# Patient Record
Sex: FEMALE | Race: WHITE | NOT HISPANIC OR LATINO | Employment: FULL TIME | URBAN - METROPOLITAN AREA
[De-identification: names, ages, dates, MRNs, and addresses within clinical notes are randomized per-mention and may not be internally consistent; named-entity substitution may affect disease eponyms.]

---

## 2017-01-03 ENCOUNTER — TELEPHONE (OUTPATIENT)
Dept: CARDIOLOGY | Facility: CLINIC | Age: 52
End: 2017-01-03

## 2017-01-03 NOTE — TELEPHONE ENCOUNTER
Pt called she needs clearance for an upcoming Total Knee Arthroscopy.   Dr Dexter Araya at [facility] # [phone].   Please advise   Pt's call back # [phone]   Thanks Elmo GILL

## 2017-01-03 NOTE — TELEPHONE ENCOUNTER
I spoke with pt and let her know she is cleared for her surgery.   I will fax a letter to Dr Araya's office. I will send copy of clearance letter and fax confirmation to be scanned into pt's chart.  Elmo GILL

## 2017-01-03 NOTE — TELEPHONE ENCOUNTER
Elmo,     Can you please send a clearance letter?  She is clear at low-to-moderate risk, and I would proceed.  Thanks.    GM

## 2017-01-19 RX ORDER — AMOXICILLIN 500 MG/1
2000 CAPSULE ORAL SEE ADMIN INSTRUCTIONS
Qty: 4 CAPSULE | Refills: 6 | Status: SHIPPED | OUTPATIENT
Start: 2017-01-19 | End: 2017-05-05

## 2017-04-14 ENCOUNTER — LAB (OUTPATIENT)
Dept: LAB | Facility: HOSPITAL | Age: 52
End: 2017-04-14

## 2017-04-14 ENCOUNTER — OFFICE VISIT (OUTPATIENT)
Dept: CARDIOLOGY | Facility: CLINIC | Age: 52
End: 2017-04-14

## 2017-04-14 ENCOUNTER — CLINICAL SUPPORT NO REQUIREMENTS (OUTPATIENT)
Dept: CARDIOLOGY | Facility: CLINIC | Age: 52
End: 2017-04-14

## 2017-04-14 VITALS
WEIGHT: 198 LBS | HEART RATE: 86 BPM | SYSTOLIC BLOOD PRESSURE: 110 MMHG | HEIGHT: 66 IN | DIASTOLIC BLOOD PRESSURE: 78 MMHG | OXYGEN SATURATION: 97 % | BODY MASS INDEX: 31.82 KG/M2

## 2017-04-14 DIAGNOSIS — Z95.0 PACEMAKER: ICD-10-CM

## 2017-04-14 DIAGNOSIS — I50.9 CONGESTIVE HEART FAILURE, UNSPECIFIED CONGESTIVE HEART FAILURE CHRONICITY, UNSPECIFIED CONGESTIVE HEART FAILURE TYPE: Primary | ICD-10-CM

## 2017-04-14 DIAGNOSIS — R30.0 DYSURIA: ICD-10-CM

## 2017-04-14 DIAGNOSIS — I42.8 NONISCHEMIC CARDIOMYOPATHY (HCC): Primary | ICD-10-CM

## 2017-04-14 DIAGNOSIS — Z87.74 STATUS POST PATCH CLOSURE OF ASD: ICD-10-CM

## 2017-04-14 LAB
BACTERIA UR QL AUTO: ABNORMAL /HPF
BILIRUB UR QL STRIP: NEGATIVE
CLARITY UR: ABNORMAL
COLOR UR: YELLOW
GLUCOSE UR STRIP-MCNC: NEGATIVE MG/DL
HGB UR QL STRIP.AUTO: NEGATIVE
HYALINE CASTS UR QL AUTO: ABNORMAL /LPF
KETONES UR QL STRIP: NEGATIVE
LEUKOCYTE ESTERASE UR QL STRIP.AUTO: ABNORMAL
NITRITE UR QL STRIP: NEGATIVE
PH UR STRIP.AUTO: 7 [PH] (ref 5–8)
PROT UR QL STRIP: NEGATIVE
RBC # UR: ABNORMAL /HPF
REF LAB TEST METHOD: ABNORMAL
SP GR UR STRIP: 1.02 (ref 1–1.03)
SQUAMOUS #/AREA URNS HPF: ABNORMAL /HPF
UROBILINOGEN UR QL STRIP: ABNORMAL
WBC UR QL AUTO: ABNORMAL /HPF

## 2017-04-14 PROCEDURE — 93281 PM DEVICE PROGR EVAL MULTI: CPT | Performed by: INTERNAL MEDICINE

## 2017-04-14 PROCEDURE — 93290 INTERROG DEV EVAL ICPMS IP: CPT | Performed by: INTERNAL MEDICINE

## 2017-04-14 PROCEDURE — 87186 SC STD MICRODIL/AGAR DIL: CPT

## 2017-04-14 PROCEDURE — 87181 SC STD AGAR DILUTION PER AGT: CPT

## 2017-04-14 PROCEDURE — 87086 URINE CULTURE/COLONY COUNT: CPT

## 2017-04-14 PROCEDURE — 99213 OFFICE O/P EST LOW 20 MIN: CPT | Performed by: INTERNAL MEDICINE

## 2017-04-14 PROCEDURE — 81001 URINALYSIS AUTO W/SCOPE: CPT

## 2017-04-14 NOTE — PROGRESS NOTES
Date of Office Visit: 2017  Encounter Provider: Lion Marques MD  Place of Service: [facility] CARDIOLOGY  Patient Name: Whitney Schofield  :1965    Chief complaint: Follow-up for open ASD closure, pacemaker, and non-ischemic   cardiomyopathy.    History of Present Illness:    Dear Dr. Meier:       I again had the pleasure of seeing your patient in cardiology office on 2017. As  you well know, she is a very pleasant 51 year old white female with a past medical history  significant for multiple cardiovascular issues who presents for follow-up. The patient had  originally presented to [facility] with symptoms of palpitations and  shortness of breath in 2009. She subsequently was diagnosed with Hashimoto  thyroiditis, and underwent a partial thyroidectomy in 2010. As part of a routine  follow-up, she had a two-dimensional echocardiogram performed on 2010 which   revealed a severely dilated right ventricle and significant pulmonary hypertension. A CT   scan of her chest did not show any evidence for pulmonary embolus or other acute lung   pathology.  She underwent a transesophageal echocardiogram on 2010 which   revealed a large secundum ASD high in the atrial septum. 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 She was referred to   Dr. Harper for evaluation to surgically repair the ASD, and underwent a preoperative   cardiac catheterization on 2010. This revealed angiographically normal coronary   arteries, with a Qp:Qs of 2.3, consistent with a large shunt. The patient underwent   closure of the ASD, as well as tricuspid valve repair with an annuloplasty ring by Dr. Harper at [facility] on 2010. Her postoperative period was complicated by   complete AV block and accelerated junctional escape rhythm. She underwent   placement of a permanent pacemaker on  "05/11/2010.      The patient continued to have significant shortness of breath and a 24-hour Holter   monitor and two-dimensional echocardiogram were obtained on 05/12/2011. Her   Holter monitor did show several brief episodes of tachycardia. The echocardiogram   did not show any significant findings other than a \"shuttering motion\" of the septum.   She underwent a right and left heart catheterization on 07/20/2011 to assess for any   evidence of constriction. There was no evidence of restriction or constriction on the   heart catheterization, although she did have pulmonary hypertension with a mean   pulmonary artery pressure of 30 mmHg. A transesophageal echocardiogram was also   performed on 06/20/2011 which revealed an ejection fraction of 55-60%, and an intact   ASD repair. An electrophysiology study was performed by Dr. Foster on 08/01/2011.   This was performed through her pacemaker in conjunction with an isoproterenol   infusion. This showed no inducible tachycardia or ventricular tachycardia.       The patient eventually wore a 21-day event recorder and was noted to go into   wide-complex tachycardia on 09/30/2012. She had multiple other episodes as well.   Dr. Aguilar from Electrophysiology saw the patient and felt this represented ventricular   tachycardia. She did ultimately undergo a repeat electrophysiology study on   11/20/2012 by Dr. Aguilar which confirmed complete infra-hisian block with no   conduction retrograde or antegrade. No ventricular tachycardia was induced. She   was started on beta blockers at that time.       At the patient's visit in 03/2013 she complained of severe volume retention and edema.    She was also more short of breath and had gained a significant amount of weight.   She was admitted to the hospital on 03/25/2013 and underwent a transesophageal   echocardiogram.  Her ejection fraction had decreased significantly to 35%, and there   was an extensive amount of thrombus formation on " her pacemaker wires. Her ASD   repair appeared to be in good condition. She did undergo a CT scan of the chest   which showed no evidence for pulmonary embolism, as well as a CT scan of the   abdomen which showed no evidence for inferior vena cava obstruction or clotting.   She was diuresed extensively and placed on Coumadin at that time. 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 She ultimately underwent left   ventricular lead placement and pacemaker replacement on 08/19/2013 by Dr. Aguilar.   She then had right atrial lead dislodgement and diaphragmatic stimulation. A right   atrial lead revision was performed on 02/19/2014; however, the patient developed a   significant left subclavian vein thrombosis post procedurally. She again was placed   on Coumadin at that time, which was then switched to Xarelto. She ultimately   underwent laser lead extraction of the abandoned right atrial lead on 04/17/2014. She   did undergo a right heart cath on 04/13/2015 which showed only mild pulmonary   hypertension (mean PA pressure 29 mm Hg).      The patient presents today for follow-up.  She did have her left knee arthroplasty on   1/23/2017.  She has largely recovered from this, and actually has some better   ambulation now.  Her edema has been intermittent, and this is normal for her.  She   has not had any increasing edema.  She was diagnosed with celiac disease recently,   unfortunately.  Her shortness of breath is at baseline.  She did have her pacemaker   interrogated, and this is functioning normally.    Past Medical History:   Diagnosis Date   • Asthma    • Asystole     Post ASD closure asystole and high-grade AV block.  s/p pacemaker 5/11/10.   • Celiac disease    • DVT (deep venous thrombosis)     Left subclavian DVT following right atrial pacemaker lead revision in 2/14   • Edema    • Hashimoto's thyroiditis     s/p partial thyroidectomy 1/13/10   • Malignant neoplasm of thyroid gland      Papillary carcinoma of the thyroid - s/p residual thyroidectomy in 2/11.   • Migraine    • Nonischemic cardiomyopathy     EF as low as 35% 3/13.  EF 6/21/13 by CHANDANA was 50-55%, and 53% by echo on 6/17/14.  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  EF 50% by CHANDANA on 4/18/16.   • NSVT (nonsustained ventricular tachycardia)     Noted by event recorder 9/12.  EP study by Dr. Aguilar on 11/20/12 showed no inducible VT.   • LUKAS on CPAP    • Rheumatoid arthritis    • Secundum ASD     s/p open closure with a PeriPatch xenograft by Dr. Harper on 5/6/10 by Dr. Harper at [facility].   • SOB (shortness of breath)     Multifactorial    • Thrombus due to any device, implant or graft     Extensive thrombus formation on the pacemaker leads by CHANDANA 3/25/13.  Initiated on Coumadin transiently at that time - improved significantly by CHANDANA 6/21/13.       Past Surgical History:   Procedure Laterality Date   • CHOLECYSTECTOMY  2007   • PACEMAKER IMPLANTATION      Inital dual-chamber pacemaker placed 5/11/10 after ASD closure surgery.  Had LV lead placement and generator change on 8/19/13 by Dr. Aguilar (Medtronic #[id number]).  Then had right atrial lead revision on 2/19/14 (for right atrial lead dislodgement and diaphragmatic stimulation) - developed left subclavian DVT afterwards.  Then had laser lead extraction of the abandoned right atrial lead on 4/17/14.   • REPLACEMENT TOTAL KNEE BILATERAL     • TRICUSPID VALVE SURGERY      #32 MC3 annuloplasty ring by Dr. Harper on 5/6/10 (at time of ASD closure)       Current Outpatient Prescriptions on File Prior to Visit   Medication Sig Dispense Refill   • amoxicillin (AMOXIL) 500 MG capsule Take 4 capsules by mouth See Admin Instructions. 4 capsules 1 hour prior to procedure 4 capsule 6   • bumetanide (BUMEX) 2 MG tablet Take 1 tablet by mouth daily. 90 tablet 3   • carvedilol (COREG) 3.125 MG tablet Take 1 tablet by mouth 2 (two) times a day. 180 tablet 3   • Ergocalciferol (VITAMIN D2)  2000 UNITS tablet Take  by mouth.     • esomeprazole (NexIUM) 40 MG capsule Take 40 mg by mouth. Prn       • Ferrous Sulfate Dried 200 (65 FE) MG tablet Take 200 mg by mouth.     • folic acid (FOLVITE) 1 MG tablet Take 1 mg by mouth daily.     • HYDROcodone-acetaminophen (NORCO)  MG per tablet Take  tablets by mouth.     • leflunomide (ARAVA) 20 MG tablet Take 20 mg by mouth daily.     • levothyroxine (SYNTHROID, LEVOTHROID) 300 MCG tablet Take 1 tablet by mouth daily.  0   • losartan (COZAAR) 25 MG tablet Take 1 tablet by mouth daily. 90 tablet 3   • methotrexate 2.5 MG tablet Take 20 mg by mouth 1 (one) time per week.     • potassium chloride (MICRO-K) 10 MEQ CR capsule Take 10 mEq by mouth 2 (two) times a day.       No current facility-administered medications on file prior to visit.      Allergies as of 04/14/2017 - Zaki as Reviewed 04/14/2017   Allergen Reaction Noted   • Azithromycin  02/25/2016     Social History     Social History   • Marital status:      Spouse name: N/A   • Number of children: N/A   • Years of education: N/A     Occupational History   • Not on file.     Social History Main Topics   • Smoking status: Never Smoker   • Smokeless tobacco: Never Used   • Alcohol use No   • Drug use: No   • Sexual activity: Not on file     Other Topics Concern   • Not on file     Social History Narrative     Family History   Problem Relation Age of Onset   • COPD Mother    • Heart failure Mother      CHF       Review of Systems   Constitution: Positive for malaise/fatigue.   Cardiovascular: Positive for dyspnea on exertion.   Respiratory: Positive for shortness of breath.    Skin: Positive for itching and rash.   Musculoskeletal: Positive for joint pain and joint swelling.   Genitourinary: Positive for dysuria and frequency.   Neurological: Positive for paresthesias.   All other systems reviewed and are negative.    Objective:     Vitals:    04/14/17 1125   BP: 110/78   Pulse: 86   SpO2: 97%  "  Weight: 198 lb (89.8 kg)   Height: 66\" (167.6 cm)     Body mass index is 31.96 kg/(m^2).    Physical Exam   Constitutional: She is oriented to person, place, and time. She appears well-developed and well-nourished.   HENT:   Head: Normocephalic and atraumatic.   Eyes: Conjunctivae are normal.   Neck: Neck supple.   Cardiovascular: Normal rate and regular rhythm.  Exam reveals no friction rub.    Murmur heard.   Systolic murmur is present with a grade of 2/6  at the upper left sternal border, lower left sternal border  Pulmonary/Chest: Effort normal. She has no rales.   Abdominal: Soft. There is no tenderness.   Musculoskeletal: She exhibits edema.   Neurological: She is alert and oriented to person, place, and time.   Skin: Skin is warm.   Psychiatric: She has a normal mood and affect. Her behavior is normal.     Lab Review:   Procedures    Cardiac Procedures:  1. Status post closure of a large secundum ASD with a PeriPatch xenograft with concomitant  tricuspid valve repair with a #32 MC3 annuloplasty ring by Dr. Jaime Harper on  05/06/2010 at [facility].    2. Left heart catheterization on 03/26/2010 showed angiographically normal coronary  arteries.    3. Transesophageal echocardiogram on 06/21/2013 revealed an ejection fraction of 50-55%.    4. Status post placement of a dual chamber permanent pacemaker by Dr. Bush on 05/11/2010  following postoperative asystole and complete AV block after her ASD surgery.    5. Status post left ventricular lead placement and pacemaker replacement on 08/19/2013 by  Dr. Aguilar. At that time she had a left ventricular lead added, to make this a  biventricular pacemaker. The generator was also changed and is a Medtronic #[id number].    6. Status post right atrial lead revision on 02/19/2014. This was secondary to right  atrial lead dislodgement and diaphragmatic stimulation. She did develop a left subclavian  vein thrombosis following the procedure.    7. Status post laser lead " extraction of the abandoned right atrial lead on 04/17/2014 by  Dr. Christiano Aguilar at [facility].    8. CHANDANA on 04/18/2016: Ejection fraction was 50%, the left ventricle was mildly dilated,   there was grade 1 diastolic dysfunction, the ASD closure site was intact with no evidence   for shunting, there was no evidence of vegetation or thrombus formation on the pacemaker   leads, there was mild mitral regurgitation, and there was trace to mild tricuspid regurgitation   through the annuloplasty ring.        Assessment:       Diagnosis Plan   1. Nonischemic cardiomyopathy     2. Dysuria  Urinalysis With / Culture If Indicated   3. Status post patch closure of ASD     4. Pacemaker       Plan:       Overall, she seems to be stable from a cardiac standpoint.  Her pacemaker was checked,   and is functioning normally.  Her last echocardiogram was a CHANDANA on 4/18/2016.  At that   point, her ejection fraction actually was 50%, which was better than it had been in the past.    I do not feel that she needs another echo at this point.  She still does have occasional lower   extremity edema, and takes Bumex 2 mg on an as-needed basis.  She is taking the Coreg   and losartan without any difficulty.  She still has 2 years in 6 months on her pacemaker   battery at this point.  For now, I will plan on seeing her back in the office within the next 4   months unless other issues arise.    Heart Failure  Assessment  • NYHA class II - There is slight limitation of physical activity. The patient is comfortable at rest, but physical activity results in fatigue, palpitations or shortness of breath.  • Beta blocker prescribed  • Diuretics prescribed  • Angiotensin receptor blocker (ARB) prescribed  • The most recent ejection fraction is 50%  • Left ventricular function is normal by qualitative assessment  • The left ventricle was last assessed on 4/18/2016    Plan  • The patient has received heart failure education on the following topics:  dietary sodium restriction, medication instructions, minimizing or avoiding NSAID use, physical activity and minimizing alcohol intake  • The heart failure care plan was discussed with the patient today including: continuing the current program  •  The patient was not counseled about ICD or CRT-D implantation due to medical reasons    Subjective/Objective  • The patient reports dyspnea  • Physical exam findings positive for peripheral edema.   • Physical exam findings negative for rales.

## 2017-04-17 DIAGNOSIS — N39.0 URINARY TRACT INFECTION WITHOUT HEMATURIA, SITE UNSPECIFIED: Primary | ICD-10-CM

## 2017-04-19 LAB — BACTERIA SPEC AEROBE CULT: ABNORMAL

## 2017-05-05 ENCOUNTER — OFFICE VISIT (OUTPATIENT)
Dept: INFECTIOUS DISEASES | Facility: CLINIC | Age: 52
End: 2017-05-05

## 2017-05-05 VITALS
BODY MASS INDEX: 33.05 KG/M2 | HEART RATE: 83 BPM | WEIGHT: 198.4 LBS | SYSTOLIC BLOOD PRESSURE: 110 MMHG | DIASTOLIC BLOOD PRESSURE: 77 MMHG | TEMPERATURE: 98.2 F | HEIGHT: 65 IN

## 2017-05-05 DIAGNOSIS — N30.90 CYSTITIS: ICD-10-CM

## 2017-05-05 DIAGNOSIS — A49.8 INFECTION DUE TO ESBL-PRODUCING ESCHERICHIA COLI: Primary | ICD-10-CM

## 2017-05-05 DIAGNOSIS — Z16.12 INFECTION DUE TO ESBL-PRODUCING ESCHERICHIA COLI: Primary | ICD-10-CM

## 2017-05-05 PROCEDURE — 99243 OFF/OP CNSLTJ NEW/EST LOW 30: CPT | Performed by: INTERNAL MEDICINE

## 2017-05-05 RX ORDER — SENNA AND DOCUSATE SODIUM 50; 8.6 MG/1; MG/1
1 TABLET, FILM COATED ORAL DAILY
COMMUNITY
End: 2017-09-06

## 2017-05-05 RX ORDER — IBUPROFEN 400 MG/1
400 TABLET ORAL EVERY 4 HOURS
COMMUNITY
End: 2018-07-18

## 2017-05-05 RX ORDER — GRANULES FOR ORAL 3 G/1
3 POWDER ORAL
Qty: 9 G | Refills: 0 | Status: SHIPPED | OUTPATIENT
Start: 2017-05-05 | End: 2017-05-12

## 2017-07-13 RX ORDER — CARVEDILOL 3.12 MG/1
TABLET ORAL
Qty: 180 TABLET | Refills: 2 | Status: SHIPPED | OUTPATIENT
Start: 2017-07-13 | End: 2018-07-18

## 2017-07-13 RX ORDER — LOSARTAN POTASSIUM 25 MG/1
TABLET ORAL
Qty: 90 TABLET | Refills: 2 | Status: SHIPPED | OUTPATIENT
Start: 2017-07-13 | End: 2018-07-18

## 2017-09-06 ENCOUNTER — OFFICE VISIT (OUTPATIENT)
Dept: CARDIOLOGY | Facility: CLINIC | Age: 52
End: 2017-09-06

## 2017-09-06 ENCOUNTER — CLINICAL SUPPORT NO REQUIREMENTS (OUTPATIENT)
Dept: CARDIOLOGY | Facility: CLINIC | Age: 52
End: 2017-09-06

## 2017-09-06 VITALS
WEIGHT: 203 LBS | BODY MASS INDEX: 33.82 KG/M2 | HEART RATE: 78 BPM | DIASTOLIC BLOOD PRESSURE: 86 MMHG | HEIGHT: 65 IN | SYSTOLIC BLOOD PRESSURE: 130 MMHG

## 2017-09-06 DIAGNOSIS — I50.9 CONGESTIVE HEART FAILURE, UNSPECIFIED: Primary | ICD-10-CM

## 2017-09-06 DIAGNOSIS — Z87.74 STATUS POST PATCH CLOSURE OF ASD: ICD-10-CM

## 2017-09-06 DIAGNOSIS — R06.02 SHORTNESS OF BREATH: Primary | ICD-10-CM

## 2017-09-06 DIAGNOSIS — I50.42 CHRONIC COMBINED SYSTOLIC AND DIASTOLIC CONGESTIVE HEART FAILURE (HCC): ICD-10-CM

## 2017-09-06 DIAGNOSIS — Z95.0 PACEMAKER: ICD-10-CM

## 2017-09-06 DIAGNOSIS — I42.8 NON-ISCHEMIC CARDIOMYOPATHY (HCC): ICD-10-CM

## 2017-09-06 PROCEDURE — 93281 PM DEVICE PROGR EVAL MULTI: CPT | Performed by: INTERNAL MEDICINE

## 2017-09-06 PROCEDURE — 99214 OFFICE O/P EST MOD 30 MIN: CPT | Performed by: INTERNAL MEDICINE

## 2017-09-12 NOTE — PROGRESS NOTES
Date of Office Visit: 2017  Encounter Provider: Lion Marques MD  Place of Service: [facility]ARD[facility]  Patient Name: Whitney Schofield  :1965    Chief complaint: Follow-up for open ASD closure, pacemaker, and non-ischemic   cardiomyopathy.    History of Present Illness:    Dear Dr. Meier:       I again had the pleasure of seeing your patient in cardiology office on 2017. As  you well know, she is a very pleasant 51 year old white female with a past history  significant for multiple cardiovascular issues who presents for follow-up. The patient   originally presented to  with symptoms of palpitations and  shortness of breath in 2009. She subsequently was diagnosed with Hashimoto  thyroiditis, and underwent a partial thyroidectomy in 2010. As part of a routine  follow-up, she had a two-dimensional echocardiogram performed on 2010 which   revealed a severely dilated right ventricle and significant pulmonary hypertension. A CT   scan of her chest did not show any evidence for pulmonary embolus or other acute lung   pathology.  She underwent a transesophageal echocardiogram on 2010 which   revealed a large secundum ASD high in the atrial septum. 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 She was referred to   Dr. Harper for evaluation to surgically repair the ASD, and underwent a preoperative   cardiac catheterization on 2010. This revealed angiographically normal coronary   arteries, with a Qp:Qs of 2.3, consistent with a large shunt. The patient underwent   closure of the ASD, as well as tricuspid valve repair with an annuloplasty ring by Dr. Harper at [facility] on 2010. Her postoperative period was complicated by   complete AV block and accelerated junctional escape rhythm. She underwent   placement of a permanent pacemaker on 2010.      The  "patient continued to have significant shortness of breath and a 24-hour Holter   monitor and two-dimensional echocardiogram were obtained on 05/12/2011. Her   Holter monitor did show several brief episodes of tachycardia. The echocardiogram   did not show any significant findings other than a \"shuttering motion\" of the septum.   She underwent a right and left heart catheterization on 07/20/2011 to assess for any   evidence of constriction. There was no evidence of restriction or constriction on the   heart catheterization, although she did have pulmonary hypertension with a mean   pulmonary artery pressure of 30 mmHg. A transesophageal echocardiogram was also   performed on 06/20/2011 which revealed an ejection fraction of 55-60%, and an intact   ASD repair. An electrophysiology study was performed by Dr. Foster on 08/01/2011.   This was performed through her pacemaker in conjunction with an isoproterenol   infusion. This showed no inducible tachycardia or ventricular tachycardia.       The patient eventually wore a 21-day event recorder and was noted to go into   wide-complex tachycardia on 09/30/2012. She had multiple other episodes as well.   Dr. Aguilar from Electrophysiology saw the patient and felt this represented ventricular   tachycardia. She did ultimately undergo a repeat electrophysiology study on   11/20/2012 by Dr. Aguilar which confirmed complete infra-hisian block with no   conduction retrograde or antegrade. No ventricular tachycardia was induced. She   was started on beta blockers at that time.       At the patient's visit in 03/2013 she complained of severe volume retention and edema.    She was also more short of breath and had gained a significant amount of weight.   She was admitted to the hospital on 03/25/2013 and underwent a transesophageal   echocardiogram.  Her ejection fraction had decreased significantly to 35%, and there   was an extensive amount of thrombus formation on her pacemaker wires. " Her ASD   repair appeared to be in good condition. She did undergo a CT scan of the chest   which showed no evidence for pulmonary embolism, as well as a CT scan of the   abdomen which showed no evidence for inferior vena cava obstruction or clotting.   She was diuresed extensively and placed on Coumadin at that time. 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 She ultimately underwent left   ventricular lead placement and pacemaker replacement on 08/19/2013 by Dr. Aguilar.   She then had right atrial lead dislodgement and diaphragmatic stimulation. A right   atrial lead revision was performed on 02/19/2014; however, the patient developed a   significant left subclavian vein thrombosis post procedurally. She again was placed   on Coumadin at that time, which was then switched to Xarelto. She ultimately   underwent laser lead extraction of the abandoned right atrial lead on 04/17/2014. She   did undergo a right heart cath on 04/13/2015 which showed only mild pulmonary   hypertension (mean PA pressure 29 mm Hg).      The patient presents today for follow-up.  Unfortunately, she has been having multiple   issues with rheumatoid arthritis again.  Overall, she just does not feel well and is   significantly fatigued.  Her shortness of breath has been worse on exertion recently,   although she has not needed to take the Bumex recently.  She does have pulmonary   function tests ordered by her rheumatologist.  She has not had any significant chest  pain.    Past Medical History:   Diagnosis Date   • Anemia    • Asthma    • Asystole     Post ASD closure asystole and high-grade AV block.  s/p pacemaker 5/11/10.   • Celiac disease    • DVT (deep venous thrombosis)     Left subclavian DVT following right atrial pacemaker lead revision in 2/14   • Edema    • Hashimoto's thyroiditis     s/p partial thyroidectomy 1/13/10   • Heart palpitations    • Hx of thyroid cancer    • Malignant neoplasm of thyroid gland      Papillary carcinoma of the thyroid - s/p residual thyroidectomy in 2/11.   • Migraine    • Nonischemic cardiomyopathy     EF as low as 35% 3/13.  EF 6/21/13 by CHANDANA was 50-55%, and 53% by echo on 6/17/14.  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  EF 50% by CHANDANA on 4/18/16.   • NSVT (nonsustained ventricular tachycardia)     Noted by event recorder 9/12.  EP study by Dr. Aguilar on 11/20/12 showed no inducible VT.   • LUKAS on CPAP    • Papillary thyroid carcinoma 08/27/2013   • Rheumatoid arthritis    • Secundum ASD     s/p open closure with a PeriPatch xenograft by Dr. Haprer on 5/6/10 by Dr. Harper at [facility].   • SOB (shortness of breath)     Multifactorial    • Thrombus due to any device, implant or graft     Extensive thrombus formation on the pacemaker leads by CHANDANA 3/25/13.  Initiated on Coumadin transiently at that time - improved significantly by CHANDANA 6/21/13.       Past Surgical History:   Procedure Laterality Date   • CHOLECYSTECTOMY  2007   • ENDOSCOPY AND COLONOSCOPY  12/30/2015    Tom Orta MD   • PACEMAKER IMPLANTATION  05/2010    Inital dual-chamber pacemaker placed 5/11/10 after ASD closure surgery.  Had LV lead placement and generator change on 8/19/13 by Dr. Aguilar (Medtronic #[id number]).  Then had right atrial lead revision on 2/19/14 (for right atrial lead dislodgement and diaphragmatic stimulation) - developed left subclavian DVT afterwards.  Then had laser lead extraction of the abandoned right atrial lead on 4/17/14.   • REPLACEMENT TOTAL KNEE BILATERAL     • THYROID LOBECTOMY Right 2012   • THYROIDECTOMY Left 2011    Dr. Coronado, radioactive iodine X3   • TONSILLECTOMY     • TRICUSPID VALVE SURGERY      #32 MC3 annuloplasty ring by Dr. Harper on 5/6/10 (at time of ASD closure)       Current Outpatient Prescriptions on File Prior to Visit   Medication Sig Dispense Refill   • bumetanide (BUMEX) 2 MG tablet Take 1 tablet by mouth daily. (Patient taking differently: Take 2 mg  by mouth As Needed.) 90 tablet 3   • carvedilol (COREG) 3.125 MG tablet TAKE 1 TABLET TWICE A  tablet 2   • Ergocalciferol (VITAMIN D2) 2000 UNITS tablet Take  by mouth. Pt take 5000 units     • esomeprazole (NexIUM) 40 MG capsule Take 40 mg by mouth. Prn       • Ferrous Sulfate Dried 200 (65 FE) MG tablet Take 200 mg by mouth.     • folic acid (FOLVITE) 1 MG tablet Take 1 mg by mouth daily.     • HYDROcodone-acetaminophen (NORCO)  MG per tablet Take  tablets by mouth.     • ibuprofen (ADVIL,MOTRIN) 400 MG tablet Take 400 mg by mouth Every 4 (Four) Hours.     • leflunomide (ARAVA) 20 MG tablet Take 20 mg by mouth daily.     • levothyroxine (SYNTHROID, LEVOTHROID) 300 MCG tablet Take 1 tablet by mouth daily.  0   • losartan (COZAAR) 25 MG tablet TAKE 1 TABLET DAILY 90 tablet 2   • methotrexate 2.5 MG tablet Take 20 mg by mouth 1 (one) time per week.     • potassium chloride (MICRO-K) 10 MEQ CR capsule Take 10 mEq by mouth As Needed.       No current facility-administered medications on file prior to visit.      Allergies as of 09/06/2017 - Zaki as Reviewed 09/06/2017   Allergen Reaction Noted   • Ferumoxytol  02/10/2017   • Azithromycin Rash 06/02/2012     Social History     Social History   • Marital status:      Spouse name: N/A   • Number of children: 2   • Years of education: N/A     Occupational History   •       Social History Main Topics   • Smoking status: Never Smoker   • Smokeless tobacco: Never Used   • Alcohol use No   • Drug use: No   • Sexual activity: Yes     Partners: Male     Other Topics Concern   • Not on file     Social History Narrative     Family History   Problem Relation Age of Onset   • COPD Mother    • Heart failure Mother      CHF   • Lung disease Mother    • Hypertension Mother    • Diabetes Mother    • Coronary artery disease Mother    • Diabetes Sister    • Breast cancer Maternal Aunt        Review of Systems   Constitution: Positive for  "malaise/fatigue.   HENT: Positive for headaches.    Cardiovascular: Positive for leg swelling.   Respiratory: Positive for cough and shortness of breath.    Musculoskeletal: Positive for joint pain and joint swelling.   Genitourinary: Positive for frequency.   All other systems reviewed and are negative.    Objective:     Vitals:    09/06/17 1259   BP: 130/86   BP Location: Left arm   Patient Position: Sitting   Pulse: 78   Weight: 203 lb (92.1 kg)   Height: 65\" (165.1 cm)     Body mass index is 33.78 kg/(m^2).    Physical Exam   Constitutional: She is oriented to person, place, and time. She appears well-developed and well-nourished.   HENT:   Head: Normocephalic and atraumatic.   Eyes: Conjunctivae are normal.   Neck: Neck supple.   Cardiovascular: Normal rate and regular rhythm.  Exam reveals no gallop and no friction rub.    Murmur heard.   Systolic murmur is present with a grade of 2/6  at the upper left sternal border, lower left sternal border  Pulmonary/Chest: Effort normal and breath sounds normal.   Abdominal: Soft. There is no tenderness.   Musculoskeletal: She exhibits no edema.   Neurological: She is alert and oriented to person, place, and time.   Skin: Skin is warm.   Psychiatric: She has a normal mood and affect. Her behavior is normal.     Lab Review:   Procedures    Cardiac Procedures:  1. Status post closure of a large secundum ASD with a PeriPatch xenograft with concomitant  tricuspid valve repair with a #32 MC3 annuloplasty ring by Dr. Jaime Harper on 05/06/2010   at [facility].    2. Left heart catheterization on 03/26/2010 showed angiographically normal coronary  arteries.    3. Transesophageal echocardiogram on 06/21/2013 revealed an ejection fraction of 50-55%.    4. Status post placement of a dual chamber permanent pacemaker by Dr. Bush on   05/11/2010 following postoperative asystole and complete AV block after her ASD surgery.    5. Status post left ventricular lead placement and " pacemaker replacement on 08/19/2013 by  Dr. Aguilar. At that time she had a left ventricular lead added, to make this a  biventricular pacemaker. The generator was also changed and is a Medtronic #[id number].    6. Status post right atrial lead revision on 02/19/2014. This was secondary to right atrial   lead dislodgement and diaphragmatic stimulation. She did develop a left subclavian vein   thrombosis following the procedure.    7. Status post laser lead extraction of the abandoned right atrial lead on 04/17/2014 by Dr. Christiano Aguilar at [facility].    8. CHANDANA on 04/18/2016: Ejection fraction was 50%, the left ventricle was mildly dilated, there   was grade 1 diastolic dysfunction, the ASD closure site was intact with no evidence for   shunting, there was no evidence of vegetation or thrombus formation on the pacemaker   leads, there was mild mitral regurgitation, and there was trace to mild tricuspid regurgitation   through the annuloplasty ring.    Assessment:       Diagnosis Plan   1. Shortness of breath  Adult Transthoracic Echo Complete   2. Status post patch closure of ASD  Adult Transthoracic Echo Complete   3. Pacemaker     4. Non-ischemic cardiomyopathy  Adult Transthoracic Echo Complete   5. Chronic combined systolic and diastolic congestive heart failure  Adult Transthoracic Echo Complete     Plan:       Again, the patient has had issues with her rheumatoid arthritis, and generally feeling poorly.    She has had significant joint pain.  She also has been more fatigued, and has felt more   short of breath with exertion.  However, she does not have any significant edema on exam   today, and she has not needed to take the Bumex recently.  Her blood pressure is   controlled on the losartan and Coreg.  She does have pulmonary function test pending  from her rheumatologist.  I am going to check an echocardiogram at this time.  She has   not had an echo since a CHANDANA was performed on 4/18/2016.  At that time, her  ejection   fraction was 50%.  Her pacemaker was interrogated today, and is functioning normally.    She has 2 years and 8 months left on her battery life.  No changes were made.  I will plan   on seeing her back in the office within the next 4 months unless other issues arise.    Heart Failure  Assessment  • NYHA class II - There is slight limitation of physical activity. The patient is comfortable at rest, but physical activity results in fatigue, palpitations or shortness of breath.  • Beta blocker prescribed  • Diuretics prescribed  • Angiotensin receptor blocker (ARB) prescribed  • The most recent ejection fraction is 50%  • Left ventricular function is normal by qualitative assessment  • The left ventricle was last assessed on 4/18/2016   Plan  • The patient has received heart failure education on the following topics: dietary sodium restriction, medication instructions, minimizing or avoiding NSAID use, physical activity and minimizing alcohol intake  • The heart failure care plan was discussed with the patient today including: continuing the current program  •  The patient was not counseled about ICD or CRT-D implantation due to medical reasons     Subjective/Objective  • The patient reports dyspnea  • Physical exam findings positive for peripheral edema.   • Physical exam findings negative for rales.

## 2017-09-21 ENCOUNTER — HOSPITAL ENCOUNTER (OUTPATIENT)
Dept: CARDIOLOGY | Facility: HOSPITAL | Age: 52
Discharge: HOME | End: 2017-09-21
Attending: INTERNAL MEDICINE | Admitting: INTERNAL MEDICINE

## 2017-09-21 VITALS
SYSTOLIC BLOOD PRESSURE: 126 MMHG | HEART RATE: 78 BPM | BODY MASS INDEX: 33.82 KG/M2 | WEIGHT: 203 LBS | DIASTOLIC BLOOD PRESSURE: 74 MMHG | HEIGHT: 65 IN

## 2017-09-21 DIAGNOSIS — I50.42 CHRONIC COMBINED SYSTOLIC AND DIASTOLIC CONGESTIVE HEART FAILURE (HCC): ICD-10-CM

## 2017-09-21 DIAGNOSIS — Z87.74 STATUS POST PATCH CLOSURE OF ASD: ICD-10-CM

## 2017-09-21 DIAGNOSIS — R06.02 SHORTNESS OF BREATH: ICD-10-CM

## 2017-09-21 DIAGNOSIS — I42.8 NON-ISCHEMIC CARDIOMYOPATHY (HCC): ICD-10-CM

## 2017-09-21 LAB
BH CV ECHO MEAS - ACS: 2.4 CM
BH CV ECHO MEAS - AO MAX PG (FULL): 4.5 MMHG
BH CV ECHO MEAS - AO MAX PG: 6.7 MMHG
BH CV ECHO MEAS - AO MEAN PG (FULL): 2.6 MMHG
BH CV ECHO MEAS - AO MEAN PG: 3.7 MMHG
BH CV ECHO MEAS - AO ROOT AREA (BSA CORRECTED): 1.7
BH CV ECHO MEAS - AO ROOT AREA: 9 CM^2
BH CV ECHO MEAS - AO ROOT DIAM: 3.4 CM
BH CV ECHO MEAS - AO V2 MAX: 129.4 CM/SEC
BH CV ECHO MEAS - AO V2 MEAN: 86.5 CM/SEC
BH CV ECHO MEAS - AO V2 VTI: 26.5 CM
BH CV ECHO MEAS - AVA(I,A): 1.9 CM^2
BH CV ECHO MEAS - AVA(I,D): 1.9 CM^2
BH CV ECHO MEAS - AVA(V,A): 2 CM^2
BH CV ECHO MEAS - AVA(V,D): 2 CM^2
BH CV ECHO MEAS - BSA(HAYCOCK): 2.1 M^2
BH CV ECHO MEAS - BSA: 2 M^2
BH CV ECHO MEAS - BZI_BMI: 33.8 KILOGRAMS/M^2
BH CV ECHO MEAS - BZI_METRIC_HEIGHT: 165.1 CM
BH CV ECHO MEAS - BZI_METRIC_WEIGHT: 92.1 KG
BH CV ECHO MEAS - CONTRAST EF (2CH): 51.3 ML/M^2
BH CV ECHO MEAS - CONTRAST EF 4CH: 52 ML/M^2
BH CV ECHO MEAS - EDV(MOD-SP2): 76 ML
BH CV ECHO MEAS - EDV(MOD-SP4): 75 ML
BH CV ECHO MEAS - EDV(TEICH): 130.9 ML
BH CV ECHO MEAS - EF(CUBED): 63.7 %
BH CV ECHO MEAS - EF(MOD-SP2): 51.3 %
BH CV ECHO MEAS - EF(MOD-SP4): 52 %
BH CV ECHO MEAS - EF(TEICH): 54.8 %
BH CV ECHO MEAS - ESV(MOD-SP2): 37 ML
BH CV ECHO MEAS - ESV(MOD-SP4): 36 ML
BH CV ECHO MEAS - ESV(TEICH): 59.1 ML
BH CV ECHO MEAS - FS: 28.7 %
BH CV ECHO MEAS - IVS/LVPW: 1
BH CV ECHO MEAS - IVSD: 0.92 CM
BH CV ECHO MEAS - LAT PEAK E' VEL: 6 CM/SEC
BH CV ECHO MEAS - LV DIASTOLIC VOL/BSA (35-75): 37.7 ML/M^2
BH CV ECHO MEAS - LV MASS(C)D: 171 GRAMS
BH CV ECHO MEAS - LV MASS(C)DI: 85.9 GRAMS/M^2
BH CV ECHO MEAS - LV MAX PG: 2.2 MMHG
BH CV ECHO MEAS - LV MEAN PG: 1.1 MMHG
BH CV ECHO MEAS - LV SYSTOLIC VOL/BSA (12-30): 18.1 ML/M^2
BH CV ECHO MEAS - LV V1 MAX: 73.7 CM/SEC
BH CV ECHO MEAS - LV V1 MEAN: 47.4 CM/SEC
BH CV ECHO MEAS - LV V1 VTI: 14.2 CM
BH CV ECHO MEAS - LVIDD: 5.2 CM
BH CV ECHO MEAS - LVIDS: 3.7 CM
BH CV ECHO MEAS - LVLD AP2: 7.4 CM
BH CV ECHO MEAS - LVLD AP4: 7.2 CM
BH CV ECHO MEAS - LVLS AP2: 5.8 CM
BH CV ECHO MEAS - LVLS AP4: 6.1 CM
BH CV ECHO MEAS - LVOT AREA (M): 3.5 CM^2
BH CV ECHO MEAS - LVOT AREA: 3.5 CM^2
BH CV ECHO MEAS - LVOT DIAM: 2.1 CM
BH CV ECHO MEAS - LVPWD: 0.88 CM
BH CV ECHO MEAS - MED PEAK E' VEL: 7 CM/SEC
BH CV ECHO MEAS - MV A DUR: 0.11 SEC
BH CV ECHO MEAS - MV A MAX VEL: 93.8 CM/SEC
BH CV ECHO MEAS - MV DEC SLOPE: 256.1 CM/SEC^2
BH CV ECHO MEAS - MV DEC TIME: 0.27 SEC
BH CV ECHO MEAS - MV E MAX VEL: 57.8 CM/SEC
BH CV ECHO MEAS - MV E/A: 0.62
BH CV ECHO MEAS - MV MAX PG: 4.3 MMHG
BH CV ECHO MEAS - MV MEAN PG: 1.8 MMHG
BH CV ECHO MEAS - MV P1/2T MAX VEL: 61.3 CM/SEC
BH CV ECHO MEAS - MV P1/2T: 70.1 MSEC
BH CV ECHO MEAS - MV V2 MAX: 104.2 CM/SEC
BH CV ECHO MEAS - MV V2 MEAN: 62.4 CM/SEC
BH CV ECHO MEAS - MV V2 VTI: 18.7 CM
BH CV ECHO MEAS - MVA P1/2T LCG: 3.6 CM^2
BH CV ECHO MEAS - MVA(P1/2T): 3.1 CM^2
BH CV ECHO MEAS - MVA(VTI): 2.6 CM^2
BH CV ECHO MEAS - PA MAX PG (FULL): 2.5 MMHG
BH CV ECHO MEAS - PA MAX PG: 4.4 MMHG
BH CV ECHO MEAS - PA V2 MAX: 104.5 CM/SEC
BH CV ECHO MEAS - PULM A REVS DUR: 0.09 SEC
BH CV ECHO MEAS - PULM A REVS VEL: 28.6 CM/SEC
BH CV ECHO MEAS - PULM DIAS VEL: 34 CM/SEC
BH CV ECHO MEAS - PULM S/D: 1.4
BH CV ECHO MEAS - PULM SYS VEL: 48.5 CM/SEC
BH CV ECHO MEAS - PVA(V,A): 2.4 CM^2
BH CV ECHO MEAS - PVA(V,D): 2.4 CM^2
BH CV ECHO MEAS - QP/QS: 1.1
BH CV ECHO MEAS - RAP SYSTOLE: 3 MMHG
BH CV ECHO MEAS - RV MAX PG: 1.9 MMHG
BH CV ECHO MEAS - RV MEAN PG: 0.74 MMHG
BH CV ECHO MEAS - RV V1 MAX: 68.9 CM/SEC
BH CV ECHO MEAS - RV V1 MEAN: 35.6 CM/SEC
BH CV ECHO MEAS - RV V1 VTI: 14.7 CM
BH CV ECHO MEAS - RVOT AREA: 3.7 CM^2
BH CV ECHO MEAS - RVOT DIAM: 2.2 CM
BH CV ECHO MEAS - SI(AO): 119.2 ML/M^2
BH CV ECHO MEAS - SI(CUBED): 45.6 ML/M^2
BH CV ECHO MEAS - SI(LVOT): 24.7 ML/M^2
BH CV ECHO MEAS - SI(MOD-SP2): 19.6 ML/M^2
BH CV ECHO MEAS - SI(MOD-SP4): 19.6 ML/M^2
BH CV ECHO MEAS - SI(TEICH): 36.1 ML/M^2
BH CV ECHO MEAS - SUP REN AO DIAM: 1.5 CM
BH CV ECHO MEAS - SV(AO): 237.2 ML
BH CV ECHO MEAS - SV(CUBED): 90.8 ML
BH CV ECHO MEAS - SV(LVOT): 49.2 ML
BH CV ECHO MEAS - SV(MOD-SP2): 39 ML
BH CV ECHO MEAS - SV(MOD-SP4): 39 ML
BH CV ECHO MEAS - SV(RVOT): 54.4 ML
BH CV ECHO MEAS - SV(TEICH): 71.8 ML
BH CV ECHO MEAS - TAPSE (>1.6): 1.7 CM2
BH CV XLRA - RV BASE: 3.2 CM
BH CV XLRA - TDI S': 12 CM/SEC
E/E' RATIO: 6.5
LEFT ATRIUM VOLUME INDEX: 13 ML/M2
SINUS: 3.3 CM
STJ: 3 CM

## 2017-09-21 PROCEDURE — 93306 TTE W/DOPPLER COMPLETE: CPT

## 2017-09-21 PROCEDURE — 93306 TTE W/DOPPLER COMPLETE: CPT | Performed by: INTERNAL MEDICINE

## 2017-10-30 RX ORDER — ALENDRONATE SODIUM 70 MG/1
70 TABLET ORAL
Qty: 4 TABLET | Refills: 3 | Status: SHIPPED | OUTPATIENT
Start: 2017-10-30 | End: 2018-07-18

## 2018-03-20 ENCOUNTER — CLINICAL SUPPORT NO REQUIREMENTS (OUTPATIENT)
Dept: CARDIOLOGY | Facility: CLINIC | Age: 53
End: 2018-03-20

## 2018-03-20 ENCOUNTER — OFFICE VISIT (OUTPATIENT)
Dept: CARDIOLOGY | Facility: CLINIC | Age: 53
End: 2018-03-20

## 2018-03-20 VITALS
SYSTOLIC BLOOD PRESSURE: 118 MMHG | OXYGEN SATURATION: 96 % | DIASTOLIC BLOOD PRESSURE: 78 MMHG | HEART RATE: 76 BPM | BODY MASS INDEX: 33.32 KG/M2 | WEIGHT: 200 LBS | HEIGHT: 65 IN

## 2018-03-20 DIAGNOSIS — Z95.0 PACEMAKER: ICD-10-CM

## 2018-03-20 DIAGNOSIS — I42.9 CARDIOMYOPATHY, UNSPECIFIED TYPE (HCC): ICD-10-CM

## 2018-03-20 DIAGNOSIS — Z98.890 STATUS POST TRICUSPID VALVE REPAIR: ICD-10-CM

## 2018-03-20 DIAGNOSIS — R06.02 SHORTNESS OF BREATH: ICD-10-CM

## 2018-03-20 DIAGNOSIS — I50.42 CHRONIC COMBINED SYSTOLIC AND DIASTOLIC CONGESTIVE HEART FAILURE (HCC): ICD-10-CM

## 2018-03-20 DIAGNOSIS — Z87.74 STATUS POST PATCH CLOSURE OF ASD: Primary | ICD-10-CM

## 2018-03-20 DIAGNOSIS — I50.22 CHRONIC SYSTOLIC CONGESTIVE HEART FAILURE (HCC): Primary | ICD-10-CM

## 2018-03-20 PROCEDURE — 93281 PM DEVICE PROGR EVAL MULTI: CPT | Performed by: INTERNAL MEDICINE

## 2018-03-20 PROCEDURE — 99213 OFFICE O/P EST LOW 20 MIN: CPT | Performed by: INTERNAL MEDICINE

## 2018-04-04 NOTE — PROGRESS NOTES
Date of Office Visit: 2018  Encounter Provider: Lion Marques MD  Place of Service: [facility] CARDIOLOGY  Patient Name: Whitney Schofield  :1965    Chief complaint: Follow-up for open ASD closure, nonischemic cardiomyopathy,   chronic combined CHF, tricuspid valve repair, and pacemaker placement.    History of Present Illness:    Dear Dr. Meier:       I again had the pleasure of seeing your patient in cardiology office on 2018. As  you well know, she is a very pleasant 52 year old white female with a past history  significant for multiple cardiovascular issues who presents for follow-up. The patient   originally presented to [facility] with symptoms of palpitations and  shortness of breath in 2009. She subsequently was diagnosed with Hashimoto  thyroiditis, and underwent a partial thyroidectomy in 2010. As part of a routine  follow-up, she had a two-dimensional echocardiogram performed on 2010 which   revealed a severely dilated right ventricle and significant pulmonary hypertension. A CT   scan of her chest did not show any evidence for pulmonary embolus or other acute lung   pathology.  She underwent a transesophageal echocardiogram on 2010 which   revealed a large secundum ASD high in the atrial septum. 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 She was referred to   Dr. Harper for evaluation to surgically repair the ASD, and underwent a preoperative   cardiac catheterization on 2010. This revealed angiographically normal coronary   arteries, with a Qp:Qs of 2.3, consistent with a large shunt. The patient underwent   closure of the ASD, as well as tricuspid valve repair with an annuloplasty ring by Dr. Harper at [facility] on 2010. Her postoperative period was complicated by   complete AV block and accelerated junctional escape rhythm. She underwent  "  placement of a permanent pacemaker on 05/11/2010.      The patient continued to have significant shortness of breath and a 24-hour Holter   monitor and two-dimensional echocardiogram were obtained on 05/12/2011. Her   Holter monitor did show several brief episodes of tachycardia. The echocardiogram   did not show any significant findings other than a \"shuttering motion\" of the septum.   She underwent a right and left heart catheterization on 07/20/2011 to assess for any   evidence of constriction. There was no evidence of restriction or constriction on the   heart catheterization, although she did have pulmonary hypertension with a mean   pulmonary artery pressure of 30 mmHg. A transesophageal echocardiogram was also   performed on 06/20/2011 which revealed an ejection fraction of 55-60%, and an intact   ASD repair. An electrophysiology study was performed by Dr. Foster on 08/01/2011.   This was performed through her pacemaker in conjunction with an isoproterenol   infusion. This showed no inducible tachycardia or ventricular tachycardia.       The patient eventually wore a 21-day event recorder and was noted to go into   wide-complex tachycardia on 09/30/2012. She had multiple other episodes as well.   Dr. Aguilar from Electrophysiology saw the patient and felt this represented ventricular   tachycardia. She did ultimately undergo a repeat electrophysiology study on   11/20/2012 by Dr. Aguilar which confirmed complete infra-hisian block with no   conduction retrograde or antegrade. No ventricular tachycardia was induced. She   was started on beta blockers at that time.       At the patient's visit in 03/2013 she complained of severe volume retention and edema.    She was also more short of breath and had gained a significant amount of weight.   She was admitted to the hospital on 03/25/2013 and underwent a transesophageal   echocardiogram.  Her ejection fraction had decreased significantly to 35%, and there   was an " extensive amount of thrombus formation on her pacemaker wires. Her ASD   repair appeared to be in good condition. She did undergo a CT scan of the chest   which showed no evidence for pulmonary embolism, as well as a CT scan of the   abdomen which showed no evidence for inferior vena cava obstruction or clotting.   She was diuresed extensively and placed on Coumadin at that time. 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 She ultimately underwent left   ventricular lead placement and pacemaker replacement on 08/19/2013 by Dr. Aguilar.   She then had right atrial lead dislodgement and diaphragmatic stimulation. A right   atrial lead revision was performed on 02/19/2014; however, the patient developed a   significant left subclavian vein thrombosis post procedurally. She again was placed   on Coumadin at that time, which was then switched to Xarelto. She ultimately   underwent laser lead extraction of the abandoned right atrial lead on 04/17/2014. She   did undergo a right heart cath on 04/13/2015 which showed only mild pulmonary   hypertension (mean PA pressure 29 mm Hg).      The patient presents today for follow-up.  Unfortunately, she has been recently   diagnosed with celiac disease.  She is also having severe issues with the   rheumatoid arthritis.  Her mobility is significantly impaired, even worse than before.    Her edema has been fairly well controlled, and she is only taking the Bumex when   needed.  Her shortness of breath is the same, and occurs with moderate exertion.    She has not had any chest discomfort.  She did have some brief atrial tachycardia   noted on her pacer interrogation today.       Past Medical History:   Diagnosis Date   • Anemia    • ASD (atrial septal defect)    • Asthma    • Asystole     Post ASD closure asystole and high-grade AV block.  s/p pacemaker 5/11/10.   • Celiac disease    • DVT (deep venous thrombosis)     Left subclavian DVT following right atrial  pacemaker lead revision in 2/14   • Edema    • Hashimoto's thyroiditis     s/p partial thyroidectomy 1/13/10   • Heart palpitations    • Hx of thyroid cancer    • Malignant neoplasm of thyroid gland     Papillary carcinoma of the thyroid - s/p residual thyroidectomy in 2/11.   • Migraine    • Nonischemic cardiomyopathy     EF as low as 35% 3/13.  EF 6/21/13 by CHANDANA was 50-55%, and 53% by echo on 6/17/14.  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  EF 50% by CHANDANA on 4/18/16.   • NSVT (nonsustained ventricular tachycardia)     Noted by event recorder 9/12.  EP study by Dr. Aguilar on 11/20/12 showed no inducible VT.   • LUKAS on CPAP    • Papillary thyroid carcinoma 08/27/2013   • Rheumatoid arthritis     Severe and debilitating    • Secundum ASD     s/p open closure with a PeriPatch xenograft by Dr. Harper on 5/6/10 by Dr. Harper at [facility].   • SOB (shortness of breath)     Multifactorial    • Thrombus due to any device, implant or graft     Extensive thrombus formation on the pacemaker leads by CHANDANA 3/25/13.  Initiated on Coumadin transiently at that time - improved significantly by CHANDANA 6/21/13.       Past Surgical History:   Procedure Laterality Date   • ASD AND VSD REPAIR     • CHOLECYSTECTOMY  2007   • ENDOSCOPY AND COLONOSCOPY  12/30/2015    Tom Orta MD   • PACEMAKER IMPLANTATION  05/2010    Inital dual-chamber pacemaker placed 5/11/10 after ASD closure surgery.  Had LV lead placement and generator change on 8/19/13 by Dr. Aguilar (Medtronic #[id number]).  Then had right atrial lead revision on 2/19/14 (for right atrial lead dislodgement and diaphragmatic stimulation) - developed left subclavian DVT afterwards.  Then had laser lead extraction of the abandoned right atrial lead on 4/17/14.   • REPLACEMENT TOTAL KNEE BILATERAL     • THYROID LOBECTOMY Right 2012   • THYROIDECTOMY Left 2011    Dr. Coronado, radioactive iodine X3   • TONSILLECTOMY     • TRICUSPID VALVE SURGERY      #32 MC3 annuloplasty  ring by Dr. Harper on 5/6/10 (at time of ASD closure)       Current Outpatient Prescriptions on File Prior to Visit   Medication Sig Dispense Refill   • alendronate (FOSAMAX) 70 MG tablet Take 1 tablet by mouth Every 7 (Seven) Days. Set up for 30 minutes drink and ounces water and do not eat 4 tablet 3   • bumetanide (BUMEX) 2 MG tablet Take 1 tablet by mouth daily. (Patient taking differently: Take 2 mg by mouth As Needed.) 90 tablet 3   • carvedilol (COREG) 3.125 MG tablet TAKE 1 TABLET TWICE A  tablet 2   • Ergocalciferol (VITAMIN D2) 2000 UNITS tablet Take  by mouth. Pt take 5000 units     • esomeprazole (NexIUM) 40 MG capsule Take 40 mg by mouth. Prn       • Ferrous Sulfate Dried 200 (65 FE) MG tablet Take 200 mg by mouth.     • folic acid (FOLVITE) 1 MG tablet Take 1 mg by mouth daily.     • HYDROcodone-acetaminophen (NORCO)  MG per tablet Take  tablets by mouth.     • ibuprofen (ADVIL,MOTRIN) 400 MG tablet Take 400 mg by mouth Every 4 (Four) Hours.     • leflunomide (ARAVA) 20 MG tablet Take 20 mg by mouth daily.     • levothyroxine (SYNTHROID, LEVOTHROID) 300 MCG tablet Take 1 tablet by mouth daily.  0   • losartan (COZAAR) 25 MG tablet TAKE 1 TABLET DAILY 90 tablet 2   • methotrexate 2.5 MG tablet Take 20 mg by mouth 1 (one) time per week.     • potassium chloride (MICRO-K) 10 MEQ CR capsule Take 10 mEq by mouth As Needed.       No current facility-administered medications on file prior to visit.      Allergies as of 03/20/2018 - Reviewed 03/20/2018   Allergen Reaction Noted   • Ferumoxytol  02/10/2017   • Azithromycin Rash 06/02/2012     Social History     Social History   • Marital status:      Spouse name: N/A   • Number of children: 2   • Years of education: N/A     Occupational History   •       Social History Main Topics   • Smoking status: Never Smoker   • Smokeless tobacco: Never Used   • Alcohol use No   • Drug use: No   • Sexual activity: Yes     Partners:  "Male     Other Topics Concern   • Not on file     Social History Narrative   • No narrative on file     Family History   Problem Relation Age of Onset   • COPD Mother    • Heart failure Mother      CHF   • Lung disease Mother    • Hypertension Mother    • Diabetes Mother    • Coronary artery disease Mother    • Diabetes Sister    • Breast cancer Maternal Aunt        Review of Systems   Constitution: Positive for weakness and malaise/fatigue.   Cardiovascular: Positive for dyspnea on exertion and palpitations.   Respiratory: Positive for shortness of breath.    Musculoskeletal: Positive for arthritis, back pain, joint pain, joint swelling and myalgias.   Neurological: Positive for light-headedness.   All other systems reviewed and are negative.     Objective:     Vitals:    03/20/18 1410   BP: 118/78   Pulse: 76   SpO2: 96%   Weight: 90.7 kg (200 lb)   Height: 165.1 cm (65\")     Body mass index is 33.28 kg/m².    Physical Exam   Constitutional: She is oriented to person, place, and time. She appears well-developed and well-nourished.   HENT:   Head: Normocephalic and atraumatic.   Eyes: Conjunctivae are normal.   Neck: Neck supple.   Cardiovascular: Normal rate and regular rhythm.  Exam reveals no friction rub.    Murmur heard.   Systolic murmur is present with a grade of 2/6  at the upper left sternal border, lower left sternal border  Pulmonary/Chest: Effort normal and breath sounds normal. She has no rales.   Abdominal: Soft. There is no tenderness.   Musculoskeletal: She exhibits no edema.   Neurological: She is alert and oriented to person, place, and time.   Skin: Skin is warm.   Psychiatric: She has a normal mood and affect. Her behavior is normal.     Lab Review:   Procedures    Cardiac Procedures:  1. Status post closure of a large secundum ASD with a PeriPatch xenograft with concomitant  tricuspid valve repair with a #32 MC3 annuloplasty ring by Dr. Jaime Harper on 05/06/2010   at [facility].    2. " Left heart catheterization on 03/26/2010 showed angiographically normal coronary  arteries.    3. Transesophageal echocardiogram on 06/21/2013 revealed an ejection fraction of 50-55%.    4. Status post placement of a dual chamber permanent pacemaker by Dr. Bush on   05/11/2010 following postoperative asystole and complete AV block after her ASD surgery.    5. Status post left ventricular lead placement and pacemaker replacement on 08/19/2013 by  Dr. Aguilar. At that time she had a left ventricular lead added, to make this a  biventricular pacemaker. The generator was also changed and is a Medtronic #[id number].    6. Status post right atrial lead revision on 02/19/2014. This was secondary to right atrial   lead dislodgement and diaphragmatic stimulation. She did develop a left subclavian vein   thrombosis following the procedure.    7. Status post laser lead extraction of the abandoned right atrial lead on 04/17/2014 by Dr. Christiano Aguilar at [facility].    8. CHANDANA on 04/18/2016: Ejection fraction was 50%, the left ventricle was mildly dilated, there   was grade 1 diastolic dysfunction, the ASD closure site was intact with no evidence for   shunting, there was no evidence of vegetation or thrombus formation on the pacemaker   leads, there was mild mitral regurgitation, and there was trace to mild tricuspid regurgitation   through the annuloplasty ring.  9.  Echocardiogram on 9/21/2017: Ejection fraction was 52%.  Septal wall motion was   consistent with RV pacing.  There was grade 1 diastolic dysfunction.  The right ventricle   was mildly enlarged with normal function.  There was no significant valvular disease.    Assessment:       Diagnosis Plan   1. Status post patch closure of ASD     2. Pacemaker     3. Cardiomyopathy, unspecified type     4. Chronic combined systolic and diastolic congestive heart failure     5. Shortness of breath     6. Status post tricuspid valve repair       Plan:       The patient's main issue  continues to be her severe and debilitating rheumatoid arthritis.  I   have again strongly encouraged her to consider disability from work.  I would support her in   every way for this, even from a cardiac standpoint.  Her most recent echocardiogram   showed an ejection fraction of 52%, and no significant valve issues.  Her tricuspid valve   repair appears to be doing well.  She did have some atrial tachycardia on her pacer   interrogation from today. The longest episode was 10 minutes, and occurred on 10/25/2017.    There was some concern that this might be atrial fibrillation, although I do not feel that this   represents atrial fibrillation after looking at the strips in detail.  Her pacemaker is still   functioning normally, and she has to years in 3 months left on the device.  She is   pacemaker dependent.  Her edema has been well controlled recently, and she is only   taking the Bumex when needed.  She is going to continue on the losartan at 25 mg per   day, as well as the carvedilol at 3.125 mg twice a day.  Her blood pressure is 118/78   today.  For now, I will plan on seeing her back in the office within the next 6 months   unless other issues arise.    Heart Failure  Assessment  • NYHA class II - There is slight limitation of physical activity. The patient is comfortable at rest, but physical activity results in fatigue, palpitations or shortness of breath.  • Beta blocker prescribed  • Diuretics prescribed  • Angiotensin receptor blocker (ARB) prescribed  • The most recent ejection fraction is 52%  • Left ventricular function is normal by qualitative assessment  • The left ventricle was last assessed on 9/21/2017    Plan  • The patient has received heart failure education on the following topics: dietary sodium restriction, medication instructions, minimizing or avoiding NSAID use, symptom management, physical activity, weight monitoring and minimizing alcohol intake  • The heart failure care plan was  discussed with the patient today including: continuing the current program  •  The patient was not counseled about ICD or CRT-D implantation    Subjective/Objective  • The patient reports dyspnea    • Physical exam findings negative for rales and peripheral edema.

## 2018-05-03 ENCOUNTER — OFFICE VISIT (OUTPATIENT)
Dept: FAMILY MEDICINE CLINIC | Facility: CLINIC | Age: 53
End: 2018-05-03

## 2018-05-03 VITALS
SYSTOLIC BLOOD PRESSURE: 116 MMHG | OXYGEN SATURATION: 99 % | TEMPERATURE: 98.7 F | WEIGHT: 205 LBS | RESPIRATION RATE: 18 BRPM | BODY MASS INDEX: 34.16 KG/M2 | HEIGHT: 65 IN | DIASTOLIC BLOOD PRESSURE: 76 MMHG | HEART RATE: 87 BPM

## 2018-05-03 DIAGNOSIS — N39.0 URINARY TRACT INFECTION WITHOUT HEMATURIA, SITE UNSPECIFIED: ICD-10-CM

## 2018-05-03 DIAGNOSIS — M25.512 BILATERAL SHOULDER PAIN, UNSPECIFIED CHRONICITY: ICD-10-CM

## 2018-05-03 DIAGNOSIS — Z95.0 PACEMAKER: ICD-10-CM

## 2018-05-03 DIAGNOSIS — D17.1 LIPOMA OF TORSO: ICD-10-CM

## 2018-05-03 DIAGNOSIS — M79.672 FOOT PAIN, BILATERAL: ICD-10-CM

## 2018-05-03 DIAGNOSIS — G47.00 INSOMNIA, UNSPECIFIED TYPE: ICD-10-CM

## 2018-05-03 DIAGNOSIS — M25.511 BILATERAL SHOULDER PAIN, UNSPECIFIED CHRONICITY: ICD-10-CM

## 2018-05-03 DIAGNOSIS — M79.671 FOOT PAIN, BILATERAL: ICD-10-CM

## 2018-05-03 DIAGNOSIS — R30.0 DYSURIA: Primary | ICD-10-CM

## 2018-05-03 DIAGNOSIS — Z86.718 HISTORY OF DVT (DEEP VEIN THROMBOSIS): ICD-10-CM

## 2018-05-03 DIAGNOSIS — Z85.850 HISTORY OF THYROID CANCER: ICD-10-CM

## 2018-05-03 DIAGNOSIS — I42.9 CARDIOMYOPATHY, UNSPECIFIED TYPE (HCC): ICD-10-CM

## 2018-05-03 DIAGNOSIS — M06.9 RHEUMATOID ARTHRITIS, INVOLVING UNSPECIFIED SITE, UNSPECIFIED RHEUMATOID FACTOR PRESENCE: ICD-10-CM

## 2018-05-03 LAB
BACTERIA UR QL AUTO: ABNORMAL /HPF
BILIRUB UR QL STRIP: NEGATIVE
CLARITY UR: CLEAR
COLOR UR: YELLOW
GLUCOSE UR STRIP-MCNC: NEGATIVE MG/DL
HGB UR QL STRIP.AUTO: NEGATIVE
KETONES UR QL STRIP: NEGATIVE
LEUKOCYTE ESTERASE UR QL STRIP.AUTO: ABNORMAL
NITRITE UR QL STRIP: NEGATIVE
PH UR STRIP.AUTO: 7 [PH] (ref 4.6–8)
PROT UR QL STRIP: NEGATIVE
RBC # UR: ABNORMAL /HPF
REF LAB TEST METHOD: ABNORMAL
SP GR UR STRIP: 1.02 (ref 1–1.03)
SQUAMOUS #/AREA URNS HPF: ABNORMAL /HPF
UROBILINOGEN UR QL STRIP: ABNORMAL
WBC UR QL AUTO: ABNORMAL /HPF

## 2018-05-03 PROCEDURE — 81001 URINALYSIS AUTO W/SCOPE: CPT | Performed by: FAMILY MEDICINE

## 2018-05-03 PROCEDURE — 99204 OFFICE O/P NEW MOD 45 MIN: CPT | Performed by: FAMILY MEDICINE

## 2018-05-03 RX ORDER — TRAZODONE HYDROCHLORIDE 50 MG/1
50 TABLET ORAL NIGHTLY
Qty: 30 TABLET | Refills: 3 | Status: SHIPPED | OUTPATIENT
Start: 2018-05-03 | End: 2018-07-18

## 2018-05-03 RX ORDER — CIPROFLOXACIN 500 MG/1
500 TABLET, FILM COATED ORAL 2 TIMES DAILY
Qty: 14 TABLET | Refills: 0 | Status: SHIPPED | OUTPATIENT
Start: 2018-05-03 | End: 2018-05-10

## 2018-05-03 NOTE — PROGRESS NOTES
SUBJECTIVE:  The patient is a 52-year-old white female comes in for several reasons.  She has [employer] insurance and needs official referrals to her primary care provider.  She has multiple medical problems.  She has insomnia and urinary frequency with dysuria today.  She also has shoulder and feet pain and a growth on the trunk.    PAST MEDICAL HISTORY:  Reviewed.    REVIEW OF SYSTEMS:  Please see above; 14 point ROS negative.      OBJECTIVE: Vitals signs are reviewed and are stable.    HEENT: PERRLA.   Neck:  Supple.   Lungs:  Clear.    Heart:  Regular rate and rhythm.   Abdomen:   Soft, nontender.   Extremities:  No cyanosis, clubbing or edema.  Hallux valgus deformity both feet.  Patient's shoulders are both tender to touch and she cannot fully abduct on either side.    ASSESSMENT:   Insomnia  Shoulder pain  Bunions   Rheumatoid arthritis  Obesity  Anemia  History of thyroid cancer  History of V. tach    PLAN: Referrals are made.  Trazodone 50 mg daily at bedtime.

## 2018-05-21 ENCOUNTER — TELEPHONE (OUTPATIENT)
Dept: CARDIOLOGY | Facility: CLINIC | Age: 53
End: 2018-05-21

## 2018-05-21 RX ORDER — AMOXICILLIN AND CLAVULANATE POTASSIUM 875; 125 MG/1; MG/1
1 TABLET, FILM COATED ORAL EVERY 12 HOURS SCHEDULED
Qty: 14 TABLET | Refills: 1 | Status: SHIPPED | OUTPATIENT
Start: 2018-05-21 | End: 2018-07-18

## 2018-05-21 NOTE — TELEPHONE ENCOUNTER
Pt has a tooth infection & needs an antibiotic, since she doesn't have a dentist yet. States she needs a full rx for the infection. She is allergic to azithromycin. Pharmacy [facility] on [address]. Pt # [phone]. dmk

## 2018-05-21 NOTE — TELEPHONE ENCOUNTER
[place],    Could you please let her know I ordered Augmentin bid for 7 days?  Prescription was sent to [facility].  Thanks    VIOLETTE

## 2018-05-25 ENCOUNTER — TELEPHONE (OUTPATIENT)
Dept: FAMILY MEDICINE CLINIC | Facility: CLINIC | Age: 53
End: 2018-05-25

## 2018-05-25 DIAGNOSIS — M06.9 RHEUMATOID ARTHRITIS, INVOLVING UNSPECIFIED SITE, UNSPECIFIED RHEUMATOID FACTOR PRESENCE: Primary | ICD-10-CM

## 2018-05-25 NOTE — TELEPHONE ENCOUNTER
Patient called and needs a referral put in today for her rheumatologist bc she has an appt Tuesday and her previous referral had .. Dr robles is her rheumatologist she said.

## 2018-06-01 PROBLEM — K31.84 GASTROPARESIS: Status: ACTIVE | Noted: 2018-06-01

## 2018-06-12 ENCOUNTER — PREP FOR SURGERY (OUTPATIENT)
Dept: OTHER | Facility: HOSPITAL | Age: 53
End: 2018-06-12

## 2018-06-12 DIAGNOSIS — D17.1 LIPOMA OF ABDOMINAL WALL: Primary | ICD-10-CM

## 2018-06-12 RX ORDER — CEFAZOLIN SODIUM 2 G/100ML
2 INJECTION, SOLUTION INTRAVENOUS ONCE
Status: CANCELLED | OUTPATIENT
Start: 2018-07-20 | End: 2018-07-20

## 2018-06-13 PROBLEM — D17.1 LIPOMA OF ABDOMINAL WALL: Status: ACTIVE | Noted: 2018-06-13

## 2018-07-18 ENCOUNTER — APPOINTMENT (OUTPATIENT)
Dept: PREADMISSION TESTING | Facility: HOSPITAL | Age: 53
End: 2018-07-18

## 2018-07-18 VITALS
SYSTOLIC BLOOD PRESSURE: 129 MMHG | DIASTOLIC BLOOD PRESSURE: 83 MMHG | RESPIRATION RATE: 16 BRPM | BODY MASS INDEX: 33.77 KG/M2 | OXYGEN SATURATION: 99 % | WEIGHT: 202.7 LBS | HEART RATE: 67 BPM | TEMPERATURE: 98.8 F | HEIGHT: 65 IN

## 2018-07-18 LAB
ANION GAP SERPL CALCULATED.3IONS-SCNC: 12.4 MMOL/L
BUN BLD-MCNC: 18 MG/DL (ref 6–20)
BUN/CREAT SERPL: 22.2 (ref 7–25)
CALCIUM SPEC-SCNC: 8.3 MG/DL (ref 8.6–10.5)
CHLORIDE SERPL-SCNC: 99 MMOL/L (ref 98–107)
CO2 SERPL-SCNC: 26.6 MMOL/L (ref 22–29)
CREAT BLD-MCNC: 0.81 MG/DL (ref 0.57–1)
DEPRECATED RDW RBC AUTO: 54.1 FL (ref 37–54)
ERYTHROCYTE [DISTWIDTH] IN BLOOD BY AUTOMATED COUNT: 17.8 % (ref 11.7–13)
GFR SERPL CREATININE-BSD FRML MDRD: 74 ML/MIN/1.73
GLUCOSE BLD-MCNC: 99 MG/DL (ref 65–99)
HCT VFR BLD AUTO: 39.6 % (ref 35.6–45.5)
HGB BLD-MCNC: 12.4 G/DL (ref 11.9–15.5)
MCH RBC QN AUTO: 25.6 PG (ref 26.9–32)
MCHC RBC AUTO-ENTMCNC: 31.3 G/DL (ref 32.4–36.3)
MCV RBC AUTO: 81.6 FL (ref 80.5–98.2)
PLATELET # BLD AUTO: 264 10*3/MM3 (ref 140–500)
PMV BLD AUTO: 9.3 FL (ref 6–12)
POTASSIUM BLD-SCNC: 4 MMOL/L (ref 3.5–5.2)
RBC # BLD AUTO: 4.85 10*6/MM3 (ref 3.9–5.2)
SODIUM BLD-SCNC: 138 MMOL/L (ref 136–145)
WBC NRBC COR # BLD: 6.16 10*3/MM3 (ref 4.5–10.7)

## 2018-07-18 PROCEDURE — 85027 COMPLETE CBC AUTOMATED: CPT | Performed by: SURGERY

## 2018-07-18 PROCEDURE — 80048 BASIC METABOLIC PNL TOTAL CA: CPT | Performed by: SURGERY

## 2018-07-18 PROCEDURE — 93005 ELECTROCARDIOGRAM TRACING: CPT

## 2018-07-18 PROCEDURE — 36415 COLL VENOUS BLD VENIPUNCTURE: CPT

## 2018-07-18 PROCEDURE — 93010 ELECTROCARDIOGRAM REPORT: CPT | Performed by: INTERNAL MEDICINE

## 2018-07-18 RX ORDER — TRAZODONE HYDROCHLORIDE 50 MG/1
50 TABLET ORAL NIGHTLY PRN
COMMUNITY
End: 2019-03-13 | Stop reason: HOSPADM

## 2018-07-18 RX ORDER — ERGOCALCIFEROL 1.25 MG/1
50000 CAPSULE ORAL 3 TIMES WEEKLY
COMMUNITY

## 2018-07-18 RX ORDER — CARVEDILOL 3.12 MG/1
3.12 TABLET ORAL 2 TIMES DAILY PRN
COMMUNITY
End: 2020-02-25

## 2018-07-18 RX ORDER — LOSARTAN POTASSIUM 25 MG/1
25 TABLET ORAL DAILY PRN
COMMUNITY

## 2018-07-18 RX ORDER — OMEPRAZOLE 40 MG/1
40 CAPSULE, DELAYED RELEASE ORAL DAILY
COMMUNITY

## 2018-07-18 NOTE — DISCHARGE INSTRUCTIONS
Take the following medications the morning of surgery with a small sip of water:  HYDROCODONE  OMEPRAZOLE  BRING LOSARTAN AND CARVEDILOL    ARRIVE AT 0930.        General Instructions:  • Do not eat solid food after midnight the night before surgery.  • You may drink clear liquids day of surgery but must stop at least one hour before your hospital arrival time.  • It is beneficial for you to have a clear drink that contains carbohydrates the day of surgery.  We suggest a 12 to 20 ounce bottle of Gatorade or Powerade for non-diabetic patients or a 12 to 20 ounce bottle of G2 or Powerade Zero for diabetic patients. (Pediatric patients, are not advised to drink a 12 to 20 ounce carbohydrate drink)    Clear liquids are liquids you can see through.  Nothing red in color.     Plain water                               Sports drinks  Sodas                                   Gelatin (Jell-O)  Fruit juices without pulp such as white grape juice and apple juice  Popsicles that contain no fruit or yogurt  Tea or coffee (no cream or milk added)  Gatorade / Powerade  G2 / Powerade Zero    • Infants may have breast milk up to four hours before surgery.  • Infants drinking formula may drink formula up to six hours before surgery.   • Patients who avoid smoking, chewing tobacco and alcohol for 4 weeks prior to surgery have a reduced risk of post-operative complications.  Quit smoking as many days before surgery as you can.  • Do not smoke, use chewing tobacco or drink alcohol the day of surgery.   • If applicable bring your C-PAP/ BI-PAP machine.  • Bring any papers given to you in the doctor’s office.  • Wear clean comfortable clothes and socks.  • Do not wear contact lenses or make-up.  Bring a case for your glasses.   • Bring crutches or walker if applicable.  • Remove all piercings.  Leave jewelry and any other valuables at home.  • Hair extensions with metal clips must be removed prior to surgery.  • The Pre-Admission Testing  nurse will instruct you to bring medications if unable to obtain an accurate list in Pre-Admission Testing.        If you were given a blood bank ID arm band remember to bring it with you the day of surgery.    Preventing a Surgical Site Infection:  • For 2 to 3 days before surgery, avoid shaving with a razor because the razor can irritate skin and make it easier to develop an infection.    • Any areas of open skin can increase the risk of a post-operative wound infection by allowing bacteria to enter and travel throughout the body.  Notify your surgeon if you have any skin wounds / rashes even if it is not near the expected surgical site.  The area will need assessed to determine if surgery should be delayed until it is healed.  • The night prior to surgery sleep in a clean bed with clean clothing.  Do not allow pets to sleep with you.  • Shower on the morning of surgery using a fresh bar of anti-bacterial soap (such as Dial) and clean washcloth.  Dry with a clean towel and dress in clean clothing.  • Ask your surgeon if you will be receiving antibiotics prior to surgery.  • Make sure you, your family, and all healthcare providers clean their hands with soap and water or an alcohol based hand  before caring for you or your wound.    Day of surgery:  Upon arrival, a Pre-op nurse and Anesthesiologist will review your health history, obtain vital signs, and answer questions you may have.  The only belongings needed at this time will be your home medications and if applicable your C-PAP/BI-PAP machine.  If you are staying overnight your family can leave the rest of your belongings in the car and bring them to your room later.  A Pre-op nurse will start an IV and you may receive medication in preparation for surgery, including something to help you relax.  Your family will be able to see you in the Pre-op area.  While you are in surgery your family should notify the waiting room  if they leave the  waiting room area and provide a contact phone number.    Please be aware that surgery does come with discomfort.  We want to make every effort to control your discomfort so please discuss any uncontrolled symptoms with your nurse.   Your doctor will most likely have prescribed pain medications.      If you are going home after surgery you will receive individualized written care instructions before being discharged.  A responsible adult must drive you to and from the hospital on the day of your surgery and stay with you for 24 hours.    If you are staying overnight following surgery, you will be transported to your hospital room following the recovery period.  [facility] has all private rooms.    You have received a list of surgical assistants for your reference.  If you have any questions please call Pre-Admission Testing at [phone].  Deductibles and co-payments are collected on the day of service. Please be prepared to pay the required co-pay, deductible or deposit on the day of service as defined by your plan.

## 2018-07-20 ENCOUNTER — ANESTHESIA (OUTPATIENT)
Dept: PERIOP | Facility: HOSPITAL | Age: 53
End: 2018-07-20

## 2018-07-20 ENCOUNTER — ANESTHESIA EVENT (OUTPATIENT)
Dept: PERIOP | Facility: HOSPITAL | Age: 53
End: 2018-07-20

## 2018-07-20 ENCOUNTER — HOSPITAL ENCOUNTER (OUTPATIENT)
Facility: HOSPITAL | Age: 53
Setting detail: HOSPITAL OUTPATIENT SURGERY
Discharge: HOME | End: 2018-07-20
Attending: SURGERY | Admitting: SURGERY

## 2018-07-20 VITALS
DIASTOLIC BLOOD PRESSURE: 72 MMHG | RESPIRATION RATE: 16 BRPM | OXYGEN SATURATION: 97 % | SYSTOLIC BLOOD PRESSURE: 139 MMHG | TEMPERATURE: 97.6 F | HEART RATE: 62 BPM

## 2018-07-20 DIAGNOSIS — D17.1 LIPOMA OF ABDOMINAL WALL: ICD-10-CM

## 2018-07-20 PROCEDURE — 25010000002 MIDAZOLAM PER 1 MG: Performed by: ANESTHESIOLOGY

## 2018-07-20 PROCEDURE — 25010000002 ONDANSETRON PER 1 MG: Performed by: ANESTHESIOLOGY

## 2018-07-20 PROCEDURE — 25010000002 PROMETHAZINE PER 50 MG: Performed by: ANESTHESIOLOGY

## 2018-07-20 PROCEDURE — 88304 TISSUE EXAM BY PATHOLOGIST: CPT | Performed by: SURGERY

## 2018-07-20 PROCEDURE — 25010000002 ONDANSETRON PER 1 MG: Performed by: NURSE ANESTHETIST, CERTIFIED REGISTERED

## 2018-07-20 PROCEDURE — 25010000002 PROPOFOL 10 MG/ML EMULSION: Performed by: NURSE ANESTHETIST, CERTIFIED REGISTERED

## 2018-07-20 PROCEDURE — 25010000002 DEXAMETHASONE PER 1 MG: Performed by: NURSE ANESTHETIST, CERTIFIED REGISTERED

## 2018-07-20 PROCEDURE — 25010000002 SUCCINYLCHOLINE PER 20 MG: Performed by: NURSE ANESTHETIST, CERTIFIED REGISTERED

## 2018-07-20 PROCEDURE — 25010000003 CEFAZOLIN IN DEXTROSE 2-4 GM/100ML-% SOLUTION: Performed by: SURGERY

## 2018-07-20 RX ORDER — SODIUM CHLORIDE, SODIUM LACTATE, POTASSIUM CHLORIDE, CALCIUM CHLORIDE 600; 310; 30; 20 MG/100ML; MG/100ML; MG/100ML; MG/100ML
9 INJECTION, SOLUTION INTRAVENOUS CONTINUOUS
Status: DISCONTINUED | OUTPATIENT
Start: 2018-07-20 | End: 2018-07-20 | Stop reason: HOSPADM

## 2018-07-20 RX ORDER — SUCCINYLCHOLINE CHLORIDE 20 MG/ML
INJECTION INTRAMUSCULAR; INTRAVENOUS AS NEEDED
Status: DISCONTINUED | OUTPATIENT
Start: 2018-07-20 | End: 2018-07-20 | Stop reason: SURG

## 2018-07-20 RX ORDER — MIDAZOLAM HYDROCHLORIDE 1 MG/ML
2 INJECTION INTRAMUSCULAR; INTRAVENOUS
Status: DISCONTINUED | OUTPATIENT
Start: 2018-07-20 | End: 2018-07-20 | Stop reason: HOSPADM

## 2018-07-20 RX ORDER — PROMETHAZINE HYDROCHLORIDE 25 MG/1
25 TABLET ORAL ONCE AS NEEDED
Status: COMPLETED | OUTPATIENT
Start: 2018-07-20 | End: 2018-07-20

## 2018-07-20 RX ORDER — PROMETHAZINE HYDROCHLORIDE 25 MG/ML
6.25 INJECTION, SOLUTION INTRAMUSCULAR; INTRAVENOUS ONCE AS NEEDED
Status: COMPLETED | OUTPATIENT
Start: 2018-07-20 | End: 2018-07-20

## 2018-07-20 RX ORDER — MIDAZOLAM HYDROCHLORIDE 1 MG/ML
1 INJECTION INTRAMUSCULAR; INTRAVENOUS
Status: DISCONTINUED | OUTPATIENT
Start: 2018-07-20 | End: 2018-07-20 | Stop reason: HOSPADM

## 2018-07-20 RX ORDER — LIDOCAINE HYDROCHLORIDE AND EPINEPHRINE 10; 10 MG/ML; UG/ML
INJECTION, SOLUTION INFILTRATION; PERINEURAL AS NEEDED
Status: DISCONTINUED | OUTPATIENT
Start: 2018-07-20 | End: 2018-07-20 | Stop reason: HOSPADM

## 2018-07-20 RX ORDER — LABETALOL HYDROCHLORIDE 5 MG/ML
5 INJECTION, SOLUTION INTRAVENOUS
Status: DISCONTINUED | OUTPATIENT
Start: 2018-07-20 | End: 2018-07-20 | Stop reason: HOSPADM

## 2018-07-20 RX ORDER — ONDANSETRON 2 MG/ML
4 INJECTION INTRAMUSCULAR; INTRAVENOUS ONCE AS NEEDED
Status: COMPLETED | OUTPATIENT
Start: 2018-07-20 | End: 2018-07-20

## 2018-07-20 RX ORDER — MAGNESIUM HYDROXIDE 1200 MG/15ML
LIQUID ORAL AS NEEDED
Status: DISCONTINUED | OUTPATIENT
Start: 2018-07-20 | End: 2018-07-20 | Stop reason: HOSPADM

## 2018-07-20 RX ORDER — HYDROCODONE BITARTRATE AND ACETAMINOPHEN 7.5; 325 MG/1; MG/1
1 TABLET ORAL ONCE AS NEEDED
Status: COMPLETED | OUTPATIENT
Start: 2018-07-20 | End: 2018-07-20

## 2018-07-20 RX ORDER — PROPOFOL 10 MG/ML
VIAL (ML) INTRAVENOUS AS NEEDED
Status: DISCONTINUED | OUTPATIENT
Start: 2018-07-20 | End: 2018-07-20 | Stop reason: SURG

## 2018-07-20 RX ORDER — LIDOCAINE HYDROCHLORIDE 10 MG/ML
0.5 INJECTION, SOLUTION EPIDURAL; INFILTRATION; INTRACAUDAL; PERINEURAL ONCE AS NEEDED
Status: DISCONTINUED | OUTPATIENT
Start: 2018-07-20 | End: 2018-07-20 | Stop reason: HOSPADM

## 2018-07-20 RX ORDER — FENTANYL CITRATE 50 UG/ML
50 INJECTION, SOLUTION INTRAMUSCULAR; INTRAVENOUS
Status: DISCONTINUED | OUTPATIENT
Start: 2018-07-20 | End: 2018-07-20 | Stop reason: HOSPADM

## 2018-07-20 RX ORDER — OXYCODONE HYDROCHLORIDE AND ACETAMINOPHEN 5; 325 MG/1; MG/1
1-2 TABLET ORAL EVERY 4 HOURS PRN
Qty: 20 TABLET | Refills: 0 | Status: SHIPPED | OUTPATIENT
Start: 2018-07-20 | End: 2018-09-21

## 2018-07-20 RX ORDER — BUPIVACAINE HYDROCHLORIDE AND EPINEPHRINE 2.5; 5 MG/ML; UG/ML
INJECTION, SOLUTION INFILTRATION; PERINEURAL AS NEEDED
Status: DISCONTINUED | OUTPATIENT
Start: 2018-07-20 | End: 2018-07-20 | Stop reason: HOSPADM

## 2018-07-20 RX ORDER — DEXAMETHASONE SODIUM PHOSPHATE 4 MG/ML
INJECTION, SOLUTION INTRA-ARTICULAR; INTRALESIONAL; INTRAMUSCULAR; INTRAVENOUS; SOFT TISSUE AS NEEDED
Status: DISCONTINUED | OUTPATIENT
Start: 2018-07-20 | End: 2018-07-20 | Stop reason: SURG

## 2018-07-20 RX ORDER — HYDROMORPHONE HYDROCHLORIDE 1 MG/ML
0.5 INJECTION, SOLUTION INTRAMUSCULAR; INTRAVENOUS; SUBCUTANEOUS
Status: DISCONTINUED | OUTPATIENT
Start: 2018-07-20 | End: 2018-07-20 | Stop reason: HOSPADM

## 2018-07-20 RX ORDER — FENTANYL CITRATE 50 UG/ML
100 INJECTION, SOLUTION INTRAMUSCULAR; INTRAVENOUS
Status: DISCONTINUED | OUTPATIENT
Start: 2018-07-20 | End: 2018-07-20 | Stop reason: HOSPADM

## 2018-07-20 RX ORDER — OXYCODONE HYDROCHLORIDE AND ACETAMINOPHEN 5; 325 MG/1; MG/1
2 TABLET ORAL ONCE AS NEEDED
Status: DISCONTINUED | OUTPATIENT
Start: 2018-07-20 | End: 2018-07-20 | Stop reason: HOSPADM

## 2018-07-20 RX ORDER — EPHEDRINE SULFATE 50 MG/ML
5 INJECTION, SOLUTION INTRAVENOUS ONCE AS NEEDED
Status: DISCONTINUED | OUTPATIENT
Start: 2018-07-20 | End: 2018-07-20 | Stop reason: HOSPADM

## 2018-07-20 RX ORDER — LIDOCAINE HYDROCHLORIDE 20 MG/ML
INJECTION, SOLUTION INFILTRATION; PERINEURAL AS NEEDED
Status: DISCONTINUED | OUTPATIENT
Start: 2018-07-20 | End: 2018-07-20 | Stop reason: SURG

## 2018-07-20 RX ORDER — CEFAZOLIN SODIUM 2 G/100ML
2 INJECTION, SOLUTION INTRAVENOUS ONCE
Status: COMPLETED | OUTPATIENT
Start: 2018-07-20 | End: 2018-07-20

## 2018-07-20 RX ORDER — DIPHENHYDRAMINE HYDROCHLORIDE 50 MG/ML
6.25 INJECTION INTRAMUSCULAR; INTRAVENOUS
Status: DISCONTINUED | OUTPATIENT
Start: 2018-07-20 | End: 2018-07-20 | Stop reason: HOSPADM

## 2018-07-20 RX ORDER — FLUMAZENIL 0.1 MG/ML
0.2 INJECTION INTRAVENOUS AS NEEDED
Status: DISCONTINUED | OUTPATIENT
Start: 2018-07-20 | End: 2018-07-20 | Stop reason: HOSPADM

## 2018-07-20 RX ORDER — SODIUM CHLORIDE 0.9 % (FLUSH) 0.9 %
1-10 SYRINGE (ML) INJECTION AS NEEDED
Status: DISCONTINUED | OUTPATIENT
Start: 2018-07-20 | End: 2018-07-20 | Stop reason: HOSPADM

## 2018-07-20 RX ORDER — PROPOFOL 10 MG/ML
VIAL (ML) INTRAVENOUS CONTINUOUS PRN
Status: DISCONTINUED | OUTPATIENT
Start: 2018-07-20 | End: 2018-07-20 | Stop reason: SURG

## 2018-07-20 RX ORDER — ONDANSETRON 2 MG/ML
INJECTION INTRAMUSCULAR; INTRAVENOUS AS NEEDED
Status: DISCONTINUED | OUTPATIENT
Start: 2018-07-20 | End: 2018-07-20 | Stop reason: SURG

## 2018-07-20 RX ORDER — FAMOTIDINE 10 MG/ML
20 INJECTION, SOLUTION INTRAVENOUS ONCE
Status: COMPLETED | OUTPATIENT
Start: 2018-07-20 | End: 2018-07-20

## 2018-07-20 RX ORDER — PROMETHAZINE HYDROCHLORIDE 25 MG/1
25 SUPPOSITORY RECTAL ONCE AS NEEDED
Status: COMPLETED | OUTPATIENT
Start: 2018-07-20 | End: 2018-07-20

## 2018-07-20 RX ADMIN — PROPOFOL 200 MG: 10 INJECTION, EMULSION INTRAVENOUS at 12:04

## 2018-07-20 RX ADMIN — FAMOTIDINE 20 MG: 10 INJECTION, SOLUTION INTRAVENOUS at 11:14

## 2018-07-20 RX ADMIN — SODIUM CHLORIDE, POTASSIUM CHLORIDE, SODIUM LACTATE AND CALCIUM CHLORIDE 9 ML/HR: 600; 310; 30; 20 INJECTION, SOLUTION INTRAVENOUS at 11:04

## 2018-07-20 RX ADMIN — PROPOFOL 50 MG: 10 INJECTION, EMULSION INTRAVENOUS at 11:57

## 2018-07-20 RX ADMIN — SUCCINYLCHOLINE CHLORIDE 120 MG: 20 INJECTION, SOLUTION INTRAMUSCULAR; INTRAVENOUS; PARENTERAL at 12:04

## 2018-07-20 RX ADMIN — CEFAZOLIN SODIUM 2 G: 2 INJECTION, SOLUTION INTRAVENOUS at 11:57

## 2018-07-20 RX ADMIN — HYDROCODONE BITARTRATE AND ACETAMINOPHEN 1 TABLET: 7.5; 325 TABLET ORAL at 14:22

## 2018-07-20 RX ADMIN — PROMETHAZINE HYDROCHLORIDE 6.25 MG: 25 INJECTION INTRAMUSCULAR; INTRAVENOUS at 12:42

## 2018-07-20 RX ADMIN — PROPOFOL 120 MCG/KG/MIN: 10 INJECTION, EMULSION INTRAVENOUS at 11:57

## 2018-07-20 RX ADMIN — ONDANSETRON 4 MG: 2 INJECTION INTRAMUSCULAR; INTRAVENOUS at 12:39

## 2018-07-20 RX ADMIN — SODIUM CHLORIDE, POTASSIUM CHLORIDE, SODIUM LACTATE AND CALCIUM CHLORIDE 9 ML/HR: 600; 310; 30; 20 INJECTION, SOLUTION INTRAVENOUS at 12:56

## 2018-07-20 RX ADMIN — DEXAMETHASONE SODIUM PHOSPHATE 8 MG: 4 INJECTION, SOLUTION INTRAMUSCULAR; INTRAVENOUS at 12:21

## 2018-07-20 RX ADMIN — ONDANSETRON 4 MG: 2 INJECTION INTRAMUSCULAR; INTRAVENOUS at 12:21

## 2018-07-20 RX ADMIN — MIDAZOLAM 2 MG: 1 INJECTION INTRAMUSCULAR; INTRAVENOUS at 11:14

## 2018-07-20 RX ADMIN — LIDOCAINE HYDROCHLORIDE 80 MG: 20 INJECTION, SOLUTION INFILTRATION; PERINEURAL at 11:57

## 2018-07-20 NOTE — ANESTHESIA POSTPROCEDURE EVALUATION
Patient: Whitney Schofield    Procedure Summary     Date:  07/20/18 Room / Location:   [facility]  [facility]    Anesthesia Start:  1154 Anesthesia Stop:  1240    Procedure:  EXCISION MASS TRUNK, RUQ ABD WALL LIPOMA (Right ) Diagnosis:       Lipoma of abdominal wall      (Lipoma of abdominal wall [D17.1])    Surgeon:  Eduar Marx MD Provider:  Jaime Gonzalez MD    Anesthesia Type:  MAC ASA Status:  4          Anesthesia Type: MAC  Last vitals  BP   137/71 (07/20/18 1345)   Temp   36.4 °C (97.6 °F) (07/20/18 1330)   Pulse   68 (07/20/18 1345)   Resp   16 (07/20/18 1345)     SpO2   97 % (07/20/18 1330)     Post Anesthesia Care and Evaluation    Patient location during evaluation: bedside  Patient participation: complete - patient participated  Level of consciousness: awake  Pain score: 2  Pain management: adequate  Airway patency: patent  Anesthetic complications: No anesthetic complications  PONV Status: none  Cardiovascular status: acceptable  Respiratory status: acceptable  Hydration status: acceptable    Comments: /71   Pulse 68   Temp 36.4 °C (97.6 °F) (Temporal Artery )   Resp 16   SpO2 97%

## 2018-07-20 NOTE — H&P
H&P    PADMA BRENNER  YOB: 1965  DATE OF SERVICE:  07/20/2018        CONSULTING PHYSICIAN:  Eduar Marx MD  REQUESTING PHYSICIAN:  Dmitri Schulte MD    REASON FOR CONSULTATION:  Abdominal wall mass.    HISTORY:  The patient is an otherwise stable appearing 52-year-old young lady who comes in the office today to discuss a mass that has developed of late on her right upper quadrant abdomen.  Apparently it has not only developed, but it is getting larger and uncomfortable.  She has no history of similar problems in the past.  She denies any trauma to the area.  It has never been red or infected.  She denies any fever, chills or other constitutional complaints.  PAST MEDICAL HISTORY:  1.  Thyroid cancer/Thyroid disease.  2.  Heart disease/heart failure, pacemaker.  3.  Rheumatoid arthritis.  4.  Celiac disease.  5.  Chronic pain.  6.  Cholecystectomy.  7.  Thyroidectomy.    SOCIAL HISTORY:  The patient is , works as an .  Does not drink or smoke.    FAMILY HISTORY:  Noncontributory to current condition.    REVIEW OF SYSTEMS:  A 12-point review of systems reviewed, documented, reveals some arthritis complaints, occasional dizziness, urinary frequency, some shortness of breath, multiple nonspecific GI complaints and some palpitations.    PHYSICAL EXAMINATION:  GENERAL:  A healthy-appearing, although mildly overweight young lady, no acute distress, alert, oriented and afebrile.  HEENT:  Sclerae nonicteric.  NECK:  Supple without thyromegaly.  CHEST:  Clear with good respiratory effort bilaterally.  HEART:  Regular without murmur.  ABDOMEN:  Soft with a palpable egg size mass over her right upper quadrant.  EXTREMITIES:  Without edema.  SKIN:  Negative.  RECTAL:  Deferred.    DIAGNOSTIC STUDIES:  Not contributory.    IMPRESSION:  Symptomatic enlarging lipoma of the right upper quadrant.    PLAN:  I recommended removal of this in a controlled setting and the risks and benefits  were discussed including but not limited to bleeding, infection, error in diagnosis, incomplete symptom relief and recurrence.  She is agreeable.    Letter to Dmitri Schulte MD.          ABY Diaz: farhana/vss

## 2018-07-20 NOTE — OP NOTE
July 20, 2018    Whitney Schofield  [id number]    PROCEDURE: Excision 5.5 cm deep right anterior flank subcutaneous mass with layered closure.    PREOPERATIVE DIAGNOSIS:  Lipoma of abdominal wall [D17.1].    POSTOPERATIVE DIAGNOSIS: Same, pathology pending.    SURGEON:  Eduar Marx M.D.    ASSISTANT:  None.    ANESTHESIA:  MAC converted to general with local.    ESTIMATED BLOOD LOSS: minimal    SPECIMENS:    Order Name Source Comment Collection Info Order Time   TISSUE PATHOLOGY EXAM Abdominal Wall  Collected By: Eduar Marx MD 7/20/2018 12:14 PM       INDICATIONS:  Patient is a 52 year old young lady referred to me with an enlarging and symptomatic right anterior flank/right upper quadrant subcutaneous nodule.  She presents today for removal of the same after having the procedure, risks, benefits and alternatives discussed with her in the office including but not limited to bleeding, infection, incomplete symptom relief, recurrence.    PROCEDURE:  Patient was brought to the operating room and placed supine.  IV sedation was administered but she had some issues with nausea and emesis so she was converted to general endotracheal anesthesia.  Right anterior flank abnormality was identified and confirmed and then prepping and draping was performed.  Local anesthetic field block was injected.  Transverse incision 6 cm in length was made directly over the abnormality and I dissected down to what turned out to be a deep mass below the subcutaneous fat involving the musculature of the flank.  I had to dissect the mass away from and out of the musculature sharply and with cautery.  The mass had a fatty appearance but was much paler and even wider than I would've expected for typical lipoma.  I removed it for the most part in 1 piece with just a few smaller pieces being removed after the fact.  The wound was cauterized for hemostasis.  More local was injected.  The mass measured 5.5 cm in maximal dimension.  The  wound was closed in layers with 2-0 Vicryl for the deep layer and 4-0 Vicryl for subcuticular layer.  Benzoin stitches should gauze and Tegaderm were applied.  She'll be sent to recovery and then home later today.      Eduar Marx M.D.

## 2018-07-20 NOTE — ANESTHESIA PROCEDURE NOTES
Airway  Urgency: elective    Airway not difficult    General Information and Staff    Patient location during procedure: OR  Anesthesiologist: KIMBERLEE BOWER  CRNA: KALE MCKINNON    Indications and Patient Condition  Indications for airway management: airway protection    Preoxygenated: yes  Mask difficulty assessment: 2 - vent by mask + OA or adjuvant +/- NMBA    Final Airway Details  Final airway type: endotracheal airway      Successful airway: ETT  Cuffed: yes   Successful intubation technique: direct laryngoscopy and RSI  Facilitating devices/methods: cricoid pressure, anterior pressure/BURP and Bougie  Endotracheal tube insertion site: oral  Blade: Betts  Blade size: #2  ETT size: 7.0 mm  Cormack-Lehane Classification: grade III - view of epiglottis only  Placement verified by: chest auscultation and capnometry   Cuff volume (mL): 8  Number of attempts at approach: 1    Additional Comments  PreO2 with 100% O2;  FeO2 >85%;  Intubated with bougie and ETT styletted over with no difficultly after eyes taped; Appears atraumatic;  Lips and teeth intact as preop condition;  Airway secured. Connected to ventilator.

## 2018-07-20 NOTE — ANESTHESIA PREPROCEDURE EVALUATION
Anesthesia Evaluation     Patient summary reviewed and Nursing notes reviewed   NPO Solid Status: > 8 hours             Airway   Mallampati: II  TM distance: <3 FB  Neck ROM: limited  Possible difficult intubation  Dental - normal exam     Pulmonary - normal exam   (+) asthma, sleep apnea,   Cardiovascular - normal exam    (+) pacemaker pacemaker,     ROS comment: ASD closure 2010    Neuro/Psych- negative ROS  GI/Hepatic/Renal/Endo - negative ROS     Musculoskeletal (-) negative ROS    Abdominal  - normal exam   Substance History - negative use     OB/GYN negative ob/gyn ROS         Other                        Anesthesia Plan    ASA 4     MAC     intravenous induction   Anesthetic plan and risks discussed with patient.    Plan discussed with CRNA.

## 2018-07-23 LAB
CYTO UR: NORMAL
LAB AP CASE REPORT: NORMAL
PATH REPORT.FINAL DX SPEC: NORMAL
PATH REPORT.GROSS SPEC: NORMAL

## 2018-08-09 PROBLEM — K20.90 ESOPHAGITIS: Status: ACTIVE | Noted: 2018-08-09

## 2018-09-21 ENCOUNTER — OFFICE VISIT (OUTPATIENT)
Dept: CARDIOLOGY | Facility: CLINIC | Age: 53
End: 2018-09-21

## 2018-09-21 ENCOUNTER — CLINICAL SUPPORT NO REQUIREMENTS (OUTPATIENT)
Dept: CARDIOLOGY | Facility: CLINIC | Age: 53
End: 2018-09-21

## 2018-09-21 VITALS
HEART RATE: 69 BPM | OXYGEN SATURATION: 96 % | SYSTOLIC BLOOD PRESSURE: 132 MMHG | DIASTOLIC BLOOD PRESSURE: 78 MMHG | WEIGHT: 209 LBS | HEIGHT: 65 IN | BODY MASS INDEX: 34.82 KG/M2

## 2018-09-21 DIAGNOSIS — I44.2 THIRD DEGREE AV BLOCK (HCC): Primary | ICD-10-CM

## 2018-09-21 DIAGNOSIS — I50.22 CHRONIC SYSTOLIC CONGESTIVE HEART FAILURE (HCC): ICD-10-CM

## 2018-09-21 DIAGNOSIS — I48.0 PAROXYSMAL ATRIAL FIBRILLATION (HCC): Primary | ICD-10-CM

## 2018-09-21 DIAGNOSIS — R60.0 PEDAL EDEMA: ICD-10-CM

## 2018-09-21 DIAGNOSIS — R06.09 DYSPNEA ON EXERTION: ICD-10-CM

## 2018-09-21 DIAGNOSIS — I25.119 ATHEROSCLEROSIS OF CORONARY ARTERY OF NATIVE HEART WITH ANGINA PECTORIS, UNSPECIFIED VESSEL OR LESION TYPE (HCC): ICD-10-CM

## 2018-09-21 PROCEDURE — 93281 PM DEVICE PROGR EVAL MULTI: CPT | Performed by: INTERNAL MEDICINE

## 2018-09-21 PROCEDURE — 99214 OFFICE O/P EST MOD 30 MIN: CPT | Performed by: NURSE PRACTITIONER

## 2018-09-21 NOTE — PROGRESS NOTES
Patient Name: Whitney Schofield  :1965  Age: 52 y.o.  Primary Cardiologist: Christiano Marques MD  Encounter Provider:  RAHAT Stephen      Chief Complaint:   Chief Complaint   Patient presents with   • Follow-up         HPI    Patient is a 52-year-old female with a history of atrial septal defect which was repaired in , permanent pacemaker placement secondary to complete AV block and accelerated junctional escape rhythm, Hashimoto's thyroiditis status post partial thyroidectomy in , nonischemic cardiomyopathy who presents today for follow-up.  Patient is new to me but I have reviewed her medical record.  Patient states that since her last visit with Dr. Marques she has had increasing shortness of breath with exertion.  She also complains of increased fatigue as well as increased pedal edema.  Patient states that she had her pacemaker checked today.  She reports that they did make some adjustments.  Patient states that her blood pressure at home has been running 110s/60s.  Patient denies any chest pains, palpitations, lightheadedness or syncopal events.  She reports that she had an outpatient CT scan of the chest in  and reports that there were some abnormalities that her physician recommended that we review.  She presents today with those results in hand.  It is noted that there was atherosclerotic calcifications of the aortic arch and coronary arteries.  Pacemaker interrogation report from today showed that there was one episode lasting 2 minutes and 51 seconds on 2018 of atrial fibrillation.  The left ventricular amplitude was decreased to 2.5 V otherwise there were no other permanent changes made.          The following portions of the patient's history were reviewed and updated as appropriate: allergies, current medications, past family history, past medical history, past social history, past surgical history and problem list.    Current Outpatient Prescriptions on  "File Prior to Visit   Medication Sig Dispense Refill   • carvedilol (COREG) 3.125 MG tablet Take 3.125 mg by mouth 2 (Two) Times a Day As Needed. DEPENDING ON BP.     • Ferrous Sulfate Dried 200 (65 FE) MG tablet Take 200 mg by mouth.     • folic acid (FOLVITE) 1 MG tablet Take 1 mg by mouth daily.     • HYDROcodone-acetaminophen (NORCO)  MG per tablet Take 1 tablet by mouth Every 6 (Six) Hours As Needed.     • levothyroxine (SYNTHROID, LEVOTHROID) 300 MCG tablet Take 250 mcg by mouth Daily. Takes 2 daily  0   • losartan (COZAAR) 25 MG tablet Take 25 mg by mouth Daily As Needed.     • methotrexate 2.5 MG tablet Take 20 mg by mouth 1 (one) time per week.     • omeprazole (priLOSEC) 40 MG capsule Take 40 mg by mouth Daily.     • potassium chloride (MICRO-K) 10 MEQ CR capsule Take 10 mEq by mouth As Needed.     • traZODone (DESYREL) 50 MG tablet Take 50 mg by mouth Every Night.     • vitamin D (ERGOCALCIFEROL) 04697 units capsule capsule Take 50,000 Units by mouth 1 (One) Time Per Week. SUNDAY     • [DISCONTINUED] oxyCODONE-acetaminophen (PERCOCET) 5-325 MG per tablet Take 1-2 tablets by mouth Every 4 (Four) Hours As Needed (1-2 tabs prn pain, Max 10 tabs/day) for up to 20 doses. 20 tablet 0     No current facility-administered medications on file prior to visit.          Review of Systems   Constitution: Positive for malaise/fatigue.   Cardiovascular: Positive for leg swelling. Negative for chest pain and palpitations.   Respiratory: Positive for shortness of breath.    All other systems reviewed and are negative.      OBJECTIVE:   Vital Signs  Vitals:    09/21/18 1504   BP: 132/78   Pulse: 69   SpO2: 96%     Estimated body mass index is 34.78 kg/m² as calculated from the following:    Height as of this encounter: 165.1 cm (65\").    Weight as of this encounter: 94.8 kg (209 lb).    Physical Exam   Constitutional: She is oriented to person, place, and time. Vital signs are normal. She appears well-developed and " well-nourished. She is active.   Eyes: Conjunctivae are normal.   Cardiovascular: Normal rate and regular rhythm.    Murmur heard.   Harsh midsystolic murmur is present with a grade of 3/6  at the upper right sternal border, lower right sternal border  2+ bilateral pitting edema   Pulmonary/Chest: Breath sounds normal.   Abdominal: Normal appearance.   Neurological: She is alert and oriented to person, place, and time. GCS eye subscore is 4. GCS verbal subscore is 5. GCS motor subscore is 6.   Skin: Skin is warm, dry and intact.   Psychiatric: She has a normal mood and affect. Her speech is normal and behavior is normal. Judgment and thought content normal. Cognition and memory are normal.       Procedures    Cardiac Cath:  March 26, 2010: This revealed normal coronary arteries with a Qp: Qs of 2.3 consistent with a large shunt    July 20, 2011: No evidence of restriction or constriction although she did have pulmonary hypertension with a mean pulmonary artery pressure of 30 mg mercury    April 13, 2015, right heart catheterization: Minimally elevated pulmonary artery pressure      Cardiac Echo:  September 6, 2017:  EF 52%, left ventricular diastolic dysfunction, right ventricular dilatation      ASSESSMENT:      Diagnosis Plan   1. Paroxysmal atrial fibrillation (CMS/HCC)     2. Chronic systolic congestive heart failure (CMS/HCC)  Adult Stress Echo W/ Cont or Stress Agent if Necessary Per Protocol   3. Pedal edema  Adult Stress Echo W/ Cont or Stress Agent if Necessary Per Protocol   4. Dyspnea on exertion  Adult Stress Echo W/ Cont or Stress Agent if Necessary Per Protocol   5. Atherosclerosis of coronary artery of native heart with angina pectoris, unspecified vessel or lesion type (CMS/HCC)  Adult Stress Echo W/ Cont or Stress Agent if Necessary Per Protocol         PLAN OF CARE:     1.  Paroxysmal atrial fibrillation: Patient's pacemaker interrogation today showed a 2 minute and 51 second episode of paroxysmal  atrial fibrillation.  There was only one episode on the 6 month pacemaker interrogation.  The patient's [id number] score is 2.  Patient denied discussed at length the addition of anticoagulation.  Since the patient only had one episode that was less than 3 minutes within a 6 month period at this time we will defer starting anticoagulation.  Patient will have her pacemaker interrogated again in 1-2 weeks to see if she has had any additional episodes.  If patient does continue to have episodes of atrial fibrillation anticoagulation should be considered to reduce thrombo-embolic events.  Patient denies any heart palpitations.    2.  Chronic systolic congestive heart failure: Patient has had increased dyspnea on exertion, fatigue, pedal edema.  Her last echocardiogram was one year ago.  Since she is having new symptoms will repeat that we'll order a stress echocardiogram.  (Please see below)    3.  Pedal edema: Patient reports increased pedal edema it is noted to be 2+ pitting on today's exam.  Will order a stress echocardiogram (please see below)    4.  Dyspnea on exertion: Patient reports increased dyspnea on exertion over the past 2-3 months.  She reports that she does have chronic shortness of breath however she feels over the past 2-3 months that his increased.  We will order a stress echocardiogram (please see below)    5.  Atherosclerosis of coronary arteries and on CT imaging: Patient with new symptoms of dyspnea on exertion as well as pedal edema with a history of congestive heart failure.  Patient will need a repeat echo and since she has any findings of atherosclerosis of coronary arteries on CT imaging will perform a stress echocardiogram.  Patient cannot undergo exercise treadmill stress test secondary to severe rheumatoid arthritis and exercise intolerance.    Atrial Fibrillation and Atrial Flutter  Assessment  • The patient has atrial fibrillation-initial episode  • The patient's CHADS2-VASc score is  2  • A [id number] score of 2 or more is considered a high risk for a thromboembolic event    Subjective - Objective  • The average duration of atrial fibrillation episodes is <48 hours      Heart Failure  Assessment  • Beta blocker prescribed  • Angiotensin receptor blocker (ARB) prescribed  • The most recent ejection fraction is 52%  • Left ventricular function is normal by qualitative assessment  • The left ventricle was last assessed on 9/6/2017    Plan  • The patient has received heart failure education on the following topics: medication instructions and symptom management  • The heart failure care plan was discussed with the patient today including: continuing the current program  •  The patient has been counseled about ICD or CRT-D implantation    Subjective/Objective  • The patient reports dyspnea  • Physical exam findings positive for peripheral edema.         Thank you for allowing me to participate in the care of your patient,      Sincerely,   RAHAT Stephen  [facility]  09/21/18  4:24 PM

## 2018-10-03 ENCOUNTER — CLINICAL SUPPORT NO REQUIREMENTS (OUTPATIENT)
Dept: CARDIOLOGY | Facility: CLINIC | Age: 53
End: 2018-10-03

## 2018-10-03 ENCOUNTER — HOSPITAL ENCOUNTER (OUTPATIENT)
Dept: CARDIOLOGY | Facility: HOSPITAL | Age: 53
Discharge: HOME | End: 2018-10-03
Admitting: NURSE PRACTITIONER

## 2018-10-03 VITALS
HEIGHT: 65 IN | HEART RATE: 96 BPM | WEIGHT: 209 LBS | SYSTOLIC BLOOD PRESSURE: 128 MMHG | BODY MASS INDEX: 34.82 KG/M2 | DIASTOLIC BLOOD PRESSURE: 74 MMHG

## 2018-10-03 DIAGNOSIS — R60.0 PEDAL EDEMA: ICD-10-CM

## 2018-10-03 DIAGNOSIS — I50.22 CHRONIC SYSTOLIC CONGESTIVE HEART FAILURE (HCC): Primary | ICD-10-CM

## 2018-10-03 DIAGNOSIS — R06.09 DYSPNEA ON EXERTION: ICD-10-CM

## 2018-10-03 DIAGNOSIS — I50.22 CHRONIC SYSTOLIC CONGESTIVE HEART FAILURE (HCC): ICD-10-CM

## 2018-10-03 LAB
BH CV ECHO MEAS - ACS: 1.6 CM
BH CV ECHO MEAS - AO MAX PG: 5.5 MMHG
BH CV ECHO MEAS - AO ROOT AREA (BSA CORRECTED): 1.5
BH CV ECHO MEAS - AO ROOT AREA: 7.2 CM^2
BH CV ECHO MEAS - AO ROOT DIAM: 3 CM
BH CV ECHO MEAS - AO V2 MAX: 117.3 CM/SEC
BH CV ECHO MEAS - BSA(HAYCOCK): 2.1 M^2
BH CV ECHO MEAS - BSA: 2 M^2
BH CV ECHO MEAS - BZI_BMI: 34.8 KILOGRAMS/M^2
BH CV ECHO MEAS - BZI_METRIC_HEIGHT: 165.1 CM
BH CV ECHO MEAS - BZI_METRIC_WEIGHT: 94.8 KG
BH CV ECHO MEAS - EDV(MOD-SP4): 88 ML
BH CV ECHO MEAS - EDV(TEICH): 120.4 ML
BH CV ECHO MEAS - EF(CUBED): 70.8 %
BH CV ECHO MEAS - EF(MOD-BP): 52 %
BH CV ECHO MEAS - EF(MOD-SP4): 71.6 %
BH CV ECHO MEAS - EF(TEICH): 62.1 %
BH CV ECHO MEAS - ESV(MOD-SP4): 25 ML
BH CV ECHO MEAS - ESV(TEICH): 45.6 ML
BH CV ECHO MEAS - IVS/LVPW: 0.86
BH CV ECHO MEAS - IVSD: 1 CM
BH CV ECHO MEAS - LAT PEAK E' VEL: 7 CM/SEC
BH CV ECHO MEAS - LV DIASTOLIC VOL/BSA (35-75): 43.7 ML/M^2
BH CV ECHO MEAS - LV MASS(C)D: 217.4 GRAMS
BH CV ECHO MEAS - LV MASS(C)DI: 107.9 GRAMS/M^2
BH CV ECHO MEAS - LV SYSTOLIC VOL/BSA (12-30): 12.4 ML/M^2
BH CV ECHO MEAS - LVIDD: 5 CM
BH CV ECHO MEAS - LVIDS: 3.3 CM
BH CV ECHO MEAS - LVLD AP4: 7.2 CM
BH CV ECHO MEAS - LVLS AP4: 5.9 CM
BH CV ECHO MEAS - LVPWD: 1.2 CM
BH CV ECHO MEAS - MED PEAK E' VEL: 8 CM/SEC
BH CV ECHO MEAS - MV A DUR: 0.11 SEC
BH CV ECHO MEAS - MV A MAX VEL: 82 CM/SEC
BH CV ECHO MEAS - MV DEC SLOPE: 235.1 CM/SEC^2
BH CV ECHO MEAS - MV DEC TIME: 0.21 SEC
BH CV ECHO MEAS - MV E MAX VEL: 47.5 CM/SEC
BH CV ECHO MEAS - MV E/A: 0.58
BH CV ECHO MEAS - MV P1/2T MAX VEL: 50.4 CM/SEC
BH CV ECHO MEAS - MV P1/2T: 62.8 MSEC
BH CV ECHO MEAS - MVA P1/2T LCG: 4.4 CM^2
BH CV ECHO MEAS - MVA(P1/2T): 3.5 CM^2
BH CV ECHO MEAS - PULM A REVS DUR: 0.11 SEC
BH CV ECHO MEAS - PULM A REVS VEL: 30.7 CM/SEC
BH CV ECHO MEAS - PULM DIAS VEL: 42.5 CM/SEC
BH CV ECHO MEAS - PULM S/D: 1.2
BH CV ECHO MEAS - PULM SYS VEL: 48.9 CM/SEC
BH CV ECHO MEAS - SI(CUBED): 44.9 ML/M^2
BH CV ECHO MEAS - SI(MOD-SP4): 31.3 ML/M^2
BH CV ECHO MEAS - SI(TEICH): 37.1 ML/M^2
BH CV ECHO MEAS - SV(CUBED): 90.5 ML
BH CV ECHO MEAS - SV(MOD-SP4): 63 ML
BH CV ECHO MEAS - SV(TEICH): 74.8 ML
BH CV ECHO MEAS - TAPSE (>1.6): 1.7 CM2
BH CV ECHO MEASUREMENTS AVERAGE E/E' RATIO: 6.33
BH CV STRESS BP STAGE 1: NORMAL
BH CV STRESS DURATION MIN STAGE 1: 3
BH CV STRESS DURATION MIN STAGE 2: 2
BH CV STRESS DURATION SEC STAGE 1: 0
BH CV STRESS DURATION SEC STAGE 2: 24
BH CV STRESS ECHO POST STRESS EJECTION FRACTION EF: 72 %
BH CV STRESS GRADE STAGE 1: 10
BH CV STRESS GRADE STAGE 2: 12
BH CV STRESS HR STAGE 1: 131
BH CV STRESS HR STAGE 2: 138
BH CV STRESS METS STAGE 1: 5
BH CV STRESS METS STAGE 2: 7.5
BH CV STRESS PROTOCOL 1: NORMAL
BH CV STRESS SPEED STAGE 1: 1.7
BH CV STRESS SPEED STAGE 2: 2.5
BH CV STRESS STAGE 1: 1
BH CV STRESS STAGE 2: 2
BH CV XLRA - RV BASE: 3 CM
BH CV XLRA - TDI S': 11 CM/SEC
LEFT ATRIUM VOLUME INDEX: 14 ML/M2
MAXIMAL PREDICTED HEART RATE: 167 BPM
PERCENT MAX PREDICTED HR: 82.63 %
SINUS: 3.2 CM
STJ: 3 CM
STRESS BASELINE BP: NORMAL MMHG
STRESS BASELINE HR: 96 BPM
STRESS O2 SAT REST: 98 %
STRESS PERCENT HR: 97 %
STRESS POST ESTIMATED WORKLOAD: 6 METS
STRESS POST EXERCISE DUR MIN: 5 MIN
STRESS POST EXERCISE DUR SEC: 24 SEC
STRESS POST PEAK BP: NORMAL MMHG
STRESS POST PEAK HR: 138 BPM
STRESS TARGET HR: 142 BPM

## 2018-10-03 PROCEDURE — 93350 STRESS TTE ONLY: CPT | Performed by: INTERNAL MEDICINE

## 2018-10-03 PROCEDURE — 93352 ADMIN ECG CONTRAST AGENT: CPT | Performed by: INTERNAL MEDICINE

## 2018-10-03 PROCEDURE — 93018 CV STRESS TEST I&R ONLY: CPT | Performed by: INTERNAL MEDICINE

## 2018-10-03 PROCEDURE — 93288 INTERROG EVL PM/LDLS PM IP: CPT | Performed by: INTERNAL MEDICINE

## 2018-10-03 PROCEDURE — 25010000002 PERFLUTREN (DEFINITY) 8.476 MG IN SODIUM CHLORIDE 0.9 % 10 ML INJECTION: Performed by: NURSE PRACTITIONER

## 2018-10-03 PROCEDURE — 93320 DOPPLER ECHO COMPLETE: CPT | Performed by: INTERNAL MEDICINE

## 2018-10-03 PROCEDURE — 93350 STRESS TTE ONLY: CPT

## 2018-10-03 PROCEDURE — 93016 CV STRESS TEST SUPVJ ONLY: CPT | Performed by: INTERNAL MEDICINE

## 2018-10-03 PROCEDURE — 93320 DOPPLER ECHO COMPLETE: CPT

## 2018-10-03 PROCEDURE — 93325 DOPPLER ECHO COLOR FLOW MAPG: CPT | Performed by: INTERNAL MEDICINE

## 2018-10-03 PROCEDURE — 93017 CV STRESS TEST TRACING ONLY: CPT

## 2018-10-03 PROCEDURE — 93325 DOPPLER ECHO COLOR FLOW MAPG: CPT

## 2018-10-03 RX ADMIN — PERFLUTREN 3 ML: 6.52 INJECTION, SUSPENSION INTRAVENOUS at 13:45

## 2018-10-04 ENCOUNTER — TELEPHONE (OUTPATIENT)
Dept: CARDIOLOGY | Facility: CLINIC | Age: 53
End: 2018-10-04

## 2018-10-04 DIAGNOSIS — I25.10 CORONARY ARTERY CALCIFICATION SEEN ON CT SCAN: Primary | ICD-10-CM

## 2018-10-04 DIAGNOSIS — R06.02 SHORTNESS OF BREATH: ICD-10-CM

## 2018-10-04 NOTE — PROGRESS NOTES
I did this because she had atherosclerosis on CT scan.  Should she have a stress PET or do think she needs a?  Or [facility] question

## 2018-10-04 NOTE — TELEPHONE ENCOUNTER
10/04/18  11:17 AM    Informed patient of results of stress echo.  I informed her that we do need to do a Lexiscan stress test.  This has been scheduled.  She verbalized understanding and will call with any new symptoms or concerns.    RAHAT Bell  [facility]

## 2018-10-10 ENCOUNTER — HOSPITAL ENCOUNTER (OUTPATIENT)
Dept: CARDIOLOGY | Facility: HOSPITAL | Age: 53
Discharge: HOME | End: 2018-10-10
Attending: INTERNAL MEDICINE | Admitting: INTERNAL MEDICINE

## 2018-10-10 ENCOUNTER — TELEPHONE (OUTPATIENT)
Dept: FAMILY MEDICINE CLINIC | Facility: CLINIC | Age: 53
End: 2018-10-10

## 2018-10-10 VITALS — HEIGHT: 65 IN | BODY MASS INDEX: 34.82 KG/M2 | WEIGHT: 209 LBS

## 2018-10-10 DIAGNOSIS — E89.0 H/O TOTAL THYROIDECTOMY: Primary | ICD-10-CM

## 2018-10-10 DIAGNOSIS — R06.02 SHORTNESS OF BREATH: ICD-10-CM

## 2018-10-10 DIAGNOSIS — I25.10 CORONARY ARTERY CALCIFICATION SEEN ON CT SCAN: ICD-10-CM

## 2018-10-10 LAB
BH CV NUCLEAR PRIOR STUDY: 3
BH CV STRESS BP STAGE 1: NORMAL
BH CV STRESS COMMENTS STAGE 1: NORMAL
BH CV STRESS DOSE REGADENOSON STAGE 1: 0.4
BH CV STRESS DURATION MIN STAGE 1: 0
BH CV STRESS DURATION SEC STAGE 1: 10
BH CV STRESS HR STAGE 1: 111
BH CV STRESS PROTOCOL 1: NORMAL
BH CV STRESS RECOVERY BP: NORMAL MMHG
BH CV STRESS RECOVERY HR: 96 BPM
BH CV STRESS STAGE 1: 1
LV EF NUC BP: 59 %
MAXIMAL PREDICTED HEART RATE: 167 BPM
PERCENT MAX PREDICTED HR: 66.47 %
STRESS BASELINE BP: NORMAL MMHG
STRESS BASELINE HR: 72 BPM
STRESS PERCENT HR: 78 %
STRESS POST EXERCISE DUR SEC: 10 SEC
STRESS POST PEAK BP: NORMAL MMHG
STRESS POST PEAK HR: 111 BPM
STRESS TARGET HR: 142 BPM

## 2018-10-10 PROCEDURE — A9502 TC99M TETROFOSMIN: HCPCS | Performed by: INTERNAL MEDICINE

## 2018-10-10 PROCEDURE — 93017 CV STRESS TEST TRACING ONLY: CPT

## 2018-10-10 PROCEDURE — 78452 HT MUSCLE IMAGE SPECT MULT: CPT

## 2018-10-10 PROCEDURE — 0 TECHNETIUM TETROFOSMIN KIT: Performed by: INTERNAL MEDICINE

## 2018-10-10 PROCEDURE — 93016 CV STRESS TEST SUPVJ ONLY: CPT | Performed by: INTERNAL MEDICINE

## 2018-10-10 PROCEDURE — 25010000002 REGADENOSON 0.4 MG/5ML SOLUTION: Performed by: INTERNAL MEDICINE

## 2018-10-10 PROCEDURE — 93018 CV STRESS TEST I&R ONLY: CPT | Performed by: INTERNAL MEDICINE

## 2018-10-10 PROCEDURE — 78452 HT MUSCLE IMAGE SPECT MULT: CPT | Performed by: INTERNAL MEDICINE

## 2018-10-10 RX ADMIN — REGADENOSON 0.4 MG: 0.08 INJECTION, SOLUTION INTRAVENOUS at 13:55

## 2018-10-10 RX ADMIN — TETROFOSMIN 1 DOSE: 1.38 INJECTION, POWDER, LYOPHILIZED, FOR SOLUTION INTRAVENOUS at 13:55

## 2018-10-10 RX ADMIN — TETROFOSMIN 1 DOSE: 1.38 INJECTION, POWDER, LYOPHILIZED, FOR SOLUTION INTRAVENOUS at 12:45

## 2018-10-11 ENCOUNTER — TELEPHONE (OUTPATIENT)
Dept: CARDIOLOGY | Facility: CLINIC | Age: 53
End: 2018-10-11

## 2018-10-11 NOTE — TELEPHONE ENCOUNTER
10/11/18  Pt called to make follow up appt with Dr OAKES.   Appt was made she will need pacemaker check also.   I let pt know to arrive 30 minutes early for appt for Pacemaker check.   Elmo GILL

## 2018-10-11 NOTE — TELEPHONE ENCOUNTER
Pt has scheduled appt with Dr OAKES on 3/6/19 @3:00pm.   Please place her on pacemaker schedule this day.   She is aware to arrive 30 minute for pacemaker check. Elmo GILL

## 2018-12-03 RX ORDER — AMOXICILLIN 500 MG/1
2000 CAPSULE ORAL SEE ADMIN INSTRUCTIONS
Qty: 4 CAPSULE | Refills: 6 | Status: SHIPPED | OUTPATIENT
Start: 2018-12-03 | End: 2019-03-13 | Stop reason: HOSPADM

## 2019-01-09 ENCOUNTER — TELEPHONE (OUTPATIENT)
Dept: FAMILY MEDICINE CLINIC | Facility: CLINIC | Age: 54
End: 2019-01-09

## 2019-01-09 NOTE — TELEPHONE ENCOUNTER
PT IS BEING DISCHARGED FROM HOSPITAL TODAY AFTER HAVING SURGERY.  HOSPITAL TOLD HER THAT PCP NEEDS TO PUT IN ORDER FOR HOME HEALTH.  SHE KNOWS THAT SHE WILL NEED PHYSICAL THERAPY, BUT SHE DOESN'T KNOW WHAT ELSE SHE WILL NEED FROM THEM    SHE WANTS A CALL BACK AND V/M LEFT IF SHE IS UNABLE TO GET TO THE PHONE

## 2019-01-09 NOTE — TELEPHONE ENCOUNTER
Patient will check with hospital the surgeon who did her surgery should recommend what PT is needed not PCP

## 2019-03-06 ENCOUNTER — OFFICE VISIT (OUTPATIENT)
Dept: CARDIOLOGY | Facility: CLINIC | Age: 54
End: 2019-03-06

## 2019-03-06 ENCOUNTER — CLINICAL SUPPORT NO REQUIREMENTS (OUTPATIENT)
Dept: CARDIOLOGY | Facility: CLINIC | Age: 54
End: 2019-03-06

## 2019-03-06 ENCOUNTER — HOSPITAL ENCOUNTER (OUTPATIENT)
Dept: CT IMAGING | Facility: HOSPITAL | Age: 54
Discharge: HOME | End: 2019-03-06
Admitting: INTERNAL MEDICINE

## 2019-03-06 VITALS
SYSTOLIC BLOOD PRESSURE: 120 MMHG | HEART RATE: 90 BPM | HEIGHT: 65 IN | WEIGHT: 209.4 LBS | BODY MASS INDEX: 34.89 KG/M2 | DIASTOLIC BLOOD PRESSURE: 86 MMHG

## 2019-03-06 DIAGNOSIS — R06.2 WHEEZING: ICD-10-CM

## 2019-03-06 DIAGNOSIS — Z98.890 STATUS POST TRICUSPID VALVE REPAIR: ICD-10-CM

## 2019-03-06 DIAGNOSIS — R09.02 HYPOXIA: ICD-10-CM

## 2019-03-06 DIAGNOSIS — R05.9 COUGH: ICD-10-CM

## 2019-03-06 DIAGNOSIS — Z95.0 PACEMAKER: ICD-10-CM

## 2019-03-06 DIAGNOSIS — R00.0 TACHYCARDIA: ICD-10-CM

## 2019-03-06 DIAGNOSIS — R06.02 SHORTNESS OF BREATH: Primary | ICD-10-CM

## 2019-03-06 DIAGNOSIS — I50.42 CHRONIC COMBINED SYSTOLIC AND DIASTOLIC CONGESTIVE HEART FAILURE (HCC): ICD-10-CM

## 2019-03-06 DIAGNOSIS — I42.9 CARDIOMYOPATHY, UNSPECIFIED TYPE (HCC): Primary | ICD-10-CM

## 2019-03-06 DIAGNOSIS — Z87.74 STATUS POST PATCH CLOSURE OF ASD: ICD-10-CM

## 2019-03-06 DIAGNOSIS — I42.8 NON-ISCHEMIC CARDIOMYOPATHY (HCC): ICD-10-CM

## 2019-03-06 DIAGNOSIS — I44.2 THIRD DEGREE AV BLOCK (HCC): ICD-10-CM

## 2019-03-06 DIAGNOSIS — R06.02 SHORTNESS OF BREATH: ICD-10-CM

## 2019-03-06 LAB — CREAT BLDA-MCNC: 0.7 MG/DL (ref 0.6–1.3)

## 2019-03-06 PROCEDURE — 99214 OFFICE O/P EST MOD 30 MIN: CPT | Performed by: INTERNAL MEDICINE

## 2019-03-06 PROCEDURE — 0 IOPAMIDOL PER 1 ML: Performed by: INTERNAL MEDICINE

## 2019-03-06 PROCEDURE — 82565 ASSAY OF CREATININE: CPT

## 2019-03-06 PROCEDURE — 93281 PM DEVICE PROGR EVAL MULTI: CPT | Performed by: INTERNAL MEDICINE

## 2019-03-06 PROCEDURE — 71275 CT ANGIOGRAPHY CHEST: CPT

## 2019-03-06 RX ADMIN — IOPAMIDOL 95 ML: 755 INJECTION, SOLUTION INTRAVENOUS at 18:17

## 2019-03-07 ENCOUNTER — TELEPHONE (OUTPATIENT)
Dept: FAMILY MEDICINE CLINIC | Facility: CLINIC | Age: 54
End: 2019-03-07

## 2019-03-07 NOTE — NURSING NOTE
Spoke with Dr. Gillette, results given, results called to Dr. Posada, informed patient, ok'd to go home. Home per self, no distress.

## 2019-03-07 NOTE — TELEPHONE ENCOUNTER
Wants a call about wheezing in chest and cardi doc telling her to get in here however we have no appts, wants a call to know what to do they did check for blood clots and she does not have any, this has been going on for a week.

## 2019-03-08 ENCOUNTER — OFFICE VISIT (OUTPATIENT)
Dept: FAMILY MEDICINE CLINIC | Facility: CLINIC | Age: 54
End: 2019-03-08

## 2019-03-08 VITALS
HEART RATE: 70 BPM | WEIGHT: 209 LBS | OXYGEN SATURATION: 98 % | BODY MASS INDEX: 34.82 KG/M2 | DIASTOLIC BLOOD PRESSURE: 82 MMHG | SYSTOLIC BLOOD PRESSURE: 146 MMHG | TEMPERATURE: 97.9 F | HEIGHT: 65 IN

## 2019-03-08 DIAGNOSIS — J98.01 BRONCHOSPASM: ICD-10-CM

## 2019-03-08 DIAGNOSIS — R93.89 ABNORMAL CT OF THE CHEST: ICD-10-CM

## 2019-03-08 DIAGNOSIS — J18.9 PNEUMONITIS: Primary | ICD-10-CM

## 2019-03-08 DIAGNOSIS — N30.00 ACUTE CYSTITIS WITHOUT HEMATURIA: ICD-10-CM

## 2019-03-08 LAB
ANION GAP SERPL CALCULATED.3IONS-SCNC: 10.2 MMOL/L
BASOPHILS # BLD AUTO: 0.02 10*3/MM3 (ref 0–0.2)
BASOPHILS NFR BLD AUTO: 0.3 % (ref 0–1.5)
BUN BLD-MCNC: 8 MG/DL (ref 6–20)
BUN/CREAT SERPL: 10.7 (ref 7–25)
CALCIUM SPEC-SCNC: 8.6 MG/DL (ref 8.6–10.5)
CHLORIDE SERPL-SCNC: 104 MMOL/L (ref 98–107)
CO2 SERPL-SCNC: 27.8 MMOL/L (ref 22–29)
CREAT BLD-MCNC: 0.75 MG/DL (ref 0.57–1)
DEPRECATED RDW RBC AUTO: 45.8 FL (ref 37–54)
EOSINOPHIL # BLD AUTO: 0.18 10*3/MM3 (ref 0–0.4)
EOSINOPHIL NFR BLD AUTO: 2.4 % (ref 0.3–6.2)
ERYTHROCYTE [DISTWIDTH] IN BLOOD BY AUTOMATED COUNT: 15.1 % (ref 12.3–15.4)
GFR SERPL CREATININE-BSD FRML MDRD: 81 ML/MIN/1.73
GLUCOSE BLD-MCNC: 89 MG/DL (ref 65–99)
HCT VFR BLD AUTO: 42.9 % (ref 34–46.6)
HGB BLD-MCNC: 13.1 G/DL (ref 12–15.9)
IMM GRANULOCYTES # BLD AUTO: 0.03 10*3/MM3 (ref 0–0.05)
IMM GRANULOCYTES NFR BLD AUTO: 0.4 % (ref 0–0.5)
LYMPHOCYTES # BLD AUTO: 0.98 10*3/MM3 (ref 0.7–3.1)
LYMPHOCYTES NFR BLD AUTO: 13.3 % (ref 19.6–45.3)
MCH RBC QN AUTO: 25.4 PG (ref 26.6–33)
MCHC RBC AUTO-ENTMCNC: 30.5 G/DL (ref 31.5–35.7)
MCV RBC AUTO: 83.1 FL (ref 79–97)
MONOCYTES # BLD AUTO: 0.28 10*3/MM3 (ref 0.1–0.9)
MONOCYTES NFR BLD AUTO: 3.8 % (ref 5–12)
NEUTROPHILS # BLD AUTO: 5.9 10*3/MM3 (ref 1.4–7)
NEUTROPHILS NFR BLD AUTO: 79.8 % (ref 42.7–76)
NRBC BLD AUTO-RTO: 0 /100 WBC (ref 0–0)
PLATELET # BLD AUTO: 342 10*3/MM3 (ref 140–450)
PMV BLD AUTO: 10.3 FL (ref 6–12)
POTASSIUM BLD-SCNC: 3.9 MMOL/L (ref 3.5–5.2)
RBC # BLD AUTO: 5.16 10*6/MM3 (ref 3.77–5.28)
SODIUM BLD-SCNC: 142 MMOL/L (ref 136–145)
WBC NRBC COR # BLD: 7.39 10*3/MM3 (ref 3.4–10.8)

## 2019-03-08 PROCEDURE — 99214 OFFICE O/P EST MOD 30 MIN: CPT | Performed by: INTERNAL MEDICINE

## 2019-03-08 PROCEDURE — 87040 BLOOD CULTURE FOR BACTERIA: CPT | Performed by: INTERNAL MEDICINE

## 2019-03-08 PROCEDURE — 80048 BASIC METABOLIC PNL TOTAL CA: CPT | Performed by: INTERNAL MEDICINE

## 2019-03-08 PROCEDURE — 85025 COMPLETE CBC W/AUTO DIFF WBC: CPT | Performed by: INTERNAL MEDICINE

## 2019-03-08 PROCEDURE — 36415 COLL VENOUS BLD VENIPUNCTURE: CPT | Performed by: INTERNAL MEDICINE

## 2019-03-08 RX ORDER — CLINDAMYCIN HYDROCHLORIDE 300 MG/1
300 CAPSULE ORAL 3 TIMES DAILY
Qty: 30 CAPSULE | Refills: 0 | Status: SHIPPED | OUTPATIENT
Start: 2019-03-08 | End: 2019-03-13 | Stop reason: HOSPADM

## 2019-03-08 RX ORDER — METHYLPREDNISOLONE 4 MG/1
TABLET ORAL
Qty: 21 TABLET | Refills: 0 | Status: SHIPPED | OUTPATIENT
Start: 2019-03-08 | End: 2019-03-13 | Stop reason: HOSPADM

## 2019-03-08 RX ORDER — ALBUTEROL SULFATE 90 UG/1
2 AEROSOL, METERED RESPIRATORY (INHALATION) EVERY 4 HOURS PRN
Qty: 1 INHALER | Refills: 1 | Status: SHIPPED | OUTPATIENT
Start: 2019-03-08

## 2019-03-08 NOTE — PROGRESS NOTES
Subjective   Whitney Schofield is a 53 y.o. female.   Patient with recent UTI given Macrobid for same increasing cough congestion did receive Levaquin cough is been present for 9 days  History of Present Illness as above dysuria treated for 2 days time with Macrobid recent Levaquin for cough not improved patient was seen by cardiology did get a CT of the chest which did not show evidence of PE.  Did show small area of groundglass appearance suggesting an acute patch of pneumonia this measures 15 mm in appearance radiologist did suggest repeat CT in 2-3 months after treatment sure it has resolved.  Patient is having cough congestion and wheezing.  Some dysuria  inicreased cough for 9 d  hadlevaquin wo help.  Temp last wk w-at up to 102    Drug allergies include Zithromax causing rash and iron infusion.  Patient is non-smoker family history positive COPD heart failure lung disease hypertension diabetes CAD breast cancer  Review of Systems   HENT: Positive for congestion.    Eyes: Negative.    Respiratory: Positive for cough, shortness of breath and wheezing.    Gastrointestinal: Negative.    Endocrine: Negative.    Genitourinary: Negative.    Musculoskeletal: Negative.    Skin: Negative.    Allergic/Immunologic: Negative.    Neurological: Positive for weakness and headache.   Hematological: Negative.    Psychiatric/Behavioral: Negative.        Objective   Physical Exam   Constitutional: She appears well-developed and well-nourished.   HENT:   Head: Normocephalic and atraumatic.   Right Ear: External ear normal.   Left Ear: External ear normal.   Mouth/Throat: Oropharynx is clear and moist.   Eyes: Conjunctivae are normal. Pupils are equal, round, and reactive to light.   Cardiovascular: Normal rate, regular rhythm and normal heart sounds.   Pulmonary/Chest: Effort normal.   Some wheezes bilaterally somewhat symmetric no distinct rales heard.   Abdominal: Soft. Bowel sounds are normal.   Neurological: She is alert.    Somewhat slow but stable gait and station   Skin: Skin is warm and dry.   Nursing note and vitals reviewed.        Assessment/Plan   1.  Pneumonitis plan clindamycin 300 3 times daily for 10 days time, blood cultures x2 Tessalon for cough as well as inhalers Breo inhaler given his suggest patient hold her methotrexate for 1 or 2 weeks but were treating this.  Prednisone should help compensate actually getting patient a Medrol Dosepak for better tolerance.  As she has had some palpitations with prednisone.  Try this if this relieves her significant wheezing.  Follow-up in 6 days to see how patient is doing we will need repeat urine at that time as well.    2.  Bronchospasm.  Inhaler Medrol Dosepak continue albuterol Tessalon for the cough    3.  Abnormal CT of chest presenting patient does well with course of treatment will tentatively get a CT of the chest follow-up in 2-3 months to make sure the small patch of groundglass appearance has resolved from her right lung.     4.  UTI making continue current Macrobid we will get BMP to assess renal function as well as getting blood cultures today as she is Aba on antibiotics do not think sputum render accurate culture.    Much of this encounter note is an electronic transcription/translation of spoken language to printed text.  The electronic translation of spoken language may permit erroneous, or at times, nonsensical words or phrases to be inadvertently transcribed.  Although I have reviewed the note for such errors, some may still exist. If there are questions or for further clarification, please contact me.

## 2019-03-11 ENCOUNTER — APPOINTMENT (OUTPATIENT)
Dept: GENERAL RADIOLOGY | Facility: HOSPITAL | Age: 54
End: 2019-03-11

## 2019-03-11 ENCOUNTER — HOSPITAL ENCOUNTER (INPATIENT)
Facility: HOSPITAL | Age: 54
LOS: 2 days | Discharge: HOME | End: 2019-03-13
Attending: HOSPITALIST | Admitting: HOSPITALIST

## 2019-03-11 DIAGNOSIS — J44.0 CHRONIC OBSTRUCTIVE PULMONARY DISEASE WITH ACUTE LOWER RESPIRATORY INFECTION (HCC): Primary | ICD-10-CM

## 2019-03-11 PROBLEM — G47.33 OSA ON CPAP: Status: RESOLVED | Noted: 2019-03-11 | Resolved: 2019-03-11

## 2019-03-11 PROBLEM — Z85.850 HISTORY OF PAPILLARY ADENOCARCINOMA OF THYROID: Status: ACTIVE | Noted: 2019-03-11

## 2019-03-11 PROBLEM — E06.3 HASHIMOTO'S THYROIDITIS: Status: ACTIVE | Noted: 2019-03-11

## 2019-03-11 PROBLEM — Z99.89 OSA ON CPAP: Status: RESOLVED | Noted: 2019-03-11 | Resolved: 2019-03-11

## 2019-03-11 PROBLEM — Z99.89 OSA ON CPAP: Status: ACTIVE | Noted: 2019-03-11

## 2019-03-11 PROBLEM — J98.01 BRONCHOSPASM: Status: ACTIVE | Noted: 2019-03-11

## 2019-03-11 PROBLEM — J18.9 PNA (PNEUMONIA): Status: ACTIVE | Noted: 2019-03-11

## 2019-03-11 PROBLEM — T83.511A UTI (URINARY TRACT INFECTION) DUE TO URINARY INDWELLING CATHETER: Status: ACTIVE | Noted: 2019-03-11

## 2019-03-11 PROBLEM — N39.0 UTI (URINARY TRACT INFECTION) DUE TO URINARY INDWELLING CATHETER: Status: ACTIVE | Noted: 2019-03-11

## 2019-03-11 PROBLEM — N39.0 UTI (URINARY TRACT INFECTION): Status: ACTIVE | Noted: 2019-03-11

## 2019-03-11 PROBLEM — G47.33 OSA ON CPAP: Status: ACTIVE | Noted: 2019-03-11

## 2019-03-11 PROBLEM — D84.9 IMMUNOCOMPROMISED (HCC): Status: ACTIVE | Noted: 2019-03-11

## 2019-03-11 PROBLEM — N39.0 UTI DUE TO EXTENDED-SPECTRUM BETA LACTAMASE (ESBL) PRODUCING ESCHERICHIA COLI: Status: ACTIVE | Noted: 2019-03-11

## 2019-03-11 PROBLEM — B96.29 UTI DUE TO EXTENDED-SPECTRUM BETA LACTAMASE (ESBL) PRODUCING ESCHERICHIA COLI: Status: ACTIVE | Noted: 2019-03-11

## 2019-03-11 PROBLEM — Z95.0 PRESENCE OF CARDIAC PACEMAKER: Status: ACTIVE | Noted: 2019-03-11

## 2019-03-11 PROBLEM — J45.909 ASTHMA: Status: ACTIVE | Noted: 2019-03-11

## 2019-03-11 PROBLEM — I42.8 NONISCHEMIC CARDIOMYOPATHY: Status: ACTIVE | Noted: 2019-03-11

## 2019-03-11 PROBLEM — I42.8 NONISCHEMIC CARDIOMYOPATHY: Status: RESOLVED | Noted: 2019-03-11 | Resolved: 2019-03-11

## 2019-03-11 PROBLEM — Z16.12 UTI DUE TO EXTENDED-SPECTRUM BETA LACTAMASE (ESBL) PRODUCING ESCHERICHIA COLI: Status: ACTIVE | Noted: 2019-03-11

## 2019-03-11 LAB
ALBUMIN SERPL-MCNC: 3.8 G/DL (ref 3.5–5.2)
ALBUMIN/GLOB SERPL: 1.2 G/DL
ALP SERPL-CCNC: 115 U/L (ref 39–117)
ALT SERPL W P-5'-P-CCNC: 16 U/L (ref 1–33)
ANION GAP SERPL CALCULATED.3IONS-SCNC: 12.4 MMOL/L
AST SERPL-CCNC: 11 U/L (ref 1–32)
B PARAPERT DNA SPEC QL NAA+PROBE: NOT DETECTED
B PERT DNA SPEC QL NAA+PROBE: NOT DETECTED
BASOPHILS # BLD AUTO: 0.02 10*3/MM3 (ref 0–0.2)
BASOPHILS NFR BLD AUTO: 0.3 % (ref 0–1.5)
BILIRUB SERPL-MCNC: 0.4 MG/DL (ref 0.1–1.2)
BILIRUB UR QL STRIP: NEGATIVE
BUN BLD-MCNC: 12 MG/DL (ref 6–20)
BUN/CREAT SERPL: 16.7 (ref 7–25)
C PNEUM DNA NPH QL NAA+NON-PROBE: NOT DETECTED
CALCIUM SPEC-SCNC: 8.5 MG/DL (ref 8.6–10.5)
CHLORIDE SERPL-SCNC: 106 MMOL/L (ref 98–107)
CHROMATIN AB SERPL-ACNC: 73.2 IU/ML (ref 0–14)
CLARITY UR: CLEAR
CO2 SERPL-SCNC: 27.6 MMOL/L (ref 22–29)
COLOR UR: YELLOW
CREAT BLD-MCNC: 0.72 MG/DL (ref 0.57–1)
CRP SERPL-MCNC: 0.25 MG/DL (ref 0–0.5)
D-LACTATE SERPL-SCNC: 2 MMOL/L (ref 0.5–2)
DEPRECATED RDW RBC AUTO: 45.2 FL (ref 37–54)
EOSINOPHIL # BLD AUTO: 0.14 10*3/MM3 (ref 0–0.4)
EOSINOPHIL NFR BLD AUTO: 2 % (ref 0.3–6.2)
ERYTHROCYTE [DISTWIDTH] IN BLOOD BY AUTOMATED COUNT: 15.2 % (ref 12.3–15.4)
ERYTHROCYTE [SEDIMENTATION RATE] IN BLOOD: 14 MM/HR (ref 0–30)
FLUAV H1 2009 PAND RNA NPH QL NAA+PROBE: NOT DETECTED
FLUAV H1 HA GENE NPH QL NAA+PROBE: NOT DETECTED
FLUAV H3 RNA NPH QL NAA+PROBE: NOT DETECTED
FLUAV SUBTYP SPEC NAA+PROBE: NOT DETECTED
FLUBV RNA ISLT QL NAA+PROBE: NOT DETECTED
GFR SERPL CREATININE-BSD FRML MDRD: 84 ML/MIN/1.73
GLOBULIN UR ELPH-MCNC: 3.1 GM/DL
GLUCOSE BLD-MCNC: 98 MG/DL (ref 65–99)
GLUCOSE UR STRIP-MCNC: NEGATIVE MG/DL
HADV DNA SPEC NAA+PROBE: NOT DETECTED
HCOV 229E RNA SPEC QL NAA+PROBE: NOT DETECTED
HCOV HKU1 RNA SPEC QL NAA+PROBE: NOT DETECTED
HCOV NL63 RNA SPEC QL NAA+PROBE: NOT DETECTED
HCOV OC43 RNA SPEC QL NAA+PROBE: NOT DETECTED
HCT VFR BLD AUTO: 37.9 % (ref 34–46.6)
HGB BLD-MCNC: 11.7 G/DL (ref 12–15.9)
HGB UR QL STRIP.AUTO: NEGATIVE
HMPV RNA NPH QL NAA+NON-PROBE: NOT DETECTED
HPIV1 RNA SPEC QL NAA+PROBE: NOT DETECTED
HPIV2 RNA SPEC QL NAA+PROBE: NOT DETECTED
HPIV3 RNA NPH QL NAA+PROBE: NOT DETECTED
HPIV4 P GENE NPH QL NAA+PROBE: NOT DETECTED
IMM GRANULOCYTES # BLD AUTO: 0.05 10*3/MM3 (ref 0–0.05)
IMM GRANULOCYTES NFR BLD AUTO: 0.7 % (ref 0–0.5)
KETONES UR QL STRIP: NEGATIVE
LEUKOCYTE ESTERASE UR QL STRIP.AUTO: NEGATIVE
LYMPHOCYTES # BLD AUTO: 1.41 10*3/MM3 (ref 0.7–3.1)
LYMPHOCYTES NFR BLD AUTO: 20.4 % (ref 19.6–45.3)
M PNEUMO IGG SER IA-ACNC: NOT DETECTED
MCH RBC QN AUTO: 25.5 PG (ref 26.6–33)
MCHC RBC AUTO-ENTMCNC: 30.9 G/DL (ref 31.5–35.7)
MCV RBC AUTO: 82.8 FL (ref 79–97)
MONOCYTES # BLD AUTO: 0.47 10*3/MM3 (ref 0.1–0.9)
MONOCYTES NFR BLD AUTO: 6.8 % (ref 5–12)
NEUTROPHILS # BLD AUTO: 4.81 10*3/MM3 (ref 1.4–7)
NEUTROPHILS NFR BLD AUTO: 69.8 % (ref 42.7–76)
NITRITE UR QL STRIP: NEGATIVE
NRBC BLD AUTO-RTO: 0 /100 WBC (ref 0–0)
PH UR STRIP.AUTO: 6.5 [PH] (ref 5–8)
PLATELET # BLD AUTO: 339 10*3/MM3 (ref 140–450)
PMV BLD AUTO: 9.2 FL (ref 6–12)
POTASSIUM BLD-SCNC: 3.4 MMOL/L (ref 3.5–5.2)
PROCALCITONIN SERPL-MCNC: <0.02 NG/ML (ref 0.1–0.25)
PROT SERPL-MCNC: 6.9 G/DL (ref 6–8.5)
PROT UR QL STRIP: NEGATIVE
RBC # BLD AUTO: 4.58 10*6/MM3 (ref 3.77–5.28)
RHINOVIRUS RNA SPEC NAA+PROBE: NOT DETECTED
RSV RNA NPH QL NAA+NON-PROBE: NOT DETECTED
SODIUM BLD-SCNC: 146 MMOL/L (ref 136–145)
SP GR UR STRIP: 1.01 (ref 1–1.03)
TSH SERPL DL<=0.05 MIU/L-ACNC: 0.06 MIU/ML (ref 0.27–4.2)
UROBILINOGEN UR QL STRIP: NORMAL
WBC NRBC COR # BLD: 6.9 10*3/MM3 (ref 3.4–10.8)

## 2019-03-11 PROCEDURE — 84145 PROCALCITONIN (PCT): CPT | Performed by: NURSE PRACTITIONER

## 2019-03-11 PROCEDURE — 87633 RESP VIRUS 12-25 TARGETS: CPT | Performed by: NURSE PRACTITIONER

## 2019-03-11 PROCEDURE — 81003 URINALYSIS AUTO W/O SCOPE: CPT | Performed by: NURSE PRACTITIONER

## 2019-03-11 PROCEDURE — 25010000002 METHYLPREDNISOLONE PER 40 MG: Performed by: INTERNAL MEDICINE

## 2019-03-11 PROCEDURE — G0378 HOSPITAL OBSERVATION PER HR: HCPCS

## 2019-03-11 PROCEDURE — 87581 M.PNEUMON DNA AMP PROBE: CPT | Performed by: NURSE PRACTITIONER

## 2019-03-11 PROCEDURE — 80053 COMPREHEN METABOLIC PANEL: CPT | Performed by: NURSE PRACTITIONER

## 2019-03-11 PROCEDURE — 84443 ASSAY THYROID STIM HORMONE: CPT | Performed by: NURSE PRACTITIONER

## 2019-03-11 PROCEDURE — 87486 CHLMYD PNEUM DNA AMP PROBE: CPT | Performed by: NURSE PRACTITIONER

## 2019-03-11 PROCEDURE — 86140 C-REACTIVE PROTEIN: CPT | Performed by: NURSE PRACTITIONER

## 2019-03-11 PROCEDURE — 94799 UNLISTED PULMONARY SVC/PX: CPT

## 2019-03-11 PROCEDURE — 85652 RBC SED RATE AUTOMATED: CPT | Performed by: NURSE PRACTITIONER

## 2019-03-11 PROCEDURE — 87798 DETECT AGENT NOS DNA AMP: CPT | Performed by: NURSE PRACTITIONER

## 2019-03-11 PROCEDURE — 71046 X-RAY EXAM CHEST 2 VIEWS: CPT

## 2019-03-11 PROCEDURE — 63710000001 PREDNISONE PER 1 MG: Performed by: HOSPITALIST

## 2019-03-11 PROCEDURE — 83605 ASSAY OF LACTIC ACID: CPT | Performed by: NURSE PRACTITIONER

## 2019-03-11 PROCEDURE — 85025 COMPLETE CBC W/AUTO DIFF WBC: CPT | Performed by: NURSE PRACTITIONER

## 2019-03-11 PROCEDURE — 25010000002 MEROPENEM PER 100 MG: Performed by: HOSPITALIST

## 2019-03-11 PROCEDURE — 86431 RHEUMATOID FACTOR QUANT: CPT | Performed by: NURSE PRACTITIONER

## 2019-03-11 PROCEDURE — 94640 AIRWAY INHALATION TREATMENT: CPT

## 2019-03-11 RX ORDER — SODIUM CHLORIDE 0.9 % (FLUSH) 0.9 %
3-10 SYRINGE (ML) INJECTION AS NEEDED
Status: DISCONTINUED | OUTPATIENT
Start: 2019-03-11 | End: 2019-03-13 | Stop reason: HOSPADM

## 2019-03-11 RX ORDER — BUDESONIDE AND FORMOTEROL FUMARATE DIHYDRATE 80; 4.5 UG/1; UG/1
2 AEROSOL RESPIRATORY (INHALATION)
Status: DISCONTINUED | OUTPATIENT
Start: 2019-03-11 | End: 2019-03-11

## 2019-03-11 RX ORDER — SODIUM CHLORIDE 9 MG/ML
75 INJECTION, SOLUTION INTRAVENOUS CONTINUOUS
Status: DISCONTINUED | OUTPATIENT
Start: 2019-03-11 | End: 2019-03-13 | Stop reason: HOSPADM

## 2019-03-11 RX ORDER — ONDANSETRON 2 MG/ML
4 INJECTION INTRAMUSCULAR; INTRAVENOUS EVERY 6 HOURS PRN
Status: DISCONTINUED | OUTPATIENT
Start: 2019-03-11 | End: 2019-03-13 | Stop reason: HOSPADM

## 2019-03-11 RX ORDER — NITROGLYCERIN 0.4 MG/1
0.4 TABLET SUBLINGUAL
Status: DISCONTINUED | OUTPATIENT
Start: 2019-03-11 | End: 2019-03-13 | Stop reason: HOSPADM

## 2019-03-11 RX ORDER — ACETAMINOPHEN 325 MG/1
650 TABLET ORAL EVERY 6 HOURS PRN
Status: DISCONTINUED | OUTPATIENT
Start: 2019-03-11 | End: 2019-03-13 | Stop reason: HOSPADM

## 2019-03-11 RX ORDER — GUAIFENESIN 600 MG/1
1200 TABLET, EXTENDED RELEASE ORAL EVERY 12 HOURS
Status: DISCONTINUED | OUTPATIENT
Start: 2019-03-11 | End: 2019-03-13 | Stop reason: HOSPADM

## 2019-03-11 RX ORDER — POTASSIUM CHLORIDE 1.5 G/1.77G
40 POWDER, FOR SOLUTION ORAL ONCE
Status: DISCONTINUED | OUTPATIENT
Start: 2019-03-11 | End: 2019-03-11 | Stop reason: CLARIF

## 2019-03-11 RX ORDER — ACETAMINOPHEN 325 MG/1
650 TABLET ORAL EVERY 4 HOURS PRN
Status: DISCONTINUED | OUTPATIENT
Start: 2019-03-11 | End: 2019-03-13 | Stop reason: HOSPADM

## 2019-03-11 RX ORDER — GUAIFENESIN 200 MG/1
200 TABLET ORAL
Status: DISCONTINUED | OUTPATIENT
Start: 2019-03-11 | End: 2019-03-11

## 2019-03-11 RX ORDER — IPRATROPIUM BROMIDE AND ALBUTEROL SULFATE 2.5; .5 MG/3ML; MG/3ML
3 SOLUTION RESPIRATORY (INHALATION)
Status: DISCONTINUED | OUTPATIENT
Start: 2019-03-11 | End: 2019-03-13 | Stop reason: HOSPADM

## 2019-03-11 RX ORDER — POTASSIUM CHLORIDE 750 MG/1
40 CAPSULE, EXTENDED RELEASE ORAL ONCE
Status: COMPLETED | OUTPATIENT
Start: 2019-03-11 | End: 2019-03-11

## 2019-03-11 RX ORDER — SODIUM CHLORIDE 0.9 % (FLUSH) 0.9 %
3 SYRINGE (ML) INJECTION EVERY 12 HOURS SCHEDULED
Status: DISCONTINUED | OUTPATIENT
Start: 2019-03-11 | End: 2019-03-13 | Stop reason: HOSPADM

## 2019-03-11 RX ORDER — IPRATROPIUM BROMIDE AND ALBUTEROL SULFATE 2.5; .5 MG/3ML; MG/3ML
3 SOLUTION RESPIRATORY (INHALATION) EVERY 4 HOURS PRN
Status: DISCONTINUED | OUTPATIENT
Start: 2019-03-11 | End: 2019-03-13 | Stop reason: HOSPADM

## 2019-03-11 RX ORDER — DEXTROMETHORPHAN POLISTIREX 30 MG/5ML
60 SUSPENSION ORAL EVERY 12 HOURS SCHEDULED
Status: DISCONTINUED | OUTPATIENT
Start: 2019-03-11 | End: 2019-03-13 | Stop reason: HOSPADM

## 2019-03-11 RX ORDER — PREDNISONE 20 MG/1
40 TABLET ORAL ONCE
Status: COMPLETED | OUTPATIENT
Start: 2019-03-11 | End: 2019-03-11

## 2019-03-11 RX ORDER — METHYLPREDNISOLONE SODIUM SUCCINATE 40 MG/ML
40 INJECTION, POWDER, LYOPHILIZED, FOR SOLUTION INTRAMUSCULAR; INTRAVENOUS ONCE
Status: COMPLETED | OUTPATIENT
Start: 2019-03-11 | End: 2019-03-11

## 2019-03-11 RX ORDER — PREDNISONE 20 MG/1
40 TABLET ORAL
Status: DISCONTINUED | OUTPATIENT
Start: 2019-03-12 | End: 2019-03-13 | Stop reason: HOSPADM

## 2019-03-11 RX ORDER — DOXYCYCLINE 100 MG/1
100 CAPSULE ORAL EVERY 12 HOURS SCHEDULED
Status: DISCONTINUED | OUTPATIENT
Start: 2019-03-11 | End: 2019-03-13 | Stop reason: HOSPADM

## 2019-03-11 RX ADMIN — POTASSIUM CHLORIDE 40 MEQ: 750 CAPSULE, EXTENDED RELEASE ORAL at 18:05

## 2019-03-11 RX ADMIN — SODIUM CHLORIDE, PRESERVATIVE FREE 3 ML: 5 INJECTION INTRAVENOUS at 20:59

## 2019-03-11 RX ADMIN — DEXTROMETHORPHAN POLISTIREX 60 MG: 30 SUSPENSION ORAL at 20:54

## 2019-03-11 RX ADMIN — GUAIFENESIN 1200 MG: 600 TABLET, EXTENDED RELEASE ORAL at 16:03

## 2019-03-11 RX ADMIN — IPRATROPIUM BROMIDE AND ALBUTEROL SULFATE 3 ML: 2.5; .5 SOLUTION RESPIRATORY (INHALATION) at 22:23

## 2019-03-11 RX ADMIN — ACETAMINOPHEN 650 MG: 325 TABLET, FILM COATED ORAL at 22:51

## 2019-03-11 RX ADMIN — MEROPENEM 1 G: 1 INJECTION, POWDER, FOR SOLUTION INTRAVENOUS at 18:04

## 2019-03-11 RX ADMIN — SODIUM CHLORIDE, PRESERVATIVE FREE 3 ML: 5 INJECTION INTRAVENOUS at 16:03

## 2019-03-11 RX ADMIN — METHYLPREDNISOLONE SODIUM SUCCINATE 40 MG: 40 INJECTION, POWDER, FOR SOLUTION INTRAMUSCULAR; INTRAVENOUS at 18:33

## 2019-03-11 RX ADMIN — DOXYCYCLINE 100 MG: 100 CAPSULE ORAL at 22:47

## 2019-03-11 RX ADMIN — SODIUM CHLORIDE 75 ML/HR: 900 INJECTION INTRAVENOUS at 23:57

## 2019-03-11 RX ADMIN — PREDNISONE 40 MG: 20 TABLET ORAL at 18:05

## 2019-03-11 RX ADMIN — SODIUM CHLORIDE 75 ML/HR: 900 INJECTION INTRAVENOUS at 16:03

## 2019-03-11 RX ADMIN — IPRATROPIUM BROMIDE AND ALBUTEROL SULFATE 3 ML: 2.5; .5 SOLUTION RESPIRATORY (INHALATION) at 15:47

## 2019-03-11 NOTE — PLAN OF CARE
Problem: Patient Care Overview  Goal: Plan of Care Review  Outcome: Ongoing (interventions implemented as appropriate)   03/11/19 3856   Coping/Psychosocial   Plan of Care Reviewed With patient   OTHER   Outcome Summary patient direct admit with c/o cough and fever, IV abx for UTI as well as possible pna, SOB with excertion, pulm and ID consulted, NS @ 75mL/hr, RA , independent, arthritic pain, vss, will continue to monitor      Goal: Individualization and Mutuality  Outcome: Ongoing (interventions implemented as appropriate)    Goal: Discharge Needs Assessment  Outcome: Ongoing (interventions implemented as appropriate)    Goal: Interprofessional Rounds/Family Conf  Outcome: Ongoing (interventions implemented as appropriate)      Problem: Fall Risk (Adult)  Goal: Identify Related Risk Factors and Signs and Symptoms  Outcome: Ongoing (interventions implemented as appropriate)    Goal: Absence of Fall  Outcome: Ongoing (interventions implemented as appropriate)      Problem: Breathing Pattern Ineffective (Adult)  Goal: Identify Related Risk Factors and Signs and Symptoms  Outcome: Ongoing (interventions implemented as appropriate)    Goal: Effective Oxygenation/Ventilation  Outcome: Ongoing (interventions implemented as appropriate)    Goal: Anxiety/Fear Reduction  Outcome: Ongoing (interventions implemented as appropriate)

## 2019-03-11 NOTE — CONSULTS
[facility]    Reason for Consult: pneumonia, copd with ae    HPI:  Mrs. Schofield is a 52yo wf with a history of fixed moderate obstruction on pfts.  She is a lifelong nonsmoker, but says she had frequent respiratory infections as a child.  She did not have any formal diagnosis of asthma.  She uses only prn albuterol at home, no maintence inhaler.  She does have a history of RA on immunosuppressants.  About two weeks ago, she had increased cough and sputum production and shortness of breath.  This was relatively sudden onset.  It has been unrelieved thus far by outpatient antibiotics and use of her albuterol inhaler.  It is not improving. It is moderate in nature.  Her methotrexate and arva where held.  CT chest shows a patchy area of infiltrate in the right upper lobe and possibly some smaller areas scatter throughout.  procal is 0.02; wbc 6.9 with normal diff 2% eos; abs 0.14. Viral panel is negative    Past Medical History:   Diagnosis Date   • Anemia    • ASD (atrial septal defect)    • Asthma    • Asystole (CMS/HCC)     Post ASD closure asystole and high-grade AV block.  s/p pacemaker 5/11/10.   • Celiac disease    • DVT (deep venous thrombosis) (CMS/HCC)     Left subclavian DVT following right atrial pacemaker lead revision in 2/14   • Edema    • Hashimoto's thyroiditis     s/p partial thyroidectomy 1/13/10   • Heart palpitations    • Hx of thyroid cancer    • Malignant neoplasm of thyroid gland (CMS/HCC)     Papillary carcinoma of the thyroid - s/p residual thyroidectomy in 2/11.   • Migraine    • Nonischemic cardiomyopathy (CMS/HCC)     EF as low as 35% 3/13.  EF 6/21/13 by CHANDANA was 50-55%, and 53% by echo on 6/17/14.  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  EF 50% by CHANDANA on 4/18/16.   • NSVT (nonsustained ventricular tachycardia) (CMS/HCC)     Noted by event recorder 9/12.  EP study by Dr. Aguilar on 11/20/12 showed no inducible VT.   • LUKAS on CPAP    • Papillary thyroid carcinoma  (CMS/[facility]) 08/27/2013   • Rheumatoid arthritis (CMS/[facility])     Severe and debilitating    • Secundum ASD     s/p open closure with a PeriPatch xenograft by Dr. Harper on 5/6/10 by Dr. Harper at [facility].   • Sleep apnea    • SOB (shortness of breath)     Multifactorial    • Thrombus due to any device, implant or graft     Extensive thrombus formation on the pacemaker leads by CHANDANA 3/25/13.  Initiated on Coumadin transiently at that time - improved significantly by CHANDANA 6/21/13.     Social History     Socioeconomic History   • Marital status:      Spouse name: Not on file   • Number of children: 2   • Years of education: Not on file   • Highest education level: Not on file   Occupational History   • Occupation:    Tobacco Use   • Smoking status: Never Smoker   • Smokeless tobacco: Never Used   Substance and Sexual Activity   • Alcohol use: No   • Drug use: No   • Sexual activity: Yes     Partners: Male     Family History   Problem Relation Age of Onset   • COPD Mother    • Heart failure Mother         CHF   • Lung disease Mother    • Hypertension Mother    • Diabetes Mother    • Coronary artery disease Mother    • Diabetes Sister    • Breast cancer Maternal Aunt    • Malig Hyperthermia Neg Hx      Meds: reviewed as per chart  ALL: ferumoxytol, azithromcyin  ROS:  Constitutional: Positive for appetite change, chills, fatigue and fever. Negative for activity change, diaphoresis and unexpected weight change.   HENT: Positive for congestion. Negative for trouble swallowing.    Eyes: Negative.    Respiratory: Positive for cough, chest tightness, shortness of breath and wheezing. Negative for choking.    Cardiovascular: Positive for chest pain. Negative for palpitations and leg swelling.        Pleuritic chest pain   Gastrointestinal: Positive for nausea. Negative for abdominal distention, abdominal pain, blood in stool, constipation, diarrhea, rectal pain and vomiting.   Endocrine: Negative.   Negative for polydipsia, polyphagia and polyuria.   Genitourinary: Positive for dysuria and frequency.   Musculoskeletal: Positive for myalgias. Negative for back pain.   Skin: Positive for pallor. Negative for color change.   Allergic/Immunologic: Positive for immunocompromised state.   Neurological: Positive for light-headedness. Negative for dizziness.   Hematological: Does not bruise/bleed easily.   Psychiatric/Behavioral: Negative.      Vital Sign Min/Max for last 24 hours  Temp  Min: 97.9 °F (36.6 °C)  Max: 97.9 °F (36.6 °C)   BP  Min: 127/69  Max: 143/87   Pulse  Min: 59  Max: 66   Resp  Min: 18  Max: 20   SpO2  Min: 94 %  Max: 99 %   No Data Recorded   Weight  Min: 95.8 kg (211 lb 1.6 oz)  Max: 95.8 kg (211 lb 1.6 oz)     GEN:  , appears ill, obese, AxOx3  HEENT: PERRL, EOMI, no icterus, mmm, no jvd, trachea midline, neck supple  CHEST: decreased ae bilat, + wheezes, + crackles, no use of accessory muscles  CV: RRR, no m/g/r  ABD: soft, nt, nd +bs, no hepatosplenomegaly  EXT: no c/c/e  SKIN: no rashes, no xanthomas, nl turgor  LYMPH: no palpable cervical or supraclavicular lymphadenopathy  NEURO: CN 2-12 intact and symmetric bilaterally  PSYCH: nl affect, nl orientation, nl judgement, nl mood  MSK: no kyphoscoliosis, 5/5 strength ue and le bilaterally    Labs:  Na 146  k 3.4  Bicarb 27.6  procal 0.02  Wbc 6.9  hgb 11.7  plts 339    cxr and ct chest: I reviewed actual images myself    A/P:  1. 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 She should have outpatient alpha one testing. Probably she should be on maintence inhalers. Bronchodilators, steroids  2. Acute bronchitis and likely pneumonia -- would treat atypicals and pulm toilet  3. Immunosuppressed patient -- agree with holding methotrexate and arava  4. LUKAS -- resume cpap when able  5. UTI  6. Hypokalemia -- replace

## 2019-03-11 NOTE — PROGRESS NOTES
Pharmacy Consult - Meropenem Dosing (Initial Note)    Whitney Schofield has been consulted for pharmacy to dose meropenem for UTI (h/o ESBL producing organisms) per Dr. Hernandez's request.    Duration of Therapy: 7 days    Other Antimicrobials: None    Relevant clinical data and objective history reviewed:  53 y.o. female         Vitals:    03/11/19 1344 03/11/19 1547   BP: 143/87    BP Location: Left arm    Pulse:  59   Resp: 18 20   Temp: 97.9 °F (36.6 °C)    TempSrc: Oral    SpO2: 96% 99%     Patient is an immunocompromised female with RA recently diagnosed with ESBL+ E. Coli UTI and PNA on 3/6. She is still complaining of dysuria even though she has been on macrobid outpatient.     Estimated Creatinine Clearance: 97.6 mL/min (by C-G formula based on SCr of 0.75 mg/dL).    Lab Results   Component Value Date    WBC 6.90 03/11/2019     Temp Readings from Last 3 Encounters:   03/11/19 97.9 °F (36.6 °C) (Oral)      Baseline culture/source/susceptibility:   3/8: Bcx-NGTD  3/11: Ucx-in process    Assessment/Plan    1. Will give a dose of meropenem 1g IV now over 30 minutes, then start meropenem 500 mg IV q6h based on indication of UTI and patient's renal function (CrCl >50 mL/min).    2. Will monitor serum creatinine every 24 hours for the first 3 days then at least every 48 hours per dosing recommendations. SCr was stable on 3/8 @ 0.75-will order BMP for tomorrow.    3. Pharmacy will continue to follow daily while on meropenem, and adjust if needed.     Thank you for allowing me to participate in your patient's care,  Guera Balderas, Pharm.D., BCPS

## 2019-03-12 PROBLEM — J18.9 PNEUMONIA: Status: ACTIVE | Noted: 2019-03-12

## 2019-03-12 LAB
ANION GAP SERPL CALCULATED.3IONS-SCNC: 13.5 MMOL/L
BASOPHILS # BLD AUTO: 0.01 10*3/MM3 (ref 0–0.2)
BASOPHILS NFR BLD AUTO: 0.2 % (ref 0–1.5)
BUN BLD-MCNC: 11 MG/DL (ref 6–20)
BUN/CREAT SERPL: 15.1 (ref 7–25)
CALCIUM SPEC-SCNC: 8 MG/DL (ref 8.6–10.5)
CHLORIDE SERPL-SCNC: 106 MMOL/L (ref 98–107)
CO2 SERPL-SCNC: 22.5 MMOL/L (ref 22–29)
CREAT BLD-MCNC: 0.73 MG/DL (ref 0.57–1)
DEPRECATED RDW RBC AUTO: 45.8 FL (ref 37–54)
EOSINOPHIL # BLD AUTO: 0 10*3/MM3 (ref 0–0.4)
EOSINOPHIL NFR BLD AUTO: 0 % (ref 0.3–6.2)
ERYTHROCYTE [DISTWIDTH] IN BLOOD BY AUTOMATED COUNT: 15.2 % (ref 12.3–15.4)
GFR SERPL CREATININE-BSD FRML MDRD: 83 ML/MIN/1.73
GLUCOSE BLD-MCNC: 132 MG/DL (ref 65–99)
HCT VFR BLD AUTO: 37.6 % (ref 34–46.6)
HGB BLD-MCNC: 11.3 G/DL (ref 12–15.9)
IMM GRANULOCYTES # BLD AUTO: 0.06 10*3/MM3 (ref 0–0.05)
IMM GRANULOCYTES NFR BLD AUTO: 0.9 % (ref 0–0.5)
L PNEUMO1 AG UR QL IA: NEGATIVE
LYMPHOCYTES # BLD AUTO: 0.62 10*3/MM3 (ref 0.7–3.1)
LYMPHOCYTES NFR BLD AUTO: 9.8 % (ref 19.6–45.3)
MCH RBC QN AUTO: 25 PG (ref 26.6–33)
MCHC RBC AUTO-ENTMCNC: 30.1 G/DL (ref 31.5–35.7)
MCV RBC AUTO: 83.2 FL (ref 79–97)
MONOCYTES # BLD AUTO: 0.09 10*3/MM3 (ref 0.1–0.9)
MONOCYTES NFR BLD AUTO: 1.4 % (ref 5–12)
NEUTROPHILS # BLD AUTO: 5.56 10*3/MM3 (ref 1.4–7)
NEUTROPHILS NFR BLD AUTO: 87.7 % (ref 42.7–76)
NRBC BLD AUTO-RTO: 0 /100 WBC (ref 0–0)
PLATELET # BLD AUTO: 381 10*3/MM3 (ref 140–450)
PMV BLD AUTO: 9.5 FL (ref 6–12)
POTASSIUM BLD-SCNC: 4.3 MMOL/L (ref 3.5–5.2)
RBC # BLD AUTO: 4.52 10*6/MM3 (ref 3.77–5.28)
SODIUM BLD-SCNC: 142 MMOL/L (ref 136–145)
WBC NRBC COR # BLD: 6.34 10*3/MM3 (ref 3.4–10.8)

## 2019-03-12 PROCEDURE — 99223 1ST HOSP IP/OBS HIGH 75: CPT | Performed by: INTERNAL MEDICINE

## 2019-03-12 PROCEDURE — 80048 BASIC METABOLIC PNL TOTAL CA: CPT | Performed by: HOSPITALIST

## 2019-03-12 PROCEDURE — 87899 AGENT NOS ASSAY W/OPTIC: CPT | Performed by: INTERNAL MEDICINE

## 2019-03-12 PROCEDURE — 85025 COMPLETE CBC W/AUTO DIFF WBC: CPT | Performed by: HOSPITALIST

## 2019-03-12 PROCEDURE — 25010000002 MEROPENEM PER 100 MG: Performed by: HOSPITALIST

## 2019-03-12 PROCEDURE — 94799 UNLISTED PULMONARY SVC/PX: CPT

## 2019-03-12 PROCEDURE — 25010000002 METHYLPREDNISOLONE PER 40 MG: Performed by: INTERNAL MEDICINE

## 2019-03-12 PROCEDURE — 25010000002 MEROPENEM: Performed by: HOSPITALIST

## 2019-03-12 PROCEDURE — 63710000001 PREDNISONE PER 1 MG: Performed by: NURSE PRACTITIONER

## 2019-03-12 RX ORDER — HYDROCODONE BITARTRATE AND ACETAMINOPHEN 10; 325 MG/1; MG/1
1 TABLET ORAL EVERY 6 HOURS PRN
Status: DISCONTINUED | OUTPATIENT
Start: 2019-03-12 | End: 2019-03-13 | Stop reason: HOSPADM

## 2019-03-12 RX ORDER — SODIUM CHLORIDE FOR INHALATION 7 %
4 VIAL, NEBULIZER (ML) INHALATION
Status: DISCONTINUED | OUTPATIENT
Start: 2019-03-12 | End: 2019-03-13 | Stop reason: HOSPADM

## 2019-03-12 RX ORDER — METHYLPREDNISOLONE SODIUM SUCCINATE 40 MG/ML
40 INJECTION, POWDER, LYOPHILIZED, FOR SOLUTION INTRAMUSCULAR; INTRAVENOUS ONCE
Status: COMPLETED | OUTPATIENT
Start: 2019-03-12 | End: 2019-03-12

## 2019-03-12 RX ORDER — NITROFURANTOIN 25; 75 MG/1; MG/1
100 CAPSULE ORAL EVERY 12 HOURS SCHEDULED
Status: DISCONTINUED | OUTPATIENT
Start: 2019-03-12 | End: 2019-03-13 | Stop reason: HOSPADM

## 2019-03-12 RX ADMIN — HYDROCODONE BITARTRATE AND ACETAMINOPHEN 1 TABLET: 10; 325 TABLET ORAL at 17:53

## 2019-03-12 RX ADMIN — DOXYCYCLINE 100 MG: 100 CAPSULE ORAL at 21:00

## 2019-03-12 RX ADMIN — DOXYCYCLINE 100 MG: 100 CAPSULE ORAL at 08:42

## 2019-03-12 RX ADMIN — METHYLPREDNISOLONE SODIUM SUCCINATE 40 MG: 40 INJECTION, POWDER, FOR SOLUTION INTRAMUSCULAR; INTRAVENOUS at 09:44

## 2019-03-12 RX ADMIN — MEROPENEM 500 MG: 500 INJECTION, POWDER, FOR SOLUTION INTRAVENOUS at 06:03

## 2019-03-12 RX ADMIN — IPRATROPIUM BROMIDE AND ALBUTEROL SULFATE 3 ML: 2.5; .5 SOLUTION RESPIRATORY (INHALATION) at 19:08

## 2019-03-12 RX ADMIN — SODIUM CHLORIDE, PRESERVATIVE FREE 3 ML: 5 INJECTION INTRAVENOUS at 08:43

## 2019-03-12 RX ADMIN — GUAIFENESIN 1200 MG: 600 TABLET, EXTENDED RELEASE ORAL at 16:01

## 2019-03-12 RX ADMIN — HYDROCODONE BITARTRATE AND ACETAMINOPHEN 1 TABLET: 10; 325 TABLET ORAL at 23:44

## 2019-03-12 RX ADMIN — MEROPENEM 500 MG: 500 INJECTION, POWDER, FOR SOLUTION INTRAVENOUS at 00:00

## 2019-03-12 RX ADMIN — IPRATROPIUM BROMIDE AND ALBUTEROL SULFATE 3 ML: 2.5; .5 SOLUTION RESPIRATORY (INHALATION) at 15:15

## 2019-03-12 RX ADMIN — SODIUM CHLORIDE, PRESERVATIVE FREE 3 ML: 5 INJECTION INTRAVENOUS at 22:05

## 2019-03-12 RX ADMIN — GUAIFENESIN 1200 MG: 600 TABLET, EXTENDED RELEASE ORAL at 06:03

## 2019-03-12 RX ADMIN — NITROFURANTOIN MONOHYDRATE/MACROCRYSTALLINE 100 MG: 25; 75 CAPSULE ORAL at 09:44

## 2019-03-12 RX ADMIN — DEXTROMETHORPHAN POLISTIREX 60 MG: 30 SUSPENSION ORAL at 21:00

## 2019-03-12 RX ADMIN — IPRATROPIUM BROMIDE AND ALBUTEROL SULFATE 3 ML: 2.5; .5 SOLUTION RESPIRATORY (INHALATION) at 07:06

## 2019-03-12 RX ADMIN — DEXTROMETHORPHAN POLISTIREX 60 MG: 30 SUSPENSION ORAL at 08:42

## 2019-03-12 RX ADMIN — SODIUM CHLORIDE, PRESERVATIVE FREE 3 ML: 5 INJECTION INTRAVENOUS at 22:06

## 2019-03-12 RX ADMIN — SODIUM CHLORIDE 75 ML/HR: 900 INJECTION INTRAVENOUS at 11:27

## 2019-03-12 RX ADMIN — HYDROCODONE BITARTRATE AND ACETAMINOPHEN 1 TABLET: 10; 325 TABLET ORAL at 08:42

## 2019-03-12 RX ADMIN — SODIUM CHLORIDE 4 ML: 7 NEBU SOLN,3 % NEBU at 19:08

## 2019-03-12 RX ADMIN — PREDNISONE 40 MG: 20 TABLET ORAL at 08:42

## 2019-03-12 RX ADMIN — IPRATROPIUM BROMIDE AND ALBUTEROL SULFATE 3 ML: 2.5; .5 SOLUTION RESPIRATORY (INHALATION) at 11:13

## 2019-03-12 RX ADMIN — NITROFURANTOIN MONOHYDRATE/MACROCRYSTALLINE 100 MG: 25; 75 CAPSULE ORAL at 21:00

## 2019-03-12 RX ADMIN — SODIUM CHLORIDE, PRESERVATIVE FREE 3 ML: 5 INJECTION INTRAVENOUS at 08:42

## 2019-03-12 NOTE — PROGRESS NOTES
[facility]     Mar/chart reviewed  F/u copd with ae.  Some continued cough and shortness of breath    Vital Sign Min/Max for last 24 hours  Temp  Min: 97.8 °F (36.6 °C)  Max: 97.9 °F (36.6 °C)   BP  Min: 127/69  Max: 143/87   Pulse  Min: 59  Max: 74   Resp  Min: 14  Max: 20   SpO2  Min: 93 %  Max: 99 %   No Data Recorded   Weight  Min: 95.1 kg (209 lb 10.5 oz)  Max: 95.8 kg (211 lb 1.6 oz)     Appears ill, axox3,   perrl, eomi, no icterus,  mmm, no jvd, trachea midline, neck supple,  chest decreased bilaterally, no crackles, + wheezes, --better ae today  rrr,   soft, nt, nd +bs,  no c/c/ e    Labs:  Cr 0.73  Na 142  Bicarb 22.5  Wbc 6.34  hgb 11.3  plts 381  Micro: negative    A/P:  1. 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 She should have outpatient alpha one testing. Probably she should be on maintence inhalers. Bronchodilators, steroids  2. Acute bronchitis and likely pneumonia -- would treat atypicals and pulm toilet  3. Immunosuppressed patient -- agree with holding methotrexate and arava  4. LUKAS -- resume cpap when able  5. UTI  6. Hypokalemia -- replace    I suspect she will be ok to go tomorrow. Additional IV steroid today and try some hypertonic saline nebs.   Would complete doxy for 5 days; would do steroid taper; nebulizer for home. Quick outpatient follow up with [facility] in 2-4 weeks with spirometry. Repeat ct chest in 3 months time.

## 2019-03-12 NOTE — PROGRESS NOTES
[facility]     LOS: 1 day     Subjective:    CC:No chief complaint on file.    DIET:  Diet Order   Procedures   • Diet Regular     Eating well  No cp, no soa, coughing  No fever and no chills  No n/v/d    Objective:    Vital Signs:  Temp:  [97.8 °F (36.6 °C)-97.9 °F (36.6 °C)] 97.8 °F (36.6 °C)  Heart Rate:  [59-74] 74  Resp:  [14-20] 16  BP: (127-143)/(69-87) 133/73    SpO2:  [93 %-99 %] 96 %  on   ;   Device (Oxygen Therapy): room air  Body mass index is 35.99 kg/m².    Physical Exam   Constitutional: She appears well-developed and well-nourished.   HENT:   Head: Normocephalic and atraumatic.   Cardiovascular: Exam reveals no friction rub.   No murmur heard.  Pulmonary/Chest: Effort normal. She has wheezes.   Abdominal: Soft. Bowel sounds are normal. She exhibits no distension. There is no tenderness.   Neurological: She is alert.   Skin: Skin is warm and dry.       Results Review:    Glucose   Date Value Ref Range Status   03/12/2019 132 (H) 65 - 99 mg/dL Final   03/11/2019 98 65 - 99 mg/dL Final     Results from last 7 days   Lab Units 03/12/19  0336   WBC 10*3/mm3 6.34   HEMOGLOBIN g/dL 11.3*   HEMATOCRIT % 37.6   PLATELETS 10*3/mm3 381     Results from last 7 days   Lab Units 03/12/19  0336 03/11/19  1635   SODIUM mmol/L 142 146*   POTASSIUM mmol/L 4.3 3.4*   CHLORIDE mmol/L 106 106   CO2 mmol/L 22.5 27.6   BUN mg/dL 11 12   CREATININE mg/dL 0.73 0.72   CALCIUM mg/dL 8.0* 8.5*   BILIRUBIN mg/dL  --  0.4   ALK PHOS U/L  --  115   ALT (SGPT) U/L  --  16   AST (SGOT) U/L  --  11   GLUCOSE mg/dL 132* 98                 Cultures:  Blood Culture   Date Value Ref Range Status   03/08/2019 No growth at 3 days  Preliminary   03/08/2019 No growth at 3 days  Preliminary       I have reviewed daily medications and changes in CPOE    Scheduled meds    dextromethorphan polistirex ER 60 mg Oral Q12H   doxycycline 100 mg Oral Q12H   guaiFENesin 1,200 mg Oral Q12H   ipratropium-albuterol 3 mL  Nebulization 4x Daily - RT   meropenem 500 mg Intravenous Q6H   predniSONE 40 mg Oral Daily With Breakfast   sodium chloride 3 mL Intravenous Q12H   sodium chloride 3 mL Intravenous Q12H         Pharmacy to Dose meropenem (MERREM)     sodium chloride 75 mL/hr Last Rate: 75 mL/hr (03/12/19 0440)     PRN meds  •  acetaminophen  •  acetaminophen  •  ipratropium-albuterol  •  magnesium hydroxide  •  nitroglycerin  •  ondansetron  •  [DISCONTINUED] meropenem **AND** Pharmacy to Dose meropenem (MERREM)  •  sodium chloride  •  sodium chloride        PNA (pneumonia)    LUKAS (obstructive sleep apnea) not on CPAP    Nonischemic cardiomyopathy (CMS/HCC)    History of papillary adenocarcinoma of thyroid    Bronchospasm    Asthma    UTI (urinary tract infection) due to urinary indwelling catheter (CMS/HCC)    Presence of cardiac pacemaker    UTI due to extended-spectrum beta lactamase (ESBL) producing Escherichia coli    Immunocompromised (CMS/HCC)    UTI (urinary tract infection)        Assessment/Plan:    Ms. Schofield is a 53 y.o. immunocompromised with recent diagnosis of ESBL E. coli UTI and PNA on CTA 3/6. She has been on macrobid for UTI and started on clindamycin 3 days ago for pneumonia.  It is unclear if she has pneumonia versus post viral COPD exacerbation. Recent blood cultures were negative, she reports being afebrile for over a week, and no leukocytosis on labs 3/8. In addition, rheumatoid flare could be causing the ground glass appearance on CTA.     Possible pneumonia- pulmonary has started doxycycline, she is allergic to zithromax  · Admitted to monitored unit.  · CXR was ok  · IVFs @75 cc/hr   · Check CBC, CMP, procal ok, lactic acid normal, VRP negative and blood cultures negative  · Check rheumatoid factor high, crp 0.25, TSH 0.063  · If evidence of active PNA on above workup will start antibiotic. Urine antigens and sputum culture. Results likely unreliable d/t prior abx.  · She reports an allergy to  Zithromax  · Supplemental oxygen to keep SaO2 > 92%  · Increase steroids to prednisone 40  · Bronchodilators and antitussives as needed  · Physical therapy to see  ·      ESBL E.coli UTI- U/a is currently normal, dysuria on admission  · Diagnosed with culture 3/2.   · merrem iv changed to macrobid  · She has been previously seen for this by Dr Ellis, will ask them to see    CM- stable  H/o pacer- stable on monitor    DVT PPX: scd      Dakota Hernandez MD  03/12/19  7:18 AM

## 2019-03-12 NOTE — PLAN OF CARE
Problem: Patient Care Overview  Goal: Plan of Care Review  Outcome: Ongoing (interventions implemented as appropriate)   03/12/19 0246   Coping/Psychosocial   Plan of Care Reviewed With patient   OTHER   Outcome Summary Pt had minor c/o pain related to RA condition - treated with meds. Pt remained on Rm air, fever free, min. cough, continued IVF and received iv abx. No c/o SOB, VSS- will continue to monitor. 3/12/19 @0250   Plan of Care Review   Progress no change       Problem: Fall Risk (Adult)  Goal: Identify Related Risk Factors and Signs and Symptoms  Outcome: Outcome(s) achieved Date Met: 03/12/19    Goal: Absence of Fall  Outcome: Ongoing (interventions implemented as appropriate)      Problem: Breathing Pattern Ineffective (Adult)  Goal: Identify Related Risk Factors and Signs and Symptoms  Outcome: Outcome(s) achieved Date Met: 03/12/19    Goal: Effective Oxygenation/Ventilation  Outcome: Ongoing (interventions implemented as appropriate)

## 2019-03-12 NOTE — CONSULTS
Referring Provider: Dakota Hernandez MD  Reason for Consultation: No chief complaint on file.        Subjective   History of present illness:   This is a very nice 53-year-old woman Dr. Hernandez has asked us to evaluate and provide opinion regarding pneumonia with possible urinary tract infection. Per the patient, she was in her usual state of health until she underwent reverse total shoulder arthroplasty on the right shoulder on 01/08/2019. She says that typically after surgery she will develop a urinary tract infection and unfortunately several weeks later she started developing some burning at the end of urination. She went and saw her rheumatologist on 02/28/2019 and was prescribed levofloxacin. Unfortunately culture returned and was resistant to levofloxacin. I reviewed her past culture results and it looks like she grew ESBL E. coli on 03/02/2019 from the [employer] system that was susceptible to nitrofurantoin, piperacillin/tazobactam and gentamicin.  Urinalysis at that time did show high squamous epithelial cells but also significant white blood cells greater than 180 in the urine. Unfortunately, she started developing progressive cough and respiratory symptoms and had some chest congestion, just felt like she could not expectorate any sputum. This worsened and she was therefore evaluated by her cardiologist. CT scan was performed which was negative for PE but unfortunately showed a 15 mm ground glass opacity in the right upper lobe. She was therefore admitted on 03/11/2019. She has a history of RA and methotrexate and Arava were held. She has been started on meropenem for urinary tract infection but has not felt much better as of yet.       Past Medical History:   Diagnosis Date   • Anemia    • ASD (atrial septal defect)    • Asthma    • Asystole (CMS/[facility])     Post ASD closure asystole and high-grade AV block.  s/p pacemaker 5/11/10.   • Celiac disease    • DVT (deep venous thrombosis) (CMS/HCC)     Left  subclavian DVT following right atrial pacemaker lead revision in 2/14   • Edema    • Hashimoto's thyroiditis     s/p partial thyroidectomy 1/13/10   • Heart palpitations    • Hx of thyroid cancer    • Malignant neoplasm of thyroid gland (CMS/HCC)     Papillary carcinoma of the thyroid - s/p residual thyroidectomy in 2/11.   • Migraine    • Nonischemic cardiomyopathy (CMS/HCC)     EF as low as 35% 3/13.  EF 6/21/13 by CHANDANA was 50-55%, and 53% by echo on 6/17/14.  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  EF 50% by CHANDANA on 4/18/16.   • NSVT (nonsustained ventricular tachycardia) (CMS/HCC)     Noted by event recorder 9/12.  EP study by Dr. Aguilar on 11/20/12 showed no inducible VT.   • LUKAS on CPAP    • Papillary thyroid carcinoma (CMS/HCC) 08/27/2013   • Rheumatoid arthritis (CMS/HCC)     Severe and debilitating    • Secundum ASD     s/p open closure with a PeriPatch xenograft by Dr. Harper on 5/6/10 by Dr. Harper at [facility].   • Sleep apnea    • SOB (shortness of breath)     Multifactorial    • Thrombus due to any device, implant or graft     Extensive thrombus formation on the pacemaker leads by CHANDANA 3/25/13.  Initiated on Coumadin transiently at that time - improved significantly by CHANDANA 6/21/13.       Past Surgical History:   Procedure Laterality Date   • ASD AND VSD REPAIR     • CHOLECYSTECTOMY  2007   • ENDOSCOPY AND COLONOSCOPY  12/30/2015    Tom Orta MD   • EXCISION MASS TRUNK Right 7/20/2018    Procedure: EXCISION MASS TRUNK, RUQ ABD WALL LIPOMA;  Surgeon: Eduar Marx MD;  Location: [facility];  Service: General   • PACEMAKER IMPLANTATION  05/2010    Inital dual-chamber pacemaker placed 5/11/10 after ASD closure surgery.  Had LV lead placement and generator change on 8/19/13 by Dr. Aguilar ([employer] #[id number]).  Then had right atrial lead revision on 2/19/14 (for right atrial lead dislodgement and diaphragmatic stimulation) - developed left subclavian DVT afterwards.  Then had laser  lead extraction of the abandoned right atrial lead on 4/17/14.   • REPLACEMENT TOTAL KNEE BILATERAL     • THYROID LOBECTOMY Right 2012   • THYROIDECTOMY Left 2011    Dr. Coronado, radioactive iodine X3   • TONSILLECTOMY     • TRICUSPID VALVE SURGERY      #32 MC3 annuloplasty ring by Dr. Harper on 5/6/10 (at time of ASD closure)       Allergies   Allergen Reactions   • Ferumoxytol      Pt was hospitalized   • Azithromycin Rash     FAMILY HISTORY: No family history of infectious diseases.     SOCIAL HISTORY: She lives in [place], [place], and works as an assistant . She is a lifelong nonsmoker.       Medication:  Antibiotics:  Anti-Infectives (From admission, onward)    Ordered     Dose/Rate Route Frequency Start Stop    03/11/19 1701  meropenem (MERREM) 500mg/100 mL 0.9% NS IVPB (mbp)     Ordering Provider:  Dakota Hernandez MD    500 mg  over 3 Hours Intravenous Every 6 Hours 03/12/19 0000 03/18/19 2359    03/11/19 2014  doxycycline (MONODOX) capsule 100 mg     Ordering Provider:  Paras Roberts MD    100 mg Oral Every 12 Hours Scheduled 03/11/19 2130 03/16/19 2059 03/11/19 1701  meropenem (MERREM) 1 g/100 mL 0.9% NS VTB (mbp)     Ordering Provider:  Dakota Hernandez MD    1 g  over 30 Minutes Intravenous Once 03/11/19 1800 03/11/19 2058 03/11/19 1656  Pharmacy to Dose meropenem (MERREM)     Ordering Provider:  Dakota Hernandez MD     Does not apply Continuous PRN 03/11/19 1651 03/18/19 1650          Review of Systems  Pertinent items are noted in HPI, all other systems reviewed and negative    Objective     Physical Exam:   Vital Signs   Temp:  [97.8 °F (36.6 °C)-97.9 °F (36.6 °C)] 97.8 °F (36.6 °C)  Heart Rate:  [59-74] 74  Resp:  [14-20] 16  BP: (127-143)/(69-87) 133/73    GENERAL: Awake and alert, in no acute distress.   HEENT: Oropharynx is clear. Hearing is grossly normal.   EYES: PERRL. No conjunctival injection. No lid lag.   LYMPHATICS: No lymphadenopathy of the neck or inguinla  regions.   HEART: Regular rate and rhythm. No peripheral edema.   LUNGS:wheeze, coarse with normal respiratory effort.   GI: Soft, nontender, nondistended. No appreciable organomegaly.   SKIN: Warm and dry without cutaneous eruptions   PSYCHIATRIC: Appropriate mood, affect, insight, and judgment.     Results Review:       Lab Results   Component Value Date    WBC 6.34 03/12/2019    HGB 11.3 (L) 03/12/2019    HCT 37.6 03/12/2019    MCV 83.2 03/12/2019     03/12/2019           Lab Results   Component Value Date    GLUCOSE 132 (H) 03/12/2019    BUN 11 03/12/2019    CREATININE 0.73 03/12/2019    EGFRIFNONA 83 03/12/2019    BCR 15.1 03/12/2019    CO2 22.5 03/12/2019    CALCIUM 8.0 (L) 03/12/2019    ALBUMIN 3.80 03/11/2019    AST 11 03/11/2019    ALT 16 03/11/2019         Estimated Creatinine Clearance: 98.6 mL/min (by C-G formula based on SCr of 0.73 mg/dL).      Microbiology:  Bcx ngtd    Radiology:  Ct Chest personally interpreted, GGO in RUL    Assessment/Plan   1. COPD acute exacerbation-No overhwhelming PNA on CT and procal negative.  Favors viral bronchitis; oral doxycyline reasonable. Check legionella. RPP neg  2. ESBL UTI-improved but not completely resolved on nitrofurantoin. D/c merrem. Can cont for three more days. Longer convalesce d/t #3.  3. Immunosuppression from RA    D/w Dr Hernandez re above    Thank you for this consult.  We will continue to follow along and tailor antibiotics as the patient's clinical course evolves.      Cezar Guthrie MD  03/12/19  8:00 AM

## 2019-03-12 NOTE — PROGRESS NOTES
Discharge Planning Assessment  [facility]     Patient Name: Whitney Schofield  MRN: [id number]  Today's Date: 3/12/2019    Admit Date: 3/11/2019    Discharge Needs Assessment     Row Name 03/12/19 1717       Living Environment    Lives With  spouse    Current Living Arrangements  home/apartment/condo    Primary Care Provided by  self    Provides Primary Care For  no one    Family Caregiver if Needed  spouse       Resource/Environmental Concerns    Resource/Environmental Concerns  none    Transportation Concerns  car, none       Transition Planning    Patient/Family Anticipates Transition to  home with family    Transportation Anticipated  family or friend will provide       Discharge Needs Assessment    Readmission Within the Last 30 Days  no previous admission in last 30 days    Concerns to be Addressed  no discharge needs identified    Equipment Currently Used at Home  commode;walker, rolling    Anticipated Changes Related to Illness  none    Equipment Needed After Discharge  oxygen        Discharge Plan     Row Name 03/12/19 1718       Plan    Plan  Home with spouse assist    Patient/Family in Agreement with Plan  yes    Plan Comments  Facesheet information was verified with patient.  She uses a walker and BSC at home.  She prefers [place] if she needs home oxygen set up.  She lives with her spouse, Adan Schofield #[phone].  She would [facility].  Her PCP is Dmitri Schulte MD.  Her spouse will transport at [facility].  She declines rehab needs at this time.....................Mohini Munguia RN        Destination      No service coordination in this encounter.      Durable Medical Equipment      No service coordination in this encounter.      Dialysis/Infusion      No service coordination in this encounter.      Home Medical Care      No service coordination in this encounter.      Community Resources      No service coordination in this encounter.          Demographic Summary     Row Name 03/12/19 1710        General Information    Admission Type  inpatient    Arrived From  home    Referral Source  admission list    Preferred Language  English       Contact Information    Permission Granted to Share Info With  family/designee;    Contact Information Comments  Spouse, Adan Schofield [phone]        Functional Status     Row Name 03/12/19 6506       Functional Status    Usual Activity Tolerance  good    Current Activity Tolerance  moderate       Functional Status, IADL    Medications  independent    Meal Preparation  assistive equipment    Housekeeping  assistive equipment    Laundry  assistive equipment    Shopping  assistive equipment and person       Mental Status    General Appearance WDL  WDL       Employment/    Employment Status  employed full time        Psychosocial    No documentation.       Abuse/Neglect    No documentation.       Legal    No documentation.       Substance Abuse    No documentation.       Patient Forms    No documentation.           Mohini Munguia RN

## 2019-03-12 NOTE — PLAN OF CARE
Problem: Patient Care Overview  Goal: Plan of Care Review  Outcome: Ongoing (interventions implemented as appropriate)  Pt is currently on RA with o2 sats in low 90s. BS are rhonchi. Encouraging pt to cough and deep breathe. No changes at this time   03/12/19 5577   Coping/Psychosocial   Plan of Care Reviewed With patient

## 2019-03-12 NOTE — PLAN OF CARE
Problem: Patient Care Overview  Goal: Plan of Care Review  Outcome: Ongoing (interventions implemented as appropriate)   03/12/19 0465   Coping/Psychosocial   Plan of Care Reviewed With patient   OTHER   Outcome Summary no significant changes, lungs sound better, fluids going, pain med given x1, no other complaints, still coughing, vss, will continue to monirot, possible d/c tomorrow      Goal: Individualization and Mutuality  Outcome: Ongoing (interventions implemented as appropriate)    Goal: Discharge Needs Assessment  Outcome: Ongoing (interventions implemented as appropriate)    Goal: Interprofessional Rounds/Family Conf  Outcome: Ongoing (interventions implemented as appropriate)      Problem: Fall Risk (Adult)  Goal: Absence of Fall  Outcome: Ongoing (interventions implemented as appropriate)      Problem: Breathing Pattern Ineffective (Adult)  Goal: Effective Oxygenation/Ventilation  Outcome: Ongoing (interventions implemented as appropriate)    Goal: Anxiety/Fear Reduction  Outcome: Ongoing (interventions implemented as appropriate)

## 2019-03-12 NOTE — PLAN OF CARE
Problem: Patient Care Overview  Goal: Plan of Care Review   03/12/19 1015   OTHER   Outcome Summary Pt independent with mobility. No skilled acute care PT needs. RN and pt in agreement with dc from PT.

## 2019-03-13 VITALS
WEIGHT: 207.5 LBS | BODY MASS INDEX: 35.42 KG/M2 | RESPIRATION RATE: 16 BRPM | HEIGHT: 64 IN | SYSTOLIC BLOOD PRESSURE: 149 MMHG | HEART RATE: 59 BPM | TEMPERATURE: 98.4 F | OXYGEN SATURATION: 97 % | DIASTOLIC BLOOD PRESSURE: 87 MMHG

## 2019-03-13 LAB
ANION GAP SERPL CALCULATED.3IONS-SCNC: 10.8 MMOL/L
BACTERIA SPEC AEROBE CULT: NORMAL
BACTERIA SPEC AEROBE CULT: NORMAL
BASOPHILS # BLD AUTO: 0.01 10*3/MM3 (ref 0–0.2)
BASOPHILS NFR BLD AUTO: 0.1 % (ref 0–1.5)
BUN BLD-MCNC: 15 MG/DL (ref 6–20)
BUN/CREAT SERPL: 23.1 (ref 7–25)
CALCIUM SPEC-SCNC: 8 MG/DL (ref 8.6–10.5)
CHLORIDE SERPL-SCNC: 109 MMOL/L (ref 98–107)
CO2 SERPL-SCNC: 24.2 MMOL/L (ref 22–29)
CREAT BLD-MCNC: 0.65 MG/DL (ref 0.57–1)
DEPRECATED RDW RBC AUTO: 47.2 FL (ref 37–54)
EOSINOPHIL # BLD AUTO: 0.01 10*3/MM3 (ref 0–0.4)
EOSINOPHIL NFR BLD AUTO: 0.1 % (ref 0.3–6.2)
ERYTHROCYTE [DISTWIDTH] IN BLOOD BY AUTOMATED COUNT: 15.5 % (ref 12.3–15.4)
GFR SERPL CREATININE-BSD FRML MDRD: 95 ML/MIN/1.73
GLUCOSE BLD-MCNC: 99 MG/DL (ref 65–99)
HCT VFR BLD AUTO: 33.4 % (ref 34–46.6)
HGB BLD-MCNC: 10.1 G/DL (ref 12–15.9)
IMM GRANULOCYTES # BLD AUTO: 0.07 10*3/MM3 (ref 0–0.05)
IMM GRANULOCYTES NFR BLD AUTO: 0.9 % (ref 0–0.5)
LYMPHOCYTES # BLD AUTO: 1.3 10*3/MM3 (ref 0.7–3.1)
LYMPHOCYTES NFR BLD AUTO: 15.8 % (ref 19.6–45.3)
MCH RBC QN AUTO: 25.5 PG (ref 26.6–33)
MCHC RBC AUTO-ENTMCNC: 30.2 G/DL (ref 31.5–35.7)
MCV RBC AUTO: 84.3 FL (ref 79–97)
MONOCYTES # BLD AUTO: 0.71 10*3/MM3 (ref 0.1–0.9)
MONOCYTES NFR BLD AUTO: 8.6 % (ref 5–12)
NEUTROPHILS # BLD AUTO: 6.11 10*3/MM3 (ref 1.4–7)
NEUTROPHILS NFR BLD AUTO: 74.5 % (ref 42.7–76)
NRBC BLD AUTO-RTO: 0 /100 WBC (ref 0–0)
PLATELET # BLD AUTO: 295 10*3/MM3 (ref 140–450)
PMV BLD AUTO: 9.5 FL (ref 6–12)
POTASSIUM BLD-SCNC: 3.8 MMOL/L (ref 3.5–5.2)
RBC # BLD AUTO: 3.96 10*6/MM3 (ref 3.77–5.28)
SODIUM BLD-SCNC: 144 MMOL/L (ref 136–145)
WBC NRBC COR # BLD: 8.21 10*3/MM3 (ref 3.4–10.8)

## 2019-03-13 PROCEDURE — 85025 COMPLETE CBC W/AUTO DIFF WBC: CPT | Performed by: HOSPITALIST

## 2019-03-13 PROCEDURE — 80048 BASIC METABOLIC PNL TOTAL CA: CPT | Performed by: HOSPITALIST

## 2019-03-13 PROCEDURE — 94799 UNLISTED PULMONARY SVC/PX: CPT

## 2019-03-13 PROCEDURE — 99232 SBSQ HOSP IP/OBS MODERATE 35: CPT | Performed by: INTERNAL MEDICINE

## 2019-03-13 PROCEDURE — 63710000001 PREDNISONE PER 1 MG: Performed by: NURSE PRACTITIONER

## 2019-03-13 RX ORDER — LEVOTHYROXINE SODIUM 0.12 MG/1
250 TABLET ORAL
Status: DISCONTINUED | OUTPATIENT
Start: 2019-03-14 | End: 2019-03-13 | Stop reason: HOSPADM

## 2019-03-13 RX ORDER — PREDNISONE 20 MG/1
20 TABLET ORAL 2 TIMES DAILY
Qty: 6 TABLET | Refills: 0 | Status: SHIPPED | OUTPATIENT
Start: 2019-03-13 | End: 2019-03-16

## 2019-03-13 RX ORDER — IPRATROPIUM BROMIDE AND ALBUTEROL SULFATE 2.5; .5 MG/3ML; MG/3ML
3 SOLUTION RESPIRATORY (INHALATION) EVERY 4 HOURS PRN
Qty: 360 ML | Refills: 0 | Status: SHIPPED | OUTPATIENT
Start: 2019-03-13

## 2019-03-13 RX ORDER — NITROFURANTOIN 25; 75 MG/1; MG/1
100 CAPSULE ORAL EVERY 12 HOURS SCHEDULED
Qty: 3 CAPSULE | Refills: 0 | Status: SHIPPED | OUTPATIENT
Start: 2019-03-13 | End: 2019-03-15

## 2019-03-13 RX ORDER — DOXYCYCLINE 100 MG/1
100 CAPSULE ORAL EVERY 12 HOURS SCHEDULED
Qty: 7 CAPSULE | Refills: 0 | Status: SHIPPED | OUTPATIENT
Start: 2019-03-13 | End: 2019-03-17

## 2019-03-13 RX ADMIN — IPRATROPIUM BROMIDE AND ALBUTEROL SULFATE 3 ML: 2.5; .5 SOLUTION RESPIRATORY (INHALATION) at 10:35

## 2019-03-13 RX ADMIN — SODIUM CHLORIDE 4 ML: 7 NEBU SOLN,3 % NEBU at 06:48

## 2019-03-13 RX ADMIN — DEXTROMETHORPHAN POLISTIREX 60 MG: 30 SUSPENSION ORAL at 09:04

## 2019-03-13 RX ADMIN — PREDNISONE 40 MG: 20 TABLET ORAL at 09:01

## 2019-03-13 RX ADMIN — SODIUM CHLORIDE 75 ML/HR: 900 INJECTION INTRAVENOUS at 01:35

## 2019-03-13 RX ADMIN — SODIUM CHLORIDE, PRESERVATIVE FREE 3 ML: 5 INJECTION INTRAVENOUS at 09:01

## 2019-03-13 RX ADMIN — GUAIFENESIN 1200 MG: 600 TABLET, EXTENDED RELEASE ORAL at 06:26

## 2019-03-13 RX ADMIN — HYDROCODONE BITARTRATE AND ACETAMINOPHEN 1 TABLET: 10; 325 TABLET ORAL at 09:05

## 2019-03-13 RX ADMIN — DOXYCYCLINE 100 MG: 100 CAPSULE ORAL at 09:01

## 2019-03-13 RX ADMIN — IPRATROPIUM BROMIDE AND ALBUTEROL SULFATE 3 ML: 2.5; .5 SOLUTION RESPIRATORY (INHALATION) at 06:48

## 2019-03-13 RX ADMIN — NITROFURANTOIN MONOHYDRATE/MACROCRYSTALLINE 100 MG: 25; 75 CAPSULE ORAL at 09:01

## 2019-03-13 NOTE — DISCHARGE SUMMARY
[facility]  [phone]    DISCHARGE SUMMARY  [facility]    Patient Identification:  Name: Whitney Schofield  Age: 53 y.o.  Sex: female  :  1965  MRN: [id number]  Primary Care Physician: Dmitri Schulte MD    Admit date: 3/11/2019  Discharge date and time:      Discharge Diagnoses:  PNA (pneumonia)    LUKAS (obstructive sleep apnea) not on CPAP    Nonischemic cardiomyopathy (CMS/HCC)    History of papillary adenocarcinoma of thyroid    Bronchospasm    Asthma    UTI (urinary tract infection) due to urinary indwelling catheter (CMS/HCC)    Presence of cardiac pacemaker    UTI due to extended-spectrum beta lactamase (ESBL) producing Escherichia coli    Immunocompromised (CMS/[facility])    UTI (urinary tract infection)    Pneumonia       History of present illness from H&P:  Ms. Schofield is a 53 y.o. non-smoker with a history of RA secondary Sjogren's, h/o papillary thyroid cancer s/p thyroidectomy, permanent pacemaker placement, nonischemic cardiomyopathy, LUKAS not on CPAP, and asthma that presents to [facility] complaining of several weeks of a URI and UTI.  She had a right shoulder surgery .  Shortly after her surgery, she developed dysuria and frequency consistent with her history of recurrent UTIs.  Approximately,  she developed cough, congestion, and fever (T-max 102).  Patient reported symptoms to her rheumatologist during an OV on . He diagnosed her with a URI and UTI. She was pescribed Levaquin but changed to nitrofurantoin when urine culture resultedwith an ESBL ecoli. Pt's WBC was normal on .  Rheumatologist also recommended stopping methotrexate and Arava due to active infection.  Her symptoms progressively worsened with persistent nonproductive cough, wheezing, and congestion.  Shortness of breath that is worse with exertion, talking and at night. She has pleuritic chest pain worse with deep breathe.  She is not on  CPAP for history of LUKAS but states her oxygen saturations were dropping to 88-89%.  She does not think she has had a fever for over a week. Associated symptoms include malaise, decreased appetite, chills.  She was seen by cardiology and on March 6 had a CTA chest which was negative for PE but demonstrated small approximately 15 mm diameter groundglass nodular opacity in the right upper lobe.  She was seen by her PCP on 3/8/19.  Dr. Yeboah started her on clindamycin, Tessalon, Medrol Dosepak and Breo inhalers.  She was also instructed to continue Macrobid for her UTI.  Labs from 3/8/19 include blood cultures without growth, normal WBC, and unremarkable BMP. Over the weekend, she continued to have severe coughing and dyspnea along with dysuria.   Per EMR records, PFTs from October 4, 2017 the patient had an FEV1-FVC of 85, FVC of 67%, DLCO of 73%. Moderate airway obstruction which is not reversible with broncho-dilators. Last stress test w/ EF = 59%.          Hospital Course:     Patient was admitted to the hospital and found to have groundglass opacity in the right upper lobe and concern for possible pneumonia.  The patient has been started on oral doxycycline.  Prior to hospitalization she was found to have urinary tract infection which seems to be improved.  Her symptoms have improved.  She will take just couple more days of Macrobid on discharge.  Patient was seen during the hospitalization by both infectious disease and pulmonary.  The patient continues to have wheezing which is thought to be from possible viral bronchitis however viral respiratory panel was negative.    Further hospitalization by problem list:    COPD AE/ viral bronchitis   · Admitted to monitored unit.  · CXR was unremarkable  · Oral doxycycline   · Dilutional decline in hgb. Stop IVFs with good po intake   · Supplemental oxygen to keep SaO2 > 92%  · Continue Bronchodilators and steroids    · Physical therapy following   · Per pulmonology  "note: \"Needs nebulizer for home; quick outpatient follow up with LPC in 2-4 weeks with spirometry; repeat ct chest in 3 months time; start advair hfa 115 two puffs bid on d/c.\"     ESBL E.coli UTI- U/a is currently normal, dysuria on admission  · Diagnosed with culture 3/2.   ·  Macrobid 3 more days give immunosuppression      Rheumatoid Arthritis-  Hold methotrexate and arava until feeling better and okay to restart from her rheumatologist    Nocturnal hypoxemia O2 sats down to his low is 82%.  We will arrange for overnight oximetry test tonight at home      The patient was seen and examined on the day of discharge.    Consults:   Consults     Date and Time Order Name Status Description    3/11/2019 1656 Inpatient Infectious Diseases Consult Completed     3/11/2019 1650 Inpatient Pulmonology Consult Completed           Results from last 7 days   Lab Units 03/13/19  0322   WBC 10*3/mm3 8.21   HEMOGLOBIN g/dL 10.1*   HEMATOCRIT % 33.4*   PLATELETS 10*3/mm3 295       Results from last 7 days   Lab Units 03/13/19  0322   SODIUM mmol/L 144   POTASSIUM mmol/L 3.8   CHLORIDE mmol/L 109*   CO2 mmol/L 24.2   BUN mg/dL 15   CREATININE mg/dL 0.65   GLUCOSE mg/dL 99   CALCIUM mg/dL 8.0*       Significant Diagnostic Studies:   Lab Results   Component Value Date    WBC 8.21 03/13/2019    HGB 10.1 (L) 03/13/2019    HCT 33.4 (L) 03/13/2019     03/13/2019     Lab Results   Component Value Date     03/13/2019    K 3.8 03/13/2019     (H) 03/13/2019    CO2 24.2 03/13/2019    BUN 15 03/13/2019    CREATININE 0.65 03/13/2019    GLUCOSE 99 03/13/2019     Lab Results   Component Value Date    CALCIUM 8.0 (L) 03/13/2019     Lab Results   Component Value Date    AST 11 03/11/2019    ALT 16 03/11/2019    ALKPHOS 115 03/11/2019     No results found for: APTT, INR  Lab Results   Component Value Date    COLORU Yellow 03/11/2019    CLARITYU Clear 03/11/2019    PHUR 6.5 03/11/2019    GLUCOSEU Negative 03/11/2019    KETONESU " Negative 03/11/2019    BLOODU Negative 03/11/2019    LEUKOCYTESUR Negative 03/11/2019    BILIRUBINUR Negative 03/11/2019    UROBILINOGEN 0.2 E.U./dL 03/11/2019     No results found for: TROPONINT, TROPONINI, BNP  No components found for: HGBA1C;2  No components found for: TSH;2    Imaging Results (all)     Procedure Component Value Units Date/Time    XR Chest PA & Lateral [[id number]] Collected:  03/11/19 1543     Updated:  03/11/19 1549    Narrative:       XR CHEST PA AND LATERAL-     HISTORY: Female who is 53 years-old,  pneumonia     TECHNIQUE: Frontal and lateral views of the chest     COMPARISON: 2/26/2016. Correlated with CT from 3/6/2019.     FINDINGS: Heart size is borderline. Left-sided pacemaker and cardiac  leads. Sternotomy wires are seen. Pulmonary vasculature is unremarkable.  The recently CT demonstrated groundglass density of the right upper lobe  is not well characterized radiographically, follow-up CT is again  recommended to characterize resolution and to exclude possibility of an  underlying lesion. No new areas of infiltrate are noted. No pleural  effusion, or pneumothorax. No acute osseous process.       Impression:       The recently CT demonstrated groundglass density of the  right upper lobe is not well characterized radiographically, follow-up  CT is again recommended to characterize resolution and to exclude  possibility of an underlying lesion.      This report was finalized on 3/11/2019 3:46 PM by Dr. Fazal Gregorio M.D.         No results found for: SITE, ALLENTEST, PHART, SFA6VCK, PO2ART, IUE5OZP, BASEEXCESS, U3MLXXVY, HGBBG, HCTABG, OXYHEMOGLOBI, METHHGBN, CARBOXYHGB, CO2CT, BAROMETRIC, MODALITY, FIO2       Discharge Medications      New Medications      Instructions Start Date   doxycycline 100 MG capsule  Commonly known as:  MONODOX   100 mg, Oral, Every 12 Hours Scheduled      fluticasone-salmeterol 115-21 MCG/ACT inhaler  Commonly known as:  ADVAIR HFA   2 puffs, Inhalation, 2  Times Daily - RT      ipratropium-albuterol 0.5-2.5 mg/3 ml nebulizer  Commonly known as:  DUO-NEB   3 mL, Nebulization, Every 4 Hours PRN      nitrofurantoin (macrocrystal-monohydrate) 100 MG capsule  Commonly known as:  MACROBID   100 mg, Oral, Every 12 Hours Scheduled      predniSONE 20 MG tablet  Commonly known as:  DELTASONE   20 mg, Oral, 2 Times Daily         Continue These Medications      Instructions Start Date   albuterol sulfate  (90 Base) MCG/ACT inhaler  Commonly known as:  PROVENTIL HFA;VENTOLIN HFA;PROAIR HFA   2 puffs, Inhalation, Every 4 Hours PRN, 2 puffs every 4 hours when necessary dyspnea      carvedilol 3.125 MG tablet  Commonly known as:  COREG   3.125 mg, Oral, 2 Times Daily PRN, DEPENDING ON BP.       Ferrous Sulfate Dried 200 (65 Fe) MG tablet tablet   200 mg, Oral      folic acid 1 MG tablet  Commonly known as:  FOLVITE   1 mg, Oral, Daily      HYDROcodone-acetaminophen  MG per tablet  Commonly known as:  NORCO   1 tablet, Oral, Every 6 Hours PRN      levothyroxine 300 MCG tablet  Commonly known as:  SYNTHROID, LEVOTHROID   Oral, Daily, Takes 227 mcg total daily      losartan 25 MG tablet  Commonly known as:  COZAAR   25 mg, Oral, Daily PRN      omeprazole 40 MG capsule  Commonly known as:  priLOSEC   40 mg, Oral, Daily      vitamin D 63559 units capsule capsule  Commonly known as:  ERGOCALCIFEROL   50,000 Units, Oral, Weekly, SUNDAY          Stop These Medications    amoxicillin 500 MG capsule  Commonly known as:  AMOXIL     clindamycin 300 MG capsule  Commonly known as:  CLEOCIN     methotrexate 2.5 MG tablet     methylPREDNISolone 4 MG tablet  Commonly known as:  MEDROL (CAPO)     potassium chloride 10 MEQ CR capsule  Commonly known as:  MICRO-K     traZODone 50 MG tablet  Commonly known as:  DESYREL              Patient Instructions:       No future appointments.     Additional Instructions for the Follow-ups that You Need to Schedule     Follow up with Paras Roberts MD,  pulmonology in 2-3 weeks  [phone]             Follow-up Information     Dmitri Schulte MD .    Specialty:  Family Medicine  Contact information:  [address] [place] [phone]                   Discharge Order (From admission, onward)    Start     Ordered    03/13/19 1128  Discharge patient  Once     Expected Discharge Date:  03/13/19    Discharge Disposition:  Home or Self Care    Physician of Record for Attribution - Please select from Treatment Team:  DAKOTA HERNANDEZ [[id number]]    Review needed by CMO to determine Physician of Record:  No       Question Answer Comment   Physician of Record for Attribution - Please select from Treatment Team DAKOTA HERNANDEZ    Review needed by CMO to determine Physician of Record No        03/13/19 1130          Discharge instructions:  Follow up with your primary care provider in 1-2 weeks with a cbc and cmp     Total time spent discharging patient including evaluation, post hospitalization follow up,  medication and post hospitalization instructions and education, total time exceeds 30 minutes.    Signed:  Dakota Hernandez MD  3/13/2019  12:44 PM

## 2019-03-13 NOTE — PROGRESS NOTES
INFECTIOUS DISEASES PROGRESS NOTE    CC:f/u ESBL UT    S:   Breathing is much better.  She is having fevers or chills or night sweats.  No significant dysuria    O:  Physical Exam:  Temp:  [98.2 °F (36.8 °C)-98.6 °F (37 °C)] 98.4 °F (36.9 °C)  Heart Rate:  [59-80] 59  Resp:  [16-18] 16  BP: (131-161)/(65-88) 149/87  Physical Exam  No acute distress  Abdomen soft nontender nondistended  Pulmonary effort normal.  She does have some coarse breath sounds but no pronounced wheeze     Diagnostics:     Cr 0.65  WBC 8.2 (p75, L 16, M 9)  H/H 10/33      Assessment/Plan   1. COPD acute exacerbation-No overhwhelming PNA on CT and procal negative.  Favors viral bronchitis; oral doxycyline reasonable with antiinflammatory effects. Legionella and RPP neg  2. ESBL UTI-improved but not completely resolved on nitrofurantoin. D/c merrem. Can cont for two more days. Longer convalesce d/t #3.  3. Immunosuppression from RA    Plan as above.  Finish course of doxy and nitrofurantoin.  No objection to discharge when okay with others.  We will be happy to reevaluate should infectious concerns evolve    Cezar Guthrie MD  8:21 AM  03/13/19

## 2019-03-13 NOTE — PLAN OF CARE
Problem: Patient Care Overview  Goal: Plan of Care Review  Outcome: Ongoing (interventions implemented as appropriate)   03/13/19 0206   Coping/Psychosocial   Plan of Care Reviewed With patient   OTHER   Outcome Summary Pt had c/o pain in her hands- treated x1, received fluids, rested intermittently, VSS - will continue to monitor. 3/13/19 @0211   Plan of Care Review   Progress no change       Problem: Fall Risk (Adult)  Goal: Absence of Fall  Outcome: Ongoing (interventions implemented as appropriate)      Problem: Breathing Pattern Ineffective (Adult)  Goal: Effective Oxygenation/Ventilation  Outcome: Ongoing (interventions implemented as appropriate)    Goal: Anxiety/Fear Reduction  Outcome: Ongoing (interventions implemented as appropriate)

## 2019-03-13 NOTE — PLAN OF CARE
Problem: Patient Care Overview  Goal: Plan of Care Review  Outcome: Ongoing (interventions implemented as appropriate)   03/13/19 1460   Coping/Psychosocial   Plan of Care Reviewed With patient   Plan of Care Review   Progress no change

## 2019-03-13 NOTE — PROGRESS NOTES
[facility]      Mar/chart reviewed  F/u copd with ae.  Some continued cough and shortness of breath  No chest pain    Vital Sign Min/Max for last 24 hours  Temp  Min: 98.2 °F (36.8 °C)  Max: 98.6 °F (37 °C)   BP  Min: 131/65  Max: 161/88   Pulse  Min: 59  Max: 80   Resp  Min: 16  Max: 18   SpO2  Min: 90 %  Max: 97 %   Flow (L/min)  Min: 1  Max: 1   Weight  Min: 94.1 kg (207 lb 8 oz)  Max: 94.1 kg (207 lb 8 oz)     Appears ill, axox3,   perrl, eomi, no icterus,  mmm, no jvd, trachea midline, neck supple,  chest good ae, a little coarse bilaterally, no crackles, no wheezes, --better ae today  rrr,   soft, nt, nd +bs,  no c/c/ e     Labs:  Cr 0.65  Na 144  Bicarb 24  Wbc 8.2  hgb 10  plts 295    Micro: negative    A/P:  1. 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 She should have outpatient alpha one testing. Probably she should be on maintence inhalers. Bronchodilators, steroids  2. Acute bronchitis and likely pneumonia -- would treat atypicals and pulm toilet  3. Immunosuppressed patient -- agree with holding methotrexate and arava  4. LUKAS -- resume cpap when able  5. UTI  6. Hypokalemia -- better    Needs nebulizer for home; quick outpatient follow up with LPC in 2-4 weeks with spirometry; repeat ct chest in 3 months time; start advair hfa 115 two puffs bid on d/c.    D/w RN

## 2019-03-13 NOTE — PLAN OF CARE
Problem: Fall Risk (Adult)  Goal: Absence of Fall  Outcome: Ongoing (interventions implemented as appropriate)      Problem: Breathing Pattern Ineffective (Adult)  Goal: Effective Oxygenation/Ventilation  Outcome: Ongoing (interventions implemented as appropriate)    Goal: Anxiety/Fear Reduction  Outcome: Ongoing (interventions implemented as appropriate)

## 2019-03-13 NOTE — PROGRESS NOTES
Discharge Planning Assessment  [facility]     Patient Name: Whitney Schofield  MRN: [id number]  Today's Date: 3/13/2019    Admit Date: 3/11/2019    Discharge Needs Assessment    No documentation.       Discharge Plan     Row Name 03/13/19 1415       Plan    Plan  Home with spouse assist and transport    Patient/Family in Agreement with Plan  yes    Plan Comments  Patient prefers [place] [facility] for DME and overnight oximetry testing.  Abigail to deliver nebulizer.  Plans home with spouse to transport.........................Mohini Munguia RN    Row Name 03/13/19 1300       Plan    Plan  Home with spouse assist    Patient/Family in Agreement with Plan  yes    Plan Comments  Awaiting DME orders for nebulizer and to address patient's need for oxygen at HS......................Mohini Munguia RN        Destination      No service coordination in this encounter.      Durable Medical Equipment      No service coordination in this encounter.      Dialysis/Infusion      No service coordination in this encounter.      Home Medical Care      No service coordination in this encounter.      Community Resources      No service coordination in this encounter.        Expected Discharge Date and Time     Expected Discharge Date Expected Discharge Time    Mar 13, 2019         Demographic Summary    No documentation.       Functional Status    No documentation.       Psychosocial    No documentation.       Abuse/Neglect    No documentation.       Legal    No documentation.       Substance Abuse    No documentation.       Patient Forms    No documentation.           Mohini Munguia RN

## 2019-03-13 NOTE — PLAN OF CARE
Problem: Patient Care Overview  Goal: Plan of Care Review  Outcome: Ongoing (interventions implemented as appropriate)   03/13/19 1149   Coping/Psychosocial   Plan of Care Reviewed With patient   OTHER   Outcome Summary patient is being discharged      Goal: Individualization and Mutuality  Outcome: Ongoing (interventions implemented as appropriate)    Goal: Discharge Needs Assessment  Outcome: Ongoing (interventions implemented as appropriate)    Goal: Interprofessional Rounds/Family Conf  Outcome: Ongoing (interventions implemented as appropriate)      Problem: Fall Risk (Adult)  Goal: Absence of Fall  Outcome: Ongoing (interventions implemented as appropriate)      Problem: Breathing Pattern Ineffective (Adult)  Goal: Effective Oxygenation/Ventilation  Outcome: Ongoing (interventions implemented as appropriate)    Goal: Anxiety/Fear Reduction  Outcome: Ongoing (interventions implemented as appropriate)

## 2019-03-14 ENCOUNTER — READMISSION MANAGEMENT (OUTPATIENT)
Dept: CALL CENTER | Facility: HOSPITAL | Age: 54
End: 2019-03-14

## 2019-03-14 NOTE — OUTREACH NOTE
Prep Survey      Responses   Facility patient discharged from?  [facility]   Is patient eligible?  Yes   Discharge diagnosis  PNA  UTI   Does the patient have one of the following disease processes/diagnoses(primary or secondary)?  COPD/Pneumonia   Does the patient have Home health ordered?  No   Is there a DME ordered?  No   Prep survey completed?  Yes          Abigail Truong RN

## 2019-03-14 NOTE — PROGRESS NOTES
Case Management Discharge Note    Final Note: Home and patient declines home oxygen at night  and testing at this time per Abigail/ Tim    Destination      No service has been selected for the patient.      Durable Medical Equipment - Selection Complete      Service Provider Request Status Selected Services Address Phone Number Fax Number    [facility] [address] [address] [phone]      Dialysis/Infusion      No service has been selected for the patient.      Home Medical Care      No service has been selected for the patient.      Community Resources      No service has been selected for the patient.        Transportation Services  Private: Car    Final Discharge Disposition Code: 01 - home or self-care

## 2019-03-16 ENCOUNTER — READMISSION MANAGEMENT (OUTPATIENT)
Dept: CALL CENTER | Facility: HOSPITAL | Age: 54
End: 2019-03-16

## 2019-03-16 NOTE — OUTREACH NOTE
COPD/PN Week 1 Survey      Responses   Facility patient discharged from?  [facility]   Does the patient have one of the following disease processes/diagnoses(primary or secondary)?  COPD/Pneumonia   Is there a successful TCM telephone encounter documented?  No   Was the primary reason for admission:  Pneumonia   Week 1 attempt successful?  No   Unsuccessful attempts  Attempt 1          Yanni Garcia RN

## 2019-03-18 ENCOUNTER — READMISSION MANAGEMENT (OUTPATIENT)
Dept: CALL CENTER | Facility: HOSPITAL | Age: 54
End: 2019-03-18

## 2019-03-18 NOTE — OUTREACH NOTE
COPD/PN Week 1 Survey      Responses   Facility patient discharged from?  [facility]   Does the patient have one of the following disease processes/diagnoses(primary or secondary)?  COPD/Pneumonia   Is there a successful TCM telephone encounter documented?  No   Week 1 attempt successful?  Yes   Call start time  0946   Revoke  Decline to participate   Call end time  0946   Discharge diagnosis  PNA  UTI          Vee Collado, RN

## 2019-03-19 DIAGNOSIS — J18.9 PNEUMONIA, UNSPECIFIED ORGANISM: Primary | ICD-10-CM

## 2019-03-19 NOTE — PROGRESS NOTES
Date of Office Visit: 2019  Encounter Provider: Lion Marques MD  Place of Service: [facility] CARDIOLOGY  Patient Name: Whitney Schofield  :1965    Chief complaint: Follow-up for open ASD closure, nonischemic cardiomyopathy,   chronic combined CHF, tricuspid valve repair, and pacemaker placement.    History of Present Illness:      I again had the pleasure of seeing your patient in cardiology office on 3/6/2019. As  you well know, she is a very pleasant 53 year old white female with a past history  significant for multiple cardiovascular issues who presents for follow-up. The patient   originally presented to [facility] with symptoms of palpitations and  shortness of breath in 2009. She subsequently was diagnosed with Hashimoto  thyroiditis, and underwent a partial thyroidectomy in 2010. As part of a routine  follow-up, she had a two-dimensional echocardiogram performed on 2010 which   revealed a severely dilated right ventricle and significant pulmonary hypertension. A CT   scan of her chest did not show any evidence for pulmonary embolus or other acute lung   pathology.  She underwent a transesophageal echocardiogram on 2010 which   revealed a large secundum ASD high in the atrial septum. 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 She was referred to   Dr. Harper for evaluation to surgically repair the ASD, and underwent a preoperative   cardiac catheterization on 2010. This revealed angiographically normal coronary   arteries, with a Qp:Qs of 2.3, consistent with a large shunt. The patient underwent   closure of the ASD, as well as tricuspid valve repair with an annuloplasty ring by Dr. Harper at [facility] on 2010. Her postoperative period was complicated by   complete AV block and accelerated junctional escape rhythm. She underwent   placement of a permanent  "pacemaker on 05/11/2010.      The patient continued to have significant shortness of breath and a 24-hour Holter   monitor and two-dimensional echocardiogram were obtained on 05/12/2011. Her   Holter monitor did show several brief episodes of tachycardia. The echocardiogram   did not show any significant findings other than a \"shuttering motion\" of the septum.   She underwent a right and left heart catheterization on 07/20/2011 to assess for any   evidence of constriction. There was no evidence of restriction or constriction on the   heart catheterization, although she did have pulmonary hypertension with a mean   pulmonary artery pressure of 30 mmHg. A transesophageal echocardiogram was also   performed on 06/20/2011 which revealed an ejection fraction of 55-60%, and an intact   ASD repair. An electrophysiology study was performed by Dr. Foster on 08/01/2011.   This was performed through her pacemaker in conjunction with an isoproterenol   infusion. This showed no inducible tachycardia or ventricular tachycardia.       The patient eventually wore a 21-day event recorder and was noted to go into   wide-complex tachycardia on 09/30/2012. She had multiple other episodes as well.   Dr. Aguilar from Electrophysiology saw the patient and felt this represented ventricular   tachycardia. She did ultimately undergo a repeat electrophysiology study on   11/20/2012 by Dr. Aguilar which confirmed complete infra-hisian block with no   conduction retrograde or antegrade. No ventricular tachycardia was induced. She   was started on beta blockers at that time.       At the patient's visit in 03/2013 she complained of severe volume retention and edema.    She was also more short of breath and had gained a significant amount of weight.   She was admitted to the hospital on 03/25/2013 and underwent a transesophageal   echocardiogram.  Her ejection fraction had decreased significantly to 35%, and there   was an extensive amount of thrombus " formation on her pacemaker wires. Her ASD   repair appeared to be in good condition. She did undergo a CT scan of the chest   which showed no evidence for pulmonary embolism, as well as a CT scan of the   abdomen which showed no evidence for inferior vena cava obstruction or clotting.   She was diuresed extensively and placed on Coumadin at that time. 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 She ultimately underwent left   ventricular lead placement and pacemaker replacement on 08/19/2013 by Dr. Aguilar.   She then had right atrial lead dislodgement and diaphragmatic stimulation. A right   atrial lead revision was performed on 02/19/2014; however, the patient developed a   significant left subclavian vein thrombosis post procedurally. She again was placed   on Coumadin at that time, which was then switched to Xarelto. She ultimately   underwent laser lead extraction of the abandoned right atrial lead on 04/17/2014. She   did undergo a right heart cath on 04/13/2015 which showed only mild pulmonary   hypertension (mean PA pressure 29 mm Hg).      The patient presents today for follow-up.  She has had multiple issues, including   congestion and coughing, as well as shortness of breath and wheezing with   exertion.  She had a fever several days ago.  She is on antibiotics for an E. coli   infection in her bladder.  She has not had any chest pain or significant palpitations.      Past Medical History:   Diagnosis Date   • Anemia    • ASD (atrial septal defect)    • Asthma    • Asystole (CMS/HCC)     Post ASD closure asystole and high-grade AV block.  s/p pacemaker 5/11/10.   • Celiac disease    • DVT (deep venous thrombosis) (CMS/HCC)     Left subclavian DVT following right atrial pacemaker lead revision in 2/14   • Edema    • Hashimoto's thyroiditis     s/p partial thyroidectomy 1/13/10   • Heart palpitations    • Hx of thyroid cancer    • Malignant neoplasm of thyroid gland (CMS/HCC)      Papillary carcinoma of the thyroid - s/p residual thyroidectomy in 2/11.   • Migraine    • Nonischemic cardiomyopathy (CMS/HCC)     EF as low as 35% 3/13.  EF 6/21/13 by CHANDANA was 50-55%, and 53% by echo on 6/17/14.  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  EF 50% by CHANDANA on 4/18/16.   • NSVT (nonsustained ventricular tachycardia) (CMS/HCC)     Noted by event recorder 9/12.  EP study by Dr. Aguilar on 11/20/12 showed no inducible VT.   • LUKAS on CPAP    • Papillary thyroid carcinoma (CMS/HCC) 08/27/2013   • Rheumatoid arthritis (CMS/HCC)     Severe and debilitating    • Secundum ASD     s/p open closure with a PeriPatch xenograft by Dr. Harper on 5/6/10 by Dr. Harper at [facility].   • Sleep apnea    • SOB (shortness of breath)     Multifactorial    • Thrombus due to any device, implant or graft     Extensive thrombus formation on the pacemaker leads by CHANDANA 3/25/13.  Initiated on Coumadin transiently at that time - improved significantly by CHANDANA 6/21/13.       Past Surgical History:   Procedure Laterality Date   • ASD AND VSD REPAIR     • CHOLECYSTECTOMY  2007   • ENDOSCOPY AND COLONOSCOPY  12/30/2015    Tom Orta MD   • EXCISION MASS TRUNK Right 7/20/2018    Procedure: EXCISION MASS TRUNK, RUQ ABD WALL LIPOMA;  Surgeon: Eduar Marx MD;  [place] [facility] [place] [facility]  Service: General   • PACEMAKER IMPLANTATION  05/2010    Inital dual-chamber pacemaker placed 5/11/10 after ASD closure surgery.  Had LV lead placement and generator change on 8/19/13 by Dr. Aguilar (Medtronic #[id number]).  Then had right atrial lead revision on 2/19/14 (for right atrial lead dislodgement and diaphragmatic stimulation) - developed left subclavian DVT afterwards.  Then had laser lead extraction of the abandoned right atrial lead on 4/17/14.   • REPLACEMENT TOTAL KNEE BILATERAL     • THYROID LOBECTOMY Right 2012   • THYROIDECTOMY Left 2011    Dr. Coronado, radioactive iodine X3   • TONSILLECTOMY     • TRICUSPID VALVE SURGERY       #32 MC3 annuloplasty ring by Dr. Harper on 5/6/10 (at time of ASD closure)       Current Outpatient Medications on File Prior to Visit   Medication Sig Dispense Refill   • carvedilol (COREG) 3.125 MG tablet Take 3.125 mg by mouth 2 (Two) Times a Day As Needed. DEPENDING ON BP.     • Ferrous Sulfate Dried 200 (65 FE) MG tablet Take 200 mg by mouth.     • folic acid (FOLVITE) 1 MG tablet Take 1 mg by mouth daily.     • HYDROcodone-acetaminophen (NORCO)  MG per tablet Take 1 tablet by mouth Every 6 (Six) Hours As Needed.     • levothyroxine (SYNTHROID, LEVOTHROID) 300 MCG tablet Take  by mouth Daily. Takes 227 mcg total daily  0   • losartan (COZAAR) 25 MG tablet Take 25 mg by mouth Daily As Needed.     • omeprazole (priLOSEC) 40 MG capsule Take 40 mg by mouth Daily.     • vitamin D (ERGOCALCIFEROL) 97265 units capsule capsule Take 50,000 Units by mouth 1 (One) Time Per Week. SUNDAY       No current facility-administered medications on file prior to visit.      Allergies as of 03/06/2019 - Reviewed 03/06/2019   Allergen Reaction Noted   • Ferumoxytol  02/10/2017   • Azithromycin Rash 06/02/2012     Social History     Socioeconomic History   • Marital status:      Spouse name: Not on file   • Number of children: 2   • Years of education: Not on file   • Highest education level: Not on file   Social Needs   • Financial resource strain: Not on file   • Food insecurity - worry: Not on file   • Food insecurity - inability: Not on file   • Transportation needs - medical: Not on file   • Transportation needs - non-medical: Not on file   Occupational History   • Occupation:    Tobacco Use   • Smoking status: Never Smoker   • Smokeless tobacco: Never Used   Substance and Sexual Activity   • Alcohol use: No   • Drug use: No   • Sexual activity: Yes     Partners: Male   Other Topics Concern   • Not on file   Social History Narrative   • Not on file     Family History   Problem Relation Age of Onset  "  • COPD Mother    • Heart failure Mother         CHF   • Lung disease Mother    • Hypertension Mother    • Diabetes Mother    • Coronary artery disease Mother    • Diabetes Sister    • Breast cancer Maternal Aunt    • Malig Hyperthermia Neg Hx        Review of Systems   Constitution: Positive for malaise/fatigue.   Cardiovascular: Positive for dyspnea on exertion.   Respiratory: Positive for cough, sputum production and wheezing.    Genitourinary: Positive for frequency.   All other systems reviewed and are negative.     Objective:     Vitals:    03/06/19 1520   BP: 120/86   BP Location: Left arm   Pulse: 90   Weight: 95 kg (209 lb 6.4 oz)   Height: 165.1 cm (65\")     Body mass index is 34.85 kg/m².    Physical Exam   Constitutional: She is oriented to person, place, and time. She appears well-developed and well-nourished.   HENT:   Head: Normocephalic and atraumatic.   Eyes: Conjunctivae are normal.   Neck: Neck supple.   Cardiovascular: Normal rate and regular rhythm. Exam reveals no friction rub.   Murmur heard.   Systolic murmur is present with a grade of 2/6 at the upper left sternal border and lower left sternal border.  Pulmonary/Chest: She has no rales.   Diffuse rhonchi bilaterally   Abdominal: Soft. There is no tenderness.   Musculoskeletal: She exhibits no edema.   Neurological: She is alert and oriented to person, place, and time.   Skin: Skin is warm.   Psychiatric: She has a normal mood and affect. Her behavior is normal.     Lab Review:   Procedures    Cardiac Procedures:  1. Status post closure of a large secundum ASD with a PeriPatch xenograft with concomitant  tricuspid valve repair with a #32 MC3 annuloplasty ring by Dr. Jaime Harper on 05/06/2010   at [facility].    2. Left heart catheterization on 03/26/2010 showed angiographically normal coronary  arteries.    3. Transesophageal echocardiogram on 06/21/2013 revealed an ejection fraction of 50-55%.    4. Status post placement of a dual " chamber permanent pacemaker by Dr. Bush on   05/11/2010 following postoperative asystole and complete AV block after her ASD surgery.    5. Status post left ventricular lead placement and pacemaker replacement on 08/19/2013 by  Dr. Aguilar. At that time she had a left ventricular lead added, to make this a  biventricular pacemaker. The generator was also changed and is a Medtronic #[id number].    6. Status post right atrial lead revision on 02/19/2014. This was secondary to right atrial   lead dislodgement and diaphragmatic stimulation. She did develop a left subclavian vein   thrombosis following the procedure.    7. Status post laser lead extraction of the abandoned right atrial lead on 04/17/2014 by Dr. Christiano Aguilar at [facility].    8. CHANDANA on 04/18/2016: Ejection fraction was 50%, the left ventricle was mildly dilated, there   was grade 1 diastolic dysfunction, the ASD closure site was intact with no evidence for   shunting, there was no evidence of vegetation or thrombus formation on the pacemaker   leads, there was mild mitral regurgitation, and there was trace to mild tricuspid regurgitation   through the annuloplasty ring.  10.  Echocardiogram on 10/10/2018: There was akinesis of the basal to mid inferolateral   wall.  Ejection fraction was 52%.  There was grade 1 diastolic dysfunction.  There was   trace tricuspid regurgitation through the annuloplasty ring.  11.  Lexiscan Myoview stress test on 10/10/2018: Normal with no ischemia.      Assessment:       Diagnosis Plan   1. Shortness of breath  CT Angiogram Chest With & Without Contrast   2. Wheezing  CT Angiogram Chest With & Without Contrast   3. Cough  CT Angiogram Chest With & Without Contrast   4. Hypoxia  CT Angiogram Chest With & Without Contrast   5. Tachycardia  CT Angiogram Chest With & Without Contrast   6. Status post patch closure of ASD     7. Non-ischemic cardiomyopathy (CMS/HCC)     8. Chronic combined systolic and diastolic congestive heart  failure (CMS/HCC)     9. Status post tricuspid valve repair     10. Pacemaker       Plan:       The patient has been coughing, wheezing, and has significant shortness of breath.  She also   has significant bilateral rhonchi on exam.  I going to check a stat CT angiogram of her chest to   ensure that she does not have a pulmonary embolism or other pulmonary pathology such as   pneumonia.  Her pacemaker was working normally today.  She has not been taking the  Bumex recently, as she has not needed it.  She will continue on the losartan and carvedilol. I   will have her come back in 6 months.    Heart Failure  Assessment  • NYHA class II - There is slight limitation of physical activity. The patient is comfortable at rest, but physical activity results in fatigue, palpitations or shortness of breath.  • Beta blocker prescribed  • Diuretics prescribed  • Angiotensin receptor blocker (ARB) prescribed  • The most recent ejection fraction is 52%  • The left ventricle was last assessed on 10/10/2018    Plan  • The patient has received heart failure education on the following topics: dietary sodium restriction, medication instructions, minimizing or avoiding NSAID use, symptom management, physical activity, weight monitoring and minimizing alcohol intake  • The heart failure care plan was discussed with the patient today including: continuing the current program  •  The patient was not counseled about ICD or CRT-D implantation    Subjective/Objective    • Physical exam findings negative for rales and peripheral edema.

## 2019-03-20 ENCOUNTER — TELEPHONE (OUTPATIENT)
Dept: CARDIOLOGY | Facility: CLINIC | Age: 54
End: 2019-03-20

## 2019-03-20 ENCOUNTER — TELEPHONE (OUTPATIENT)
Dept: FAMILY MEDICINE CLINIC | Facility: CLINIC | Age: 54
End: 2019-03-20

## 2019-03-20 NOTE — TELEPHONE ENCOUNTER
03/20/19   Pt called for an appt with Dr Marques and for Pacemaker.   I have scheduled pt to see Dr Marques on Wednesday 5/15/19 @ 3:00pm.   I advised pt to arrive at 2:30 pm for a Pacemaker check.   Will you please place pt on your schedule for this day.   Thanks Elmo GILL

## 2019-03-20 NOTE — TELEPHONE ENCOUNTER
PATIENT WAS IN THE [facility] AND SEE A PULMONARY WHILE IN HOSPITAL.  PATIENT NEEDS A REFERRAL TO SEE PULMONARY  DR. MINA SUH  WITH [facility]. NEEDS TO BE SEEN IN A COUPLE WEEKS FOR HOSPITAL F/U WITH DR. MINA SUH

## 2019-03-25 NOTE — TELEPHONE ENCOUNTER
They have called the pt on the 21st and the patient has not called back, I called the pt and gave number to schedule, documentation is in the referral.

## 2019-04-29 ENCOUNTER — TRANSCRIBE ORDERS (OUTPATIENT)
Dept: ADMINISTRATIVE | Facility: HOSPITAL | Age: 54
End: 2019-04-29

## 2019-04-29 DIAGNOSIS — R91.1 LUNG NODULE: Primary | ICD-10-CM

## 2019-05-15 ENCOUNTER — OFFICE VISIT (OUTPATIENT)
Dept: CARDIOLOGY | Facility: CLINIC | Age: 54
End: 2019-05-15

## 2019-05-15 ENCOUNTER — CLINICAL SUPPORT NO REQUIREMENTS (OUTPATIENT)
Dept: CARDIOLOGY | Facility: CLINIC | Age: 54
End: 2019-05-15

## 2019-05-15 VITALS
DIASTOLIC BLOOD PRESSURE: 78 MMHG | SYSTOLIC BLOOD PRESSURE: 110 MMHG | WEIGHT: 214 LBS | BODY MASS INDEX: 36.54 KG/M2 | HEIGHT: 64 IN

## 2019-05-15 DIAGNOSIS — I44.2 COMPLETE HEART BLOCK (HCC): Primary | ICD-10-CM

## 2019-05-15 DIAGNOSIS — Z87.74 STATUS POST PATCH CLOSURE OF ASD: Primary | ICD-10-CM

## 2019-05-15 DIAGNOSIS — Z98.890 STATUS POST TRICUSPID VALVE REPAIR: ICD-10-CM

## 2019-05-15 DIAGNOSIS — I42.9 CARDIOMYOPATHY, UNSPECIFIED TYPE (HCC): ICD-10-CM

## 2019-05-15 DIAGNOSIS — I50.42 CHRONIC COMBINED SYSTOLIC AND DIASTOLIC CONGESTIVE HEART FAILURE (HCC): ICD-10-CM

## 2019-05-15 PROCEDURE — 93288 INTERROG EVL PM/LDLS PM IP: CPT | Performed by: INTERNAL MEDICINE

## 2019-05-15 PROCEDURE — 99213 OFFICE O/P EST LOW 20 MIN: CPT | Performed by: INTERNAL MEDICINE

## 2019-06-09 NOTE — PROGRESS NOTES
Date of Office Visit: 05/15/2019  Encounter Provider: Lion Marques MD  Place of Service: [facility] CARDIOLOGY  Patient Name: Whitney Schofield  :1965    Chief complaint: Follow-up for open ASD closure, nonischemic cardiomyopathy,   chronic combined CHF, tricuspid valve repair, and pacemaker placement.    History of Present Illness:    I again had the pleasure of seeing your patient in cardiology office on 5/15/2019. As  you well know, she is a very pleasant 53 year old white female with a past history  significant for multiple cardiovascular issues who presents for follow-up. The patient   originally presented to [facility] with symptoms of palpitations and  shortness of breath in 2009. She subsequently was diagnosed with Hashimoto  thyroiditis, and underwent a partial thyroidectomy in 2010. As part of a routine  follow-up, she had a two-dimensional echocardiogram performed on 3/1/2010 which   revealed a severely dilated right ventricle and significant pulmonary hypertension. A CT   scan of her chest did not show any evidence for pulmonary embolus or other acute lung   pathology.  She underwent a transesophageal echocardiogram on 3/16/2010 which   revealed a large secundum ASD high in the atrial septum. 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 She was referred to   Dr. Harper for evaluation to surgically repair the ASD, and underwent a preoperative   cardiac catheterization on 3/26/2010. This revealed angiographically normal coronary   arteries, with a Qp:Qs of 2.3, consistent with a large shunt. The patient underwent   closure of the ASD, as well as tricuspid valve repair with an annuloplasty ring by Dr. Harper at [facility] on 2010. Her postoperative period was complicated by   complete AV block and accelerated junctional escape rhythm. She underwent   placement of a permanent  "pacemaker on 5/11/2010.      The patient continued to have significant shortness of breath and a 24-hour Holter   monitor and two-dimensional echocardiogram were obtained on 5/12/2011. Her   Holter monitor did show several brief episodes of tachycardia. The echocardiogram   did not show any significant findings other than a \"shuttering motion\" of the septum.   She underwent a right and left heart catheterization on 7/20/2011 to assess for any   evidence of constriction. There was no evidence of restriction or constriction on the   heart catheterization, although she did have pulmonary hypertension with a mean   pulmonary artery pressure of 30 mmHg. A transesophageal echocardiogram was also   performed on 6/20/2011 which revealed an ejection fraction of 55-60%, and an intact   ASD repair. An electrophysiology study was performed by Dr. Foster on 8/1/2011.   This was performed through her pacemaker in conjunction with an isoproterenol   infusion. This showed no inducible tachycardia or ventricular tachycardia.       The patient eventually wore a 21-day event recorder and was noted to go into   wide-complex tachycardia on 9/30/2012. She had multiple other episodes as well.   Dr. Aguilar from Electrophysiology saw the patient and felt this represented ventricular   tachycardia. She did ultimately undergo a repeat electrophysiology study on   11/20/2012 by Dr. Aguilar which confirmed complete infra-hisian block with no   conduction retrograde or antegrade. No ventricular tachycardia was induced. She   was started on beta blockers at that time.       At the patient's visit in 3/2013 she complained of severe volume retention and edema.    She was also more short of breath and had gained a significant amount of weight.   She was admitted to the hospital on 3/25/2013 and underwent a transesophageal   echocardiogram.  Her ejection fraction had decreased significantly to 35%, and there   was an extensive amount of thrombus formation " on her pacemaker wires. Her ASD   repair appeared to be in good condition. She did undergo a CT scan of the chest   which showed no evidence for pulmonary embolism, as well as a CT scan of the   abdomen which showed no evidence for inferior vena cava obstruction or clotting.   She was diuresed extensively and placed on Coumadin at that time. 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 She ultimately underwent left   ventricular lead placement and pacemaker replacement on 8/19/2013 by Dr. Aguilar.   She then had right atrial lead dislodgement and diaphragmatic stimulation. A right   atrial lead revision was performed on 2/19/2014; however, the patient developed a   significant left subclavian vein thrombosis post procedurally. She again was placed   on Coumadin at that time, which was then switched to Xarelto. She ultimately   underwent laser lead extraction of the abandoned right atrial lead on 4/17/2014. She   did undergo a right heart cath on 4/13/2015 which showed only mild pulmonary   hypertension (mean PA pressure 29 mm Hg).      The patient presents today for follow-up.  From a cardiac perspective, she seems to   be fairly stable.  Her shortness of breath is at baseline.  She still has occasional   palpitations, although nothing significant.  She only has trace edema and has not   taken Bumex in quite some time.  Her pacemaker is functioning normally today.  She   had 2 episodes of wide-complex tachycardia on the interrogation, which is consistent   with what she has had in the past.      Past Medical History:   Diagnosis Date   • Anemia    • ASD (atrial septal defect)    • Asthma    • Asystole (CMS/HCC)     Post ASD closure asystole and high-grade AV block.  s/p pacemaker 5/11/10.   • Celiac disease    • DVT (deep venous thrombosis) (CMS/HCC)     Left subclavian DVT following right atrial pacemaker lead revision in 2/14   • Edema    • Hashimoto's thyroiditis     s/p partial thyroidectomy  1/13/10   • Heart palpitations    • Hx of thyroid cancer    • Malignant neoplasm of thyroid gland (CMS/HCC)     Papillary carcinoma of the thyroid - s/p residual thyroidectomy in 2/11.   • Migraine    • Nonischemic cardiomyopathy (CMS/HCC)     EF as low as 35% 3/13.  EF 6/21/13 by CHANDANA was 50-55%, and 53% by echo on 6/17/14.  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  EF 50% by CHANDANA on 4/18/16.   • NSVT (nonsustained ventricular tachycardia) (CMS/HCC)     Noted by event recorder 9/12.  EP study by Dr. Aguilar on 11/20/12 showed no inducible VT.   • LUKAS on CPAP    • Papillary thyroid carcinoma (CMS/HCC) 08/27/2013   • Rheumatoid arthritis (CMS/HCC)     Severe and debilitating    • Secundum ASD     s/p open closure with a PeriPatch xenograft by Dr. Harper on 5/6/10 by Dr. Harper at [facility].   • Sleep apnea    • SOB (shortness of breath)     Multifactorial    • Thrombus due to any device, implant or graft     Extensive thrombus formation on the pacemaker leads by CHANDANA 3/25/13.  Initiated on Coumadin transiently at that time - improved significantly by CHANDANA 6/21/13.       Past Surgical History:   Procedure Laterality Date   • ASD AND VSD REPAIR     • CHOLECYSTECTOMY  2007   • ENDOSCOPY AND COLONOSCOPY  12/30/2015    Tom Orta MD   • EXCISION MASS TRUNK Right 7/20/2018    Procedure: EXCISION MASS TRUNK, RUQ ABD WALL LIPOMA;  Surgeon: Eduar Marx MD;  [place] [facility] [place] [facility] [place]  Service: General   • PACEMAKER IMPLANTATION  05/2010    Inital dual-chamber pacemaker placed 5/11/10 after ASD closure surgery.  Had LV lead placement and generator change on 8/19/13 by Dr. Aguilar (Medtronic #[id number]).  Then had right atrial lead revision on 2/19/14 (for right atrial lead dislodgement and diaphragmatic stimulation) - developed left subclavian DVT afterwards.  Then had laser lead extraction of the abandoned right atrial lead on 4/17/14.   • REPLACEMENT TOTAL KNEE BILATERAL     • THYROID LOBECTOMY Right 2012   •  THYROIDECTOMY Left 2011    Dr. Coronado, radioactive iodine X3   • TONSILLECTOMY     • TRICUSPID VALVE SURGERY      #32 MC3 annuloplasty ring by Dr. Harper on 5/6/10 (at time of ASD closure)       Current Outpatient Medications on File Prior to Visit   Medication Sig Dispense Refill   • albuterol sulfate  (90 Base) MCG/ACT inhaler Inhale 2 puffs Every 4 (Four) Hours As Needed for Wheezing. 2 puffs every 4 hours when necessary dyspnea 1 inhaler 1   • carvedilol (COREG) 3.125 MG tablet Take 3.125 mg by mouth 2 (Two) Times a Day As Needed. DEPENDING ON BP.     • Ferrous Sulfate Dried 200 (65 FE) MG tablet Take 200 mg by mouth.     • fluticasone-salmeterol (ADVAIR HFA) 115-21 MCG/ACT inhaler Inhale 2 puffs 2 (Two) Times a Day. 1 inhaler 0   • folic acid (FOLVITE) 1 MG tablet Take 1 mg by mouth daily.     • HYDROcodone-acetaminophen (NORCO)  MG per tablet Take 1 tablet by mouth Every 6 (Six) Hours As Needed.     • ipratropium-albuterol (DUO-NEB) 0.5-2.5 mg/3 ml nebulizer Take 3 mL by nebulization Every 4 (Four) Hours As Needed for Wheezing or Shortness of Air. 360 mL 0   • levothyroxine (SYNTHROID, LEVOTHROID) 300 MCG tablet Take  by mouth Daily. Takes 227 mcg total daily  0   • losartan (COZAAR) 25 MG tablet Take 25 mg by mouth Daily As Needed.     • omeprazole (priLOSEC) 40 MG capsule Take 40 mg by mouth Daily.     • vitamin D (ERGOCALCIFEROL) 80136 units capsule capsule Take 50,000 Units by mouth 1 (One) Time Per Week. SUNDAY       No current facility-administered medications on file prior to visit.      Allergies as of 05/15/2019 - Reviewed 03/19/2019   Allergen Reaction Noted   • Ferumoxytol  02/10/2017   • Azithromycin Rash 06/02/2012     Social History     Socioeconomic History   • Marital status:      Spouse name: Not on file   • Number of children: 2   • Years of education: Not on file   • Highest education level: Not on file   Occupational History   • Occupation:    Tobacco  "Use   • Smoking status: Never Smoker   • Smokeless tobacco: Never Used   Substance and Sexual Activity   • Alcohol use: No   • Drug use: No   • Sexual activity: Yes     Partners: Male     Family History   Problem Relation Age of Onset   • COPD Mother    • Heart failure Mother         CHF   • Lung disease Mother    • Hypertension Mother    • Diabetes Mother    • Coronary artery disease Mother    • Diabetes Sister    • Breast cancer Maternal Aunt    • Malig Hyperthermia Neg Hx        Review of Systems   Constitution: Positive for malaise/fatigue.   Cardiovascular: Positive for dyspnea on exertion.   Musculoskeletal: Positive for joint pain and joint swelling.   Neurological: Positive for excessive daytime sleepiness and light-headedness.   All other systems reviewed and are negative.     Objective:     Vitals:    05/15/19 1512   BP: 110/78   Weight: 97.1 kg (214 lb)   Height: 162.6 cm (64.02\")     Body mass index is 36.71 kg/m².    Physical Exam   Constitutional: She is oriented to person, place, and time. She appears well-developed and well-nourished.   HENT:   Head: Normocephalic and atraumatic.   Eyes: Conjunctivae are normal.   Neck: Neck supple.   Cardiovascular: Normal rate and regular rhythm. Exam reveals no friction rub.   Murmur heard.   Systolic murmur is present with a grade of 2/6 at the upper left sternal border and lower left sternal border.  Pulmonary/Chest: Effort normal and breath sounds normal. She has no rales.   Abdominal: Soft. There is no tenderness.   Musculoskeletal:   Trace edema of the lower extremities bilaterally    Neurological: She is alert and oriented to person, place, and time.   Skin: Skin is warm.   Psychiatric: She has a normal mood and affect. Her behavior is normal.     Lab Review:   Procedures    Cardiac Procedures:  1. Status post closure of a large secundum ASD with a PeriPatch xenograft with concomitant  tricuspid valve repair with a #32 MC3 annuloplasty ring by Dr. Sandoval " Leroy on 05/06/2010   at [facility].    2. Left heart catheterization on 03/26/2010 showed angiographically normal coronary  arteries.    3. Transesophageal echocardiogram on 06/21/2013 revealed an ejection fraction of 50-55%.    4. Status post placement of a dual chamber permanent pacemaker by Dr. Bush on   05/11/2010 following postoperative asystole and complete AV block after her ASD surgery.    5. Status post left ventricular lead placement and pacemaker replacement on 08/19/2013 by  Dr. Aguilar. At that time she had a left ventricular lead added, to make this a  biventricular pacemaker. The generator was also changed and is a Medtronic #[id number].    6. Status post right atrial lead revision on 02/19/2014. This was secondary to right atrial   lead dislodgement and diaphragmatic stimulation. She did develop a left subclavian vein   thrombosis following the procedure.    7. Status post laser lead extraction of the abandoned right atrial lead on 04/17/2014 by Dr. Christiano Aguilar at [facility].    8. CHANDANA on 04/18/2016: Ejection fraction was 50%, the left ventricle was mildly dilated, there   was grade 1 diastolic dysfunction, the ASD closure site was intact with no evidence for   shunting, there was no evidence of vegetation or thrombus formation on the pacemaker   leads, there was mild mitral regurgitation, and there was trace to mild tricuspid regurgitation   through the annuloplasty ring.  10.  Echocardiogram on 10/10/2018: There was akinesis of the basal to mid inferolateral   wall.  Ejection fraction was 52%.  There was grade 1 diastolic dysfunction.  There was   trace tricuspid regurgitation through the annuloplasty ring.  11.  Lexiscan Myoview stress test on 10/10/2018: Normal with no ischemia.    Assessment:       Diagnosis Plan   1. Status post patch closure of ASD     2. Cardiomyopathy, unspecified type (CMS/HCC)     3. Chronic combined systolic and diastolic congestive heart failure (CMS/HCC)     4.  Status post tricuspid valve repair       Plan:       The patient seems to be stable at this point from a cardiac perspective.  Her pacemaker was interrogated, and still has 1 year and 6 months left on the battery.  She is completely pacemaker dependent, and will obviously need to have this changed when appropriate.  She had 2 episodes of wide-complex tachycardia in March 2019 on the interrogation.  These are consistent with her previous episodes which have been worked up significantly in the past.  She was asymptomatic.  Her blood pressure has been well controlled, and her last ejection fraction was 52% in October 2018.  She is going to continue on the carvedilol and losartan.  For now, I did not change any medications.  I will plan on seeing her back in the office in 6 months.    Heart Failure  Assessment  • NYHA class II - There is slight limitation of physical activity. The patient is comfortable at rest, but physical activity results in fatigue, palpitations or shortness of breath.  • Beta blocker prescribed  • Diuretics prescribed  • Angiotensin receptor blocker (ARB) prescribed  • Left ventricular function is normal by qualitative assessment    Plan  • The patient has received heart failure education on the following topics: dietary sodium restriction, medication instructions, minimizing or avoiding NSAID use, symptom management, physical activity, weight monitoring and minimizing alcohol intake  • The heart failure care plan was discussed with the patient today including: continuing the current program  •  The patient was not counseled about ICD or CRT-D implantation    Subjective/Objective    • Physical exam findings negative for rales.

## 2019-06-25 ENCOUNTER — APPOINTMENT (OUTPATIENT)
Dept: CT IMAGING | Facility: HOSPITAL | Age: 54
End: 2019-06-25

## 2019-07-02 ENCOUNTER — HOSPITAL ENCOUNTER (OUTPATIENT)
Dept: CT IMAGING | Facility: HOSPITAL | Age: 54
Discharge: HOME | End: 2019-07-02
Admitting: INTERNAL MEDICINE

## 2019-07-02 DIAGNOSIS — R91.1 LUNG NODULE: ICD-10-CM

## 2019-07-02 PROCEDURE — 71250 CT THORAX DX C-: CPT

## 2019-08-06 ENCOUNTER — TELEPHONE (OUTPATIENT)
Dept: CARDIOLOGY | Facility: CLINIC | Age: 54
End: 2019-08-06

## 2019-08-06 ENCOUNTER — CLINICAL SUPPORT NO REQUIREMENTS (OUTPATIENT)
Dept: CARDIOLOGY | Facility: CLINIC | Age: 54
End: 2019-08-06

## 2019-08-06 DIAGNOSIS — I44.2 COMPLETE HEART BLOCK (HCC): Primary | ICD-10-CM

## 2019-08-06 PROCEDURE — 93280 PM DEVICE PROGR EVAL DUAL: CPT | Performed by: INTERNAL MEDICINE

## 2019-08-21 ENCOUNTER — TELEPHONE (OUTPATIENT)
Dept: FAMILY MEDICINE CLINIC | Facility: CLINIC | Age: 54
End: 2019-08-21

## 2019-08-21 DIAGNOSIS — M06.9 RHEUMATOID ARTHRITIS, INVOLVING UNSPECIFIED SITE, UNSPECIFIED RHEUMATOID FACTOR PRESENCE: Primary | ICD-10-CM

## 2019-11-13 ENCOUNTER — TELEPHONE (OUTPATIENT)
Dept: FAMILY MEDICINE CLINIC | Facility: CLINIC | Age: 54
End: 2019-11-13

## 2019-11-13 DIAGNOSIS — F41.9 ANXIETY: Primary | ICD-10-CM

## 2019-11-13 NOTE — TELEPHONE ENCOUNTER
Patient give number for [facility] she will see if they will accept , if not we will make a new referral

## 2019-11-13 NOTE — TELEPHONE ENCOUNTER
They do not accept  will refer for eval by psych or psychology if symptoms worsen advised to go to ER or [facility] for evaluation.

## 2019-11-18 ENCOUNTER — HOSPITAL ENCOUNTER (EMERGENCY)
Facility: HOSPITAL | Age: 54
Discharge: HOME | End: 2019-11-19
Attending: EMERGENCY MEDICINE | Admitting: EMERGENCY MEDICINE

## 2019-11-18 DIAGNOSIS — R51.9 ACUTE NONINTRACTABLE HEADACHE, UNSPECIFIED HEADACHE TYPE: Primary | ICD-10-CM

## 2019-11-18 DIAGNOSIS — I10 HYPERTENSION, UNSPECIFIED TYPE: ICD-10-CM

## 2019-11-18 LAB
BASOPHILS # BLD AUTO: 0.04 10*3/MM3 (ref 0–0.2)
BASOPHILS NFR BLD AUTO: 0.4 % (ref 0–1.5)
DEPRECATED RDW RBC AUTO: 45.1 FL (ref 37–54)
EOSINOPHIL # BLD AUTO: 0.17 10*3/MM3 (ref 0–0.4)
EOSINOPHIL NFR BLD AUTO: 1.7 % (ref 0.3–6.2)
ERYTHROCYTE [DISTWIDTH] IN BLOOD BY AUTOMATED COUNT: 14.9 % (ref 12.3–15.4)
HCT VFR BLD AUTO: 41.3 % (ref 34–46.6)
HGB BLD-MCNC: 12.8 G/DL (ref 12–15.9)
IMM GRANULOCYTES # BLD AUTO: 0.03 10*3/MM3 (ref 0–0.05)
IMM GRANULOCYTES NFR BLD AUTO: 0.3 % (ref 0–0.5)
LYMPHOCYTES # BLD AUTO: 1.32 10*3/MM3 (ref 0.7–3.1)
LYMPHOCYTES NFR BLD AUTO: 13.4 % (ref 19.6–45.3)
MCH RBC QN AUTO: 25.5 PG (ref 26.6–33)
MCHC RBC AUTO-ENTMCNC: 31 G/DL (ref 31.5–35.7)
MCV RBC AUTO: 82.4 FL (ref 79–97)
MONOCYTES # BLD AUTO: 0.77 10*3/MM3 (ref 0.1–0.9)
MONOCYTES NFR BLD AUTO: 7.8 % (ref 5–12)
NEUTROPHILS # BLD AUTO: 7.51 10*3/MM3 (ref 1.7–7)
NEUTROPHILS NFR BLD AUTO: 76.4 % (ref 42.7–76)
NRBC BLD AUTO-RTO: 0 /100 WBC (ref 0–0.2)
PLATELET # BLD AUTO: 311 10*3/MM3 (ref 140–450)
PMV BLD AUTO: 9.4 FL (ref 6–12)
RBC # BLD AUTO: 5.01 10*6/MM3 (ref 3.77–5.28)
WBC NRBC COR # BLD: 9.84 10*3/MM3 (ref 3.4–10.8)

## 2019-11-18 PROCEDURE — 93010 ELECTROCARDIOGRAM REPORT: CPT | Performed by: INTERNAL MEDICINE

## 2019-11-18 PROCEDURE — 93005 ELECTROCARDIOGRAM TRACING: CPT | Performed by: PHYSICIAN ASSISTANT

## 2019-11-18 PROCEDURE — 80053 COMPREHEN METABOLIC PANEL: CPT | Performed by: PHYSICIAN ASSISTANT

## 2019-11-18 PROCEDURE — 85025 COMPLETE CBC W/AUTO DIFF WBC: CPT | Performed by: PHYSICIAN ASSISTANT

## 2019-11-18 PROCEDURE — 99284 EMERGENCY DEPT VISIT MOD MDM: CPT

## 2019-11-19 ENCOUNTER — APPOINTMENT (OUTPATIENT)
Dept: CT IMAGING | Facility: HOSPITAL | Age: 54
End: 2019-11-19

## 2019-11-19 VITALS
OXYGEN SATURATION: 94 % | TEMPERATURE: 98 F | HEART RATE: 80 BPM | WEIGHT: 213.7 LBS | SYSTOLIC BLOOD PRESSURE: 123 MMHG | DIASTOLIC BLOOD PRESSURE: 83 MMHG | RESPIRATION RATE: 16 BRPM | BODY MASS INDEX: 34.34 KG/M2 | HEIGHT: 66 IN

## 2019-11-19 LAB
ALBUMIN SERPL-MCNC: 4.1 G/DL (ref 3.5–5.2)
ALBUMIN/GLOB SERPL: 1.1 G/DL
ALP SERPL-CCNC: 143 U/L (ref 39–117)
ALT SERPL W P-5'-P-CCNC: 22 U/L (ref 1–33)
ANION GAP SERPL CALCULATED.3IONS-SCNC: 12.9 MMOL/L (ref 5–15)
AST SERPL-CCNC: 19 U/L (ref 1–32)
BILIRUB SERPL-MCNC: 0.6 MG/DL (ref 0.2–1.2)
BUN BLD-MCNC: 14 MG/DL (ref 6–20)
BUN/CREAT SERPL: 15.6 (ref 7–25)
CALCIUM SPEC-SCNC: 8.8 MG/DL (ref 8.6–10.5)
CHLORIDE SERPL-SCNC: 103 MMOL/L (ref 98–107)
CO2 SERPL-SCNC: 25.1 MMOL/L (ref 22–29)
CREAT BLD-MCNC: 0.9 MG/DL (ref 0.57–1)
GFR SERPL CREATININE-BSD FRML MDRD: 65 ML/MIN/1.73
GLOBULIN UR ELPH-MCNC: 3.7 GM/DL
GLUCOSE BLD-MCNC: 124 MG/DL (ref 65–99)
HOLD SPECIMEN: NORMAL
HOLD SPECIMEN: NORMAL
POTASSIUM BLD-SCNC: 4 MMOL/L (ref 3.5–5.2)
PROT SERPL-MCNC: 7.8 G/DL (ref 6–8.5)
SODIUM BLD-SCNC: 141 MMOL/L (ref 136–145)
WHOLE BLOOD HOLD SPECIMEN: NORMAL
WHOLE BLOOD HOLD SPECIMEN: NORMAL

## 2019-11-19 PROCEDURE — 70450 CT HEAD/BRAIN W/O DYE: CPT

## 2019-11-19 RX ORDER — CLONIDINE HYDROCHLORIDE 0.2 MG/1
0.2 TABLET ORAL 2 TIMES DAILY
Qty: 14 TABLET | Refills: 0 | Status: SHIPPED | OUTPATIENT
Start: 2019-11-19

## 2019-11-19 NOTE — ED PROVIDER NOTES
EMERGENCY DEPARTMENT ENCOUNTER    CHIEF COMPLAINT  Chief Complaint: Headache  History given by: Patient  History limited by: None  Room Number: 22/22  PMD: Dmitri Schulte MD      HPI:  Pt is a 53 y.o. female who presents complaining of a headache that began earlier this evening around 2115 while she was driving home. She reports she arrived home several minutes later, and she checked her BP. She was running hypertensive at 172/110, and her HR was around 110. Patient reports she then began feeling facial numbness. Patient reports her facial numbness has improved since arriving in the ED, and she only feels numbess in her lower face at this time. She denies numbness in the BUE or BLE, chest pain, or SOA. Patient reports associated heart palpitations. Patient denies any other complaints at this time.    Duration/Onset/Timing: tonight/gradual/constant  Location: head  Radiation: none  Quality: headache  Intensity/Severity: moderate  Associated Symptoms: facial numbness, palpitations  Aggravating or Alleviating Factors: none  Previous Episodes: no      PAST MEDICAL HISTORY  Active Ambulatory Problems     Diagnosis Date Noted   • Hyperplastic polyp of intestine 09/21/2016   • Hemorrhoids 10/10/2016   • Gastroparesis 06/01/2018   • Esophagitis 08/09/2018   • Hashimoto's thyroiditis 03/11/2019   • LUKAS (obstructive sleep apnea) not on CPAP 03/11/2019   • Nonischemic cardiomyopathy (CMS/[facility]) 03/11/2019   • History of papillary adenocarcinoma of thyroid 03/11/2019   • PNA (pneumonia) 03/11/2019   • Bronchospasm 03/11/2019   • Asthma 03/11/2019   • UTI (urinary tract infection) due to urinary indwelling catheter (CMS/[facility]) 03/11/2019   • Presence of cardiac pacemaker 03/11/2019   • UTI due to extended-spectrum beta lactamase (ESBL) producing Escherichia coli 03/11/2019   • Immunocompromised (CMS/[facility]) 03/11/2019   • UTI (urinary tract infection) 03/11/2019   • Pneumonia 03/12/2019     Resolved Ambulatory Problems     Diagnosis  Date Noted   • Lipoma of abdominal wall 06/13/2018     Past Medical History:   Diagnosis Date   • Anemia    • ASD (atrial septal defect)    • Asthma    • Asystole (CMS/HCC)    • Celiac disease    • DVT (deep venous thrombosis) (CMS/HCC)    • Edema    • Hashimoto's thyroiditis    • Heart palpitations    • Hx of thyroid cancer    • Malignant neoplasm of thyroid gland (CMS/HCC)    • Migraine    • Nonischemic cardiomyopathy (CMS/HCC)    • NSVT (nonsustained ventricular tachycardia) (CMS/HCC)    • LUKAS on CPAP    • Papillary thyroid carcinoma (CMS/HCC) 08/27/2013   • Rheumatoid arthritis (CMS/HCC)    • Secundum ASD    • Sleep apnea    • SOB (shortness of breath)    • Thrombus due to any device, implant or graft        PAST SURGICAL HISTORY  Past Surgical History:   Procedure Laterality Date   • ASD AND VSD REPAIR     • CHOLECYSTECTOMY  2007   • ENDOSCOPY AND COLONOSCOPY  12/30/2015    Tom Orta MD   • EXCISION MASS TRUNK Right 7/20/2018    Procedure: EXCISION MASS TRUNK, RUQ ABD WALL LIPOMA;  Surgeon: Eduar Marx MD;  [place] [facility] [place] [facility] [place]: [place]   • PACEMAKER IMPLANTATION  05/2010    Inital dual-chamber pacemaker placed 5/11/10 after ASD closure surgery.  Had LV lead placement and generator change on 8/19/13 by Dr. Aguilar ([employer] #[id number]).  Then had right atrial lead revision on 2/19/14 (for right atrial lead dislodgement and diaphragmatic stimulation) - developed left subclavian DVT afterwards.  Then had laser lead extraction of the abandoned right atrial lead on 4/17/14.   • REPLACEMENT TOTAL KNEE BILATERAL     • SHOULDER SURGERY Right    • THYROID LOBECTOMY Right 2012   • THYROIDECTOMY Left 2011    Dr. Coronado, radioactive iodine X3   • TONSILLECTOMY     • TRICUSPID VALVE SURGERY      #32 MC3 annuloplasty ring by Dr. Harper on 5/6/10 (at time of ASD closure)       FAMILY HISTORY  Family History   Problem Relation Age of Onset   • COPD Mother    • Heart failure Mother         CHF   • Lung  disease Mother    • Hypertension Mother    • Diabetes Mother    • Coronary artery disease Mother    • Diabetes Sister    • Breast cancer Maternal Aunt    • Malig Hyperthermia Neg Hx        SOCIAL HISTORY  Social History     Socioeconomic History   • Marital status:      Spouse name: Not on file   • Number of children: 2   • Years of education: Not on file   • Highest education level: Not on file   Occupational History   • Occupation:    Tobacco Use   • Smoking status: Never Smoker   • Smokeless tobacco: Never Used   Substance and Sexual Activity   • Alcohol use: No   • Drug use: No   • Sexual activity: Yes     Partners: Male       ALLERGIES  Ferumoxytol and Azithromycin    REVIEW OF SYSTEMS  Review of Systems   Constitutional: Negative for fever.   HENT: Negative for sore throat.    Eyes: Negative.    Respiratory: Negative for cough and shortness of breath.    Cardiovascular: Positive for palpitations. Negative for chest pain.        +hypertension   Gastrointestinal: Negative for abdominal pain, diarrhea and vomiting.   Genitourinary: Negative for dysuria.   Musculoskeletal: Negative for neck pain.   Skin: Negative for rash.   Allergic/Immunologic: Negative.    Neurological: Positive for numbness (improved) and headaches. Negative for weakness.   Hematological: Negative.    Psychiatric/Behavioral: Negative.    All other systems reviewed and are negative.      PHYSICAL EXAM  ED Triage Vitals [11/18/19 2313]   Temp Heart Rate Resp BP SpO2   98 °F (36.7 °C) 118 20 -- 99 %      Temp src Heart Rate Source Patient Position BP Location FiO2 (%)   Tympanic -- -- -- --       Physical Exam   Constitutional: She is oriented to person, place, and time. No distress.   HENT:   Head: Normocephalic and atraumatic.   Eyes: EOM are normal. Pupils are equal, round, and reactive to light.   Neck: Normal range of motion. Neck supple.   Cardiovascular: Normal rate, regular rhythm and normal heart sounds. Exam  reveals no gallop and no friction rub.   No murmur heard.  Pulmonary/Chest: Effort normal and breath sounds normal. No respiratory distress. She has no wheezes. She has no rhonchi. She has no rales.   Abdominal: Soft. There is no tenderness. There is no rebound and no guarding.   Musculoskeletal: Normal range of motion. She exhibits no edema.   Neurological: She is alert and oriented to person, place, and time. She has normal sensation, normal strength and intact cranial nerves. She displays facial symmetry. No cranial nerve deficit.   Skin: Skin is warm and dry. No rash noted.   Psychiatric: Mood and affect normal.   Nursing note and vitals reviewed.      LAB RESULTS  Lab Results (last 24 hours)     Procedure Component Value Units Date/Time    CBC & Differential [[id number]] Collected:  11/18/19 2334    Specimen:  Blood Updated:  11/18/19 2355    Narrative:       The following orders were created for panel order CBC & Differential.  Procedure                               Abnormality         Status                     ---------                               -----------         ------                     CBC Auto Differential[[id number]]        Abnormal            Final result                 Please view results for these tests on the individual orders.    Comprehensive Metabolic Panel [[id number]]  (Abnormal) Collected:  11/18/19 2334    Specimen:  Blood Updated:  11/19/19 0037     Glucose 124 mg/dL      BUN 14 mg/dL      Creatinine 0.90 mg/dL      Sodium 141 mmol/L      Potassium 4.0 mmol/L      Chloride 103 mmol/L      CO2 25.1 mmol/L      Calcium 8.8 mg/dL      Total Protein 7.8 g/dL      Albumin 4.10 g/dL      ALT (SGPT) 22 U/L      AST (SGOT) 19 U/L      Alkaline Phosphatase 143 U/L      Total Bilirubin 0.6 mg/dL      eGFR Non African Amer 65 mL/min/1.73      Globulin 3.7 gm/dL      A/G Ratio 1.1 g/dL      BUN/Creatinine Ratio 15.6     Anion Gap 12.9 mmol/L     Narrative:       GFR Normal >60  Chronic Kidney  Disease <60  Kidney Failure <15    CBC Auto Differential [[id number]]  (Abnormal) Collected:  11/18/19 2334    Specimen:  Blood Updated:  11/18/19 2355     WBC 9.84 10*3/mm3      RBC 5.01 10*6/mm3      Hemoglobin 12.8 g/dL      Hematocrit 41.3 %      MCV 82.4 fL      MCH 25.5 pg      MCHC 31.0 g/dL      RDW 14.9 %      RDW-SD 45.1 fl      MPV 9.4 fL      Platelets 311 10*3/mm3      Neutrophil % 76.4 %      Lymphocyte % 13.4 %      Monocyte % 7.8 %      Eosinophil % 1.7 %      Basophil % 0.4 %      Immature Grans % 0.3 %      Neutrophils, Absolute 7.51 10*3/mm3      Lymphocytes, Absolute 1.32 10*3/mm3      Monocytes, Absolute 0.77 10*3/mm3      Eosinophils, Absolute 0.17 10*3/mm3      Basophils, Absolute 0.04 10*3/mm3      Immature Grans, Absolute 0.03 10*3/mm3      nRBC 0.0 /100 WBC           I ordered the above labs and reviewed the results    RADIOLOGY  CT Head Without Contrast   Final Result   1. No acute intracranial abnormality.                       This report was finalized on 11/19/2019 5:33 AM by Curt Olivier M.D.               I ordered the above noted radiological studies. Interpreted by radiologist. Reviewed by me in PACS.       PROCEDURES  Procedures      PROGRESS AND CONSULTS  ED Course as of Nov 19 0545 Tue Nov 19, 2019   0112 EKG          EKG time: 2344  Rhythm/Rate: 90/Paced  P waves and DE: NL interval  QRS, axis: No acute cgs   ST and T waves: No acute cgs     Interpreted Contemporaneously by me, independently viewed  Unchanged from 7/18/18    [RC]      ED Course User Index  [RC] Carlos Dey III, PA      2335 Labs and CT head ordered for evaluation.    0025 reviewed patient's case with Dr. Phillips, ER physician. After bedside evaluation, Dr. Phillips agrees with the plan of care.     0115 Rechecked the patient who is resting comfortably and in NAD. The patient is stable.  BP- 159/51 HR- 101 Temp- 98 °F (36.7 °C) (Tympanic) O2 sat- 98%. Informed the patient of all labs and imaging.  Discussed the plan for discharge with instructions to f/u with PCP for further evaluation and management. Pt understands and agrees with the plan, all questions answered.    MEDICAL DECISION MAKING  Results were reviewed/discussed with the patient and they were also made aware of online access. Pt also made aware that some labs, such as cultures, will not be resulted during ER visit and follow up with PMD is necessary.     MDM  Number of Diagnoses or Management Options  Acute nonintractable headache, unspecified headache type:   Hypertension, unspecified type:      Amount and/or Complexity of Data Reviewed  Clinical lab tests: ordered and reviewed  Tests in the radiology section of CPT®: ordered and reviewed  Tests in the medicine section of CPT®: ordered and reviewed (See procedure note for EKG.)  Decide to obtain previous medical records or to obtain history from someone other than the patient: yes  Review and summarize past medical records: yes  Discuss the patient with other providers: yes (Dr. Alan MD)  Independent visualization of images, tracings, or specimens: yes    Patient Progress  Patient progress: stable         DIAGNOSIS  Final diagnoses:   Acute nonintractable headache, unspecified headache type   Hypertension, unspecified type       DISPOSITION  DISCHARGE    Patient discharged in stable condition.    Reviewed implications of results, diagnosis, meds, responsibility to follow up, warning signs and symptoms of possible worsening, potential complications and reasons to return to ER, including any new or worsening symptoms.    Patient/Family voiced understanding of above instructions.    Discussed plan for discharge, as there is no emergent indication for admission. Patient referred to primary care provider for BP management due to today's BP. Pt/family is agreeable and understands need for follow up and repeat testing.  Pt is aware that discharge does not mean that nothing is wrong but it indicates  no emergency is present that requires admission and they must continue care with follow-up as given below or physician of their choice.     FOLLOW-UP  Dmitri Schulte MD  [address] [facility] [address] [phone]    Schedule an appointment as soon as possible for a visit in 1 week  For further evaluation and treatment (take a blood pressure diary with you)    [facility] NEUROLOGY  [address]  [facility] [address] [phone]  Schedule an appointment as soon as possible for a visit   For further evaluation and treatment         Medication List      New Prescriptions    cloNIDine 0.2 MG tablet  Commonly known as:  CATAPRES  Take 1 tablet by mouth 2 (Two) Times a Day. Take blood twice a day as   needed for blood pressure over 180/90.              Latest Documented Vital Signs:  As of 5:45 AM  BP- 123/83 HR- 80 Temp- 98 °F (36.7 °C) (Tympanic) O2 sat- 94%    --  Documentation assistance provided by shaun Pretty for Carlos Dey PA-C.  Information recorded by the scribe was done at my direction and has been verified and validated by me.            Hawa Pretty  11/19/19 2386       Carlos Dey III, PA  11/19/19 4415

## 2019-11-19 NOTE — ED NOTES
After dinner, pt reports headache that started around 2115. Pt reports left facial numbness that started 45mins later. Pt states that she took her bp and it was elevated. No facial droop. Speech clear. Reports decreased sensation to left cheek.     Guera Schneider, RN  11/18/19 2326

## 2019-11-19 NOTE — DISCHARGE INSTRUCTIONS
-Take your blood pressure 2 time a day and write it down.  Take the clonidine 2 times a day as needed for blood pressure over 180/90.  - Return to the ER with any further concerns, should your condition change/worsen, or should you develop a fever.

## 2019-11-19 NOTE — ED PROVIDER NOTES
Pt presents to ED c/o L frontal HA that began at 2115 tonight. Pt also c/o L sided facial numbness, HTN, and elevated HR, but denies any focal weakness or facial asymmetry.    Upon exam, pt is A&O x3, anxious, in NAD, and has no focal motor deficits.    Discussed with pt plan to obtain lab and CT head results prior to disposition. Pt understands and agrees with the plan, all questions answered.    MD ATTESTATION NOTE    The RAOUL and I have discussed this patient's history, physical exam, and treatment plan.  I have reviewed the documentation and personally had a face to face interaction with the patient. I affirm the documentation and agree with the treatment and plan.  The attached note describes my personal findings.    Documentation assistance provided by shaun Ocampo for Dr. Phillips.  Information recorded by the scribe was done at my direction and has been verified and validated by me.     Deneen Ocampo  11/19/19 0034       Zaki Phillips MD  11/19/19 2407

## 2019-11-19 NOTE — ED TRIAGE NOTES
"Pt to ED with c/o headache, facial numbness and SOA.  Pt reports being out at dinner and felt like she was getting a headache.  Pt reports now she has facial numbness and states \"it doesn't feel like pins and needles, it just feels weird.\"  No facial droop or slurred speech noted. Pt also reports feeling SOA.  "

## 2019-12-02 ENCOUNTER — TELEPHONE (OUTPATIENT)
Dept: CARDIOLOGY | Facility: CLINIC | Age: 54
End: 2019-12-02

## 2019-12-02 NOTE — TELEPHONE ENCOUNTER
12/2/19@ 11:29 am  PM check needs rescheduled:   Pt needed reschedule appt and PM check on 12/11/19 she cannot make that appt.   I spoke with pt and rescheduled her appt for 12/19/19 @ 9:40am to see Dr OAKES   She will need a PM check same day. She is aware to come in early for PM check.   Thanks Elmo IGLL

## 2019-12-09 ENCOUNTER — TELEPHONE (OUTPATIENT)
Dept: FAMILY MEDICINE CLINIC | Facility: CLINIC | Age: 54
End: 2019-12-09

## 2019-12-09 NOTE — TELEPHONE ENCOUNTER
Pt needs her referral for cardiologist   (Dr Guo) renewed. Her appt is next Tuesday 12/17/19. She left a message on the referral line last week. Does this have to be put in as a new Referral?

## 2019-12-19 ENCOUNTER — OFFICE VISIT (OUTPATIENT)
Dept: CARDIOLOGY | Facility: CLINIC | Age: 54
End: 2019-12-19

## 2019-12-19 ENCOUNTER — CLINICAL SUPPORT NO REQUIREMENTS (OUTPATIENT)
Dept: CARDIOLOGY | Facility: CLINIC | Age: 54
End: 2019-12-19

## 2019-12-19 VITALS
SYSTOLIC BLOOD PRESSURE: 132 MMHG | HEART RATE: 79 BPM | HEIGHT: 64 IN | BODY MASS INDEX: 36.19 KG/M2 | DIASTOLIC BLOOD PRESSURE: 82 MMHG | OXYGEN SATURATION: 99 % | WEIGHT: 212 LBS

## 2019-12-19 DIAGNOSIS — I42.8 NICM (NONISCHEMIC CARDIOMYOPATHY) (HCC): ICD-10-CM

## 2019-12-19 DIAGNOSIS — Z98.890 HISTORY OF TRICUSPID VALVE REPAIR: Primary | ICD-10-CM

## 2019-12-19 DIAGNOSIS — Z95.0 PRESENCE OF CARDIAC PACEMAKER: ICD-10-CM

## 2019-12-19 DIAGNOSIS — I50.42 CHRONIC COMBINED SYSTOLIC AND DIASTOLIC CONGESTIVE HEART FAILURE (HCC): ICD-10-CM

## 2019-12-19 DIAGNOSIS — I42.8 NONISCHEMIC CARDIOMYOPATHY (HCC): Primary | ICD-10-CM

## 2019-12-19 DIAGNOSIS — Z87.74 STATUS POST PATCH CLOSURE OF ASD: ICD-10-CM

## 2019-12-19 PROCEDURE — 93288 INTERROG EVL PM/LDLS PM IP: CPT | Performed by: INTERNAL MEDICINE

## 2019-12-19 PROCEDURE — 99213 OFFICE O/P EST LOW 20 MIN: CPT | Performed by: INTERNAL MEDICINE

## 2020-01-15 ENCOUNTER — APPOINTMENT (OUTPATIENT)
Dept: CARDIOLOGY | Facility: HOSPITAL | Age: 55
End: 2020-01-15
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
## 2020-02-12 ENCOUNTER — CLINICAL SUPPORT NO REQUIREMENTS (OUTPATIENT)
Dept: CARDIOLOGY | Facility: CLINIC | Age: 55
End: 2020-02-12

## 2020-02-12 ENCOUNTER — HOSPITAL ENCOUNTER (OUTPATIENT)
Dept: CARDIOLOGY | Facility: HOSPITAL | Age: 55
Discharge: HOME | End: 2020-02-12
Admitting: INTERNAL MEDICINE

## 2020-02-12 VITALS
SYSTOLIC BLOOD PRESSURE: 150 MMHG | HEART RATE: 87 BPM | HEIGHT: 64 IN | BODY MASS INDEX: 36.19 KG/M2 | WEIGHT: 212 LBS | DIASTOLIC BLOOD PRESSURE: 80 MMHG | OXYGEN SATURATION: 97 %

## 2020-02-12 DIAGNOSIS — Z98.890 HISTORY OF TRICUSPID VALVE REPAIR: ICD-10-CM

## 2020-02-12 DIAGNOSIS — I42.8 NICM (NONISCHEMIC CARDIOMYOPATHY) (HCC): Primary | ICD-10-CM

## 2020-02-12 LAB
AORTIC ARCH: 2.6 CM
AORTIC ROOT ANNULUS: 2.1 CM
ASCENDING AORTA: 2.8 CM
BH CV ECHO MEAS - AO MAX PG (FULL): 1.9 MMHG
BH CV ECHO MEAS - AO MAX PG: 5.9 MMHG
BH CV ECHO MEAS - AO MEAN PG (FULL): 1 MMHG
BH CV ECHO MEAS - AO MEAN PG: 3 MMHG
BH CV ECHO MEAS - AO V2 MAX: 121 CM/SEC
BH CV ECHO MEAS - AO V2 MEAN: 85 CM/SEC
BH CV ECHO MEAS - AO V2 VTI: 20.6 CM
BH CV ECHO MEAS - ASC AORTA: 2.8 CM
BH CV ECHO MEAS - AVA(I,A): 3.4 CM^2
BH CV ECHO MEAS - AVA(I,D): 3.4 CM^2
BH CV ECHO MEAS - AVA(V,A): 2.9 CM^2
BH CV ECHO MEAS - AVA(V,D): 2.9 CM^2
BH CV ECHO MEAS - BSA(HAYCOCK): 2.1 M^2
BH CV ECHO MEAS - BSA: 2 M^2
BH CV ECHO MEAS - BZI_BMI: 36.4 KILOGRAMS/M^2
BH CV ECHO MEAS - BZI_METRIC_HEIGHT: 162.6 CM
BH CV ECHO MEAS - BZI_METRIC_WEIGHT: 96.2 KG
BH CV ECHO MEAS - EDV(MOD-SP2): 114 ML
BH CV ECHO MEAS - EDV(MOD-SP4): 87 ML
BH CV ECHO MEAS - EDV(TEICH): 106.5 ML
BH CV ECHO MEAS - EF(CUBED): 64.3 %
BH CV ECHO MEAS - EF(MOD-BP): 54 %
BH CV ECHO MEAS - EF(MOD-SP2): 54.4 %
BH CV ECHO MEAS - EF(MOD-SP4): 52.9 %
BH CV ECHO MEAS - EF(TEICH): 55.8 %
BH CV ECHO MEAS - ESV(MOD-SP2): 52 ML
BH CV ECHO MEAS - ESV(MOD-SP4): 41 ML
BH CV ECHO MEAS - ESV(TEICH): 47.1 ML
BH CV ECHO MEAS - FS: 29.1 %
BH CV ECHO MEAS - IVS/LVPW: 1.1
BH CV ECHO MEAS - IVSD: 1.1 CM
BH CV ECHO MEAS - LAT PEAK E' VEL: 7 CM/SEC
BH CV ECHO MEAS - LV DIASTOLIC VOL/BSA (35-75): 43.4 ML/M^2
BH CV ECHO MEAS - LV MASS(C)D: 179.4 GRAMS
BH CV ECHO MEAS - LV MASS(C)DI: 89.5 GRAMS/M^2
BH CV ECHO MEAS - LV MAX PG: 4 MMHG
BH CV ECHO MEAS - LV MEAN PG: 2 MMHG
BH CV ECHO MEAS - LV SYSTOLIC VOL/BSA (12-30): 20.4 ML/M^2
BH CV ECHO MEAS - LV V1 MAX: 99.9 CM/SEC
BH CV ECHO MEAS - LV V1 MEAN: 73.3 CM/SEC
BH CV ECHO MEAS - LV V1 VTI: 20.5 CM
BH CV ECHO MEAS - LVIDD: 4.8 CM
BH CV ECHO MEAS - LVIDS: 3.4 CM
BH CV ECHO MEAS - LVLD AP2: 7.4 CM
BH CV ECHO MEAS - LVLD AP4: 7 CM
BH CV ECHO MEAS - LVLS AP2: 6.3 CM
BH CV ECHO MEAS - LVLS AP4: 6.1 CM
BH CV ECHO MEAS - LVOT AREA (M): 3.5 CM^2
BH CV ECHO MEAS - LVOT AREA: 3.5 CM^2
BH CV ECHO MEAS - LVOT DIAM: 2.1 CM
BH CV ECHO MEAS - LVPWD: 1 CM
BH CV ECHO MEAS - MED PEAK E' VEL: 9 CM/SEC
BH CV ECHO MEAS - MR MAX PG: 103.2 MMHG
BH CV ECHO MEAS - MR MAX VEL: 508 CM/SEC
BH CV ECHO MEAS - MV A DUR: 0.15 SEC
BH CV ECHO MEAS - MV A MAX VEL: 86.1 CM/SEC
BH CV ECHO MEAS - MV DEC SLOPE: 190 CM/SEC^2
BH CV ECHO MEAS - MV DEC TIME: 0.28 SEC
BH CV ECHO MEAS - MV E MAX VEL: 53.6 CM/SEC
BH CV ECHO MEAS - MV E/A: 0.62
BH CV ECHO MEAS - MV MAX PG: 2.5 MMHG
BH CV ECHO MEAS - MV MEAN PG: 1 MMHG
BH CV ECHO MEAS - MV P1/2T MAX VEL: 55.7 CM/SEC
BH CV ECHO MEAS - MV P1/2T: 85.9 MSEC
BH CV ECHO MEAS - MV V2 MAX: 78.7 CM/SEC
BH CV ECHO MEAS - MV V2 MEAN: 44.1 CM/SEC
BH CV ECHO MEAS - MV V2 VTI: 32.5 CM
BH CV ECHO MEAS - MVA P1/2T LCG: 3.9 CM^2
BH CV ECHO MEAS - MVA(P1/2T): 2.6 CM^2
BH CV ECHO MEAS - MVA(VTI): 2.2 CM^2
BH CV ECHO MEAS - PA MAX PG (FULL): 1.9 MMHG
BH CV ECHO MEAS - PA MAX PG: 3.3 MMHG
BH CV ECHO MEAS - PA V2 MAX: 90.2 CM/SEC
BH CV ECHO MEAS - PULM A REVS DUR: 0.12 SEC
BH CV ECHO MEAS - PULM A REVS VEL: 42 CM/SEC
BH CV ECHO MEAS - PULM DIAS VEL: 39.8 CM/SEC
BH CV ECHO MEAS - PULM S/D: 1.3
BH CV ECHO MEAS - PULM SYS VEL: 51.4 CM/SEC
BH CV ECHO MEAS - PVA(V,A): 2.2 CM^2
BH CV ECHO MEAS - PVA(V,D): 2.2 CM^2
BH CV ECHO MEAS - QP/QS: 0.59
BH CV ECHO MEAS - RV MAX PG: 1.3 MMHG
BH CV ECHO MEAS - RV MEAN PG: 1 MMHG
BH CV ECHO MEAS - RV V1 MAX: 57.4 CM/SEC
BH CV ECHO MEAS - RV V1 MEAN: 41.2 CM/SEC
BH CV ECHO MEAS - RV V1 VTI: 12.1 CM
BH CV ECHO MEAS - RVOT AREA: 3.5 CM^2
BH CV ECHO MEAS - RVOT DIAM: 2.1 CM
BH CV ECHO MEAS - SI(CUBED): 35 ML/M^2
BH CV ECHO MEAS - SI(LVOT): 35.4 ML/M^2
BH CV ECHO MEAS - SI(MOD-SP2): 30.9 ML/M^2
BH CV ECHO MEAS - SI(MOD-SP4): 22.9 ML/M^2
BH CV ECHO MEAS - SI(TEICH): 29.6 ML/M^2
BH CV ECHO MEAS - SV(CUBED): 70.3 ML
BH CV ECHO MEAS - SV(LVOT): 71 ML
BH CV ECHO MEAS - SV(MOD-SP2): 62 ML
BH CV ECHO MEAS - SV(MOD-SP4): 46 ML
BH CV ECHO MEAS - SV(RVOT): 41.9 ML
BH CV ECHO MEAS - SV(TEICH): 59.4 ML
BH CV ECHO MEAS - TAPSE (>1.6): 1.7 CM2
BH CV ECHO MEASUREMENTS AVERAGE E/E' RATIO: 6.7
BH CV XLRA - RV BASE: 2.3 CM
BH CV XLRA - RV LENGTH: 7.1 CM
BH CV XLRA - RV MID: 1.5 CM
BH CV XLRA - TDI S': 8 CM/SEC
LEFT ATRIUM VOLUME INDEX: 20 ML/M2
LV EF 2D ECHO EST: 54 %
SINUS: 3.3 CM
STJ: 2.6 CM

## 2020-02-12 PROCEDURE — 93306 TTE W/DOPPLER COMPLETE: CPT

## 2020-02-12 PROCEDURE — 93306 TTE W/DOPPLER COMPLETE: CPT | Performed by: INTERNAL MEDICINE

## 2020-02-12 PROCEDURE — 93281 PM DEVICE PROGR EVAL MULTI: CPT | Performed by: INTERNAL MEDICINE

## 2020-02-17 NOTE — PROGRESS NOTES
Called and left a voicemail on her cell phone.  Told her to call with any questions or if she wants to go over specifics.    VIOLETTE

## 2020-02-24 DIAGNOSIS — M54.2 NECK PAIN: ICD-10-CM

## 2020-02-24 DIAGNOSIS — M25.512 LEFT SHOULDER PAIN, UNSPECIFIED CHRONICITY: Primary | ICD-10-CM

## 2020-02-25 RX ORDER — METOPROLOL SUCCINATE 25 MG/1
12.5 TABLET, EXTENDED RELEASE ORAL DAILY
Qty: 45 TABLET | Refills: 3 | Status: SHIPPED | OUTPATIENT
Start: 2020-02-25 | End: 2021-10-22 | Stop reason: SDUPTHER

## 2020-05-22 ENCOUNTER — CLINICAL SUPPORT NO REQUIREMENTS (OUTPATIENT)
Dept: CARDIOLOGY | Facility: CLINIC | Age: 55
End: 2020-05-22

## 2020-05-22 DIAGNOSIS — I42.8 NONISCHEMIC CARDIOMYOPATHY (HCC): Primary | ICD-10-CM

## 2020-05-22 DIAGNOSIS — I44.2 COMPLETE HEART BLOCK (HCC): ICD-10-CM

## 2020-05-22 PROCEDURE — 93288 INTERROG EVL PM/LDLS PM IP: CPT | Performed by: INTERNAL MEDICINE

## 2020-07-09 ENCOUNTER — CLINICAL SUPPORT NO REQUIREMENTS (OUTPATIENT)
Dept: CARDIOLOGY | Facility: CLINIC | Age: 55
End: 2020-07-09

## 2020-07-09 ENCOUNTER — OFFICE VISIT (OUTPATIENT)
Dept: CARDIOLOGY | Facility: CLINIC | Age: 55
End: 2020-07-09

## 2020-07-09 VITALS
BODY MASS INDEX: 35.24 KG/M2 | DIASTOLIC BLOOD PRESSURE: 90 MMHG | WEIGHT: 206.4 LBS | HEIGHT: 64 IN | HEART RATE: 80 BPM | SYSTOLIC BLOOD PRESSURE: 150 MMHG | OXYGEN SATURATION: 98 %

## 2020-07-09 DIAGNOSIS — Z95.0 PACEMAKER: ICD-10-CM

## 2020-07-09 DIAGNOSIS — I42.8 NONISCHEMIC CARDIOMYOPATHY (HCC): Primary | ICD-10-CM

## 2020-07-09 DIAGNOSIS — I44.2 COMPLETE HEART BLOCK (HCC): ICD-10-CM

## 2020-07-09 DIAGNOSIS — Z87.74 STATUS POST PATCH CLOSURE OF ASD: ICD-10-CM

## 2020-07-09 DIAGNOSIS — I50.22 CHRONIC SYSTOLIC CONGESTIVE HEART FAILURE (HCC): Primary | ICD-10-CM

## 2020-07-09 PROCEDURE — 93281 PM DEVICE PROGR EVAL MULTI: CPT | Performed by: INTERNAL MEDICINE

## 2020-07-09 PROCEDURE — 99214 OFFICE O/P EST MOD 30 MIN: CPT | Performed by: NURSE PRACTITIONER

## 2020-07-09 NOTE — PROGRESS NOTES
"      [facility] CARD[facility]  [address]  [facility] [address]  Phone: [phone]      Patient Name: Whitney Schofield  :1965  Age: 54 y.o.  Primary Cardiologist: Christiano Marques MD  Encounter Provider:  RAHAT Stephen      Chief Complaint:   Chief Complaint   Patient presents with   • History of tricuspid valve repair         HPI  Whitney Schofield is a 54 y.o. female who presents today for semiannual evaluation.     Pt has a  history significant for nonischemic cardiomyopathy, permanent pacemaker present, LUKAS.    Patient reports that she has done well over the past 6 months.  She reports that she has been stressed secondary to work and home situations.  She notes that her rheumatoid arthritis is starting to cause her more pain.  She states that her blood pressure still fluctuates and at times is elevated in the 160s-90s.  She states that when it is high she does take her extra dose of losartan and her blood pressure normalizes.  She states that she does not take this every day as sometimes she has lower readings as so she will need uses it as needed.  Patient reports that all of her symptoms are stable.  She still has chronic but stable episodes of lower extremity edema, fatigue, shortness of breath that is mainly with exertion.  She denies any symptoms of chest pain, heart palpitations.  Patient notes that she has lost approximately 6 pounds over the past several months.      The following portions of the patient's history were reviewed and updated as appropriate: allergies, current medications, past family history, past medical history, past social history, past surgical history and problem list.      ROS    OBJECTIVE:     Vitals:    20 1521   BP: 150/90   BP Location: Left arm   Pulse: 80   SpO2: 98%   Weight: 93.6 kg (206 lb 6.4 oz)   Height: 162.6 cm (64\")     Body mass index is 35.43 kg/m².    Physical Exam   Constitutional: She is oriented " to person, place, and time. Vital signs are normal. She appears well-developed and well-nourished. She is active.   HENT:   Head: Normocephalic and atraumatic.   Eyes: Conjunctivae are normal.   Neck: Carotid bruit is not present.   Cardiovascular: Normal rate, regular rhythm and normal heart sounds.   Pulmonary/Chest: Breath sounds normal. No respiratory distress.   Abdominal: Normal appearance.   Musculoskeletal:   No cyanosis, clubbing, edema  Normal ROM   Neurological: She is alert and oriented to person, place, and time. GCS eye subscore is 4. GCS verbal subscore is 5. GCS motor subscore is 6.   Skin: Skin is warm, dry and intact.   Psychiatric: She has a normal mood and affect. Her speech is normal and behavior is normal. Judgment and thought content normal. Cognition and memory are normal.       Procedures    Cardiac Procedures:  1. Status post closure of a large secundum ASD with a PeriPatch xenograft with concomitant tricuspid valve repair with a #32 MC3 annuloplasty ring by Dr. Jaime Harper on 5/6/2010 at [facility].    2. Left heart catheterization on 3/26/2010 showed angiographically normal coronary arteries.    3. Transesophageal echocardiogram on 6/21/2013 revealed an ejection fraction of 50-55%.    4. Status post placement of a dual chamber permanent pacemaker by Dr. Bush on 5/11/2010 following postoperative asystole and complete AV block after her ASD surgery.    5. Status post left ventricular lead placement and pacemaker replacement on 08/19/2013 by Dr. Aguilar. At that time she had a left ventricular lead added, to make this a biventricular pacemaker. The generator was also changed and is a Medtronic #[id number].    6. Status post right atrial lead revision on 2/19/2014. This was secondary to right atrial lead dislodgement and diaphragmatic stimulation. She did develop a left subclavian vein thrombosis following the procedure.    7. Status post laser lead extraction of the abandoned right atrial  lead on 4/17/2014 by Dr. Christiano Aguilar at [facility].    8. CHANDANA on 4/18/2016: Ejection fraction was 50%, the left ventricle was mildly dilated, there was grade 1 diastolic dysfunction, the ASD closure site was intact with no evidence for shunting, there was no evidence of vegetation or thrombus formation on the pacemaker leads, there was mild mitral regurgitation, and there was trace to mild tricuspid regurgitation through the annuloplasty ring.  9. Echocardiogram on 10/10/2018: There was akinesis of the basal to mid inferolateral wall.  Ejection fraction was 52%.  There was grade 1 diastolic dysfunction.  There was trace tricuspid regurgitation through the annuloplasty ring.  10. Lexiscan Myoview stress test on 10/10/2018: Normal with no ischemia.  11. Echocardiogram 2/12/2020.  LVEF 54%.  Mild pulmonic valve regurgitation.          ASSESSMENT:      Diagnosis Plan   1. Nonischemic cardiomyopathy (CMS/HCC)     2. Complete heart block (CMS/HCC)     3. Status post patch closure of ASD     4. Pacemaker           PLAN OF CARE:     1. Nonischemic cardiomyopathy.  Patient with symptoms that have been stable and are chronic for her.  She does continue to have shortness of breath with exertion as well as some generalized fatigue and lower extremity edema.  Overall she feels that she is doing at baseline.  Patient's last echocardiogram revealed an LVEF of 54% in February 2020.  She will continue with guideline directed therapy with metoprolol succinate as well as losartan.  2. History of complete heart block with permanent pacemaker present.  Reaching end of battery life.  Device clinic is monitoring closely.  3. History of patch closure to ASD.  4. Hypertension.  Patient's blood pressure elevated today at 150/90.  Patient states that she has not yet taken losartan today.  Patient reports that her blood pressure at times is elevated and at times is low.  She is doing well with her current regimen which includes clonidine  0.2 mg twice daily and metoprolol succinate 12.5 mg daily.  Patient uses Cozaar 25 mg as needed for high blood pressure readings and she was advised to take a dose today.  Patient verbalized her understanding and states that she feels she monitors her blood pressure very closely.  5. Patient will follow-up with Dr. Marques in 6 months.  Sooner for problems or complications.             Discharge Medications           Accurate as of July 9, 2020  3:35 PM. If you have any questions, ask your nurse or doctor.               Continue These Medications      Instructions Start Date   albuterol sulfate  (90 Base) MCG/ACT inhaler  Commonly known as:  PROVENTIL HFA;VENTOLIN HFA;PROAIR HFA   2 puffs, Inhalation, Every 4 Hours PRN, 2 puffs every 4 hours when necessary dyspnea      cloNIDine 0.2 MG tablet  Commonly known as:  CATAPRES   0.2 mg, Oral, 2 Times Daily, Take blood twice a day as needed for blood pressure over 180/90.      Ferrous Sulfate Dried 200 (65 Fe) MG tablet tablet   200 mg, Oral      fluticasone-salmeterol 115-21 MCG/ACT inhaler  Commonly known as:  Advair HFA   2 puffs, Inhalation, 2 Times Daily - RT      folic acid 1 MG tablet  Commonly known as:  FOLVITE   1 mg, Oral, Daily      HYDROcodone-acetaminophen  MG per tablet  Commonly known as:  NORCO   1 tablet, Oral, Every 6 Hours PRN      ipratropium-albuterol 0.5-2.5 mg/3 ml nebulizer  Commonly known as:  DUO-NEB   3 mL, Nebulization, Every 4 Hours PRN      levothyroxine 300 MCG tablet  Commonly known as:  SYNTHROID, LEVOTHROID   Oral, Daily, Takes 227 mcg total daily      losartan 25 MG tablet  Commonly known as:  COZAAR   25 mg, Oral, Daily PRN      metoprolol succinate XL 25 MG 24 hr tablet  Commonly known as:  TOPROL-XL   12.5 mg, Oral, Daily      omeprazole 40 MG capsule  Commonly known as:  priLOSEC   40 mg, Oral, Daily      vitamin D 1.25 MG (33414 UT) capsule capsule  Commonly known as:  ERGOCALCIFEROL   50,000 Units, Oral, Weekly,  MARION              Thank you for allowing me to participate in the care of your patient,         Madai RAHAT Vera  [facility]  07/09/20  3:35 PM      **Gamal Disclaimer:**  Much of this encounter note is an electronic transcription/translation of spoken language to printed text. The electronic translation of spoken language may permit erroneous, or at times, nonsensical words or phrases to be inadvertently transcribed. Although I have reviewed the note for such errors, some may still exist.

## 2020-09-02 ENCOUNTER — CLINICAL SUPPORT NO REQUIREMENTS (OUTPATIENT)
Dept: CARDIOLOGY | Facility: CLINIC | Age: 55
End: 2020-09-02

## 2020-09-02 DIAGNOSIS — I44.2 COMPLETE HEART BLOCK (HCC): Primary | ICD-10-CM

## 2020-09-02 PROCEDURE — 93281 PM DEVICE PROGR EVAL MULTI: CPT | Performed by: INTERNAL MEDICINE

## 2020-09-08 ENCOUNTER — TELEPHONE (OUTPATIENT)
Dept: FAMILY MEDICINE CLINIC | Facility: CLINIC | Age: 55
End: 2020-09-08

## 2020-09-08 DIAGNOSIS — I01.1 RHEUMATOID AORTITIS: Primary | ICD-10-CM

## 2020-09-08 NOTE — TELEPHONE ENCOUNTER
PATIENT HAS AN APPOINTMENT WITH HER RHEUMATOLOGIST TOMORROW. SHE HAS  PRIME AND REQUIRES A REFERRAL. THE INSURANCE COMPANY JUST LET HER KNOW TODAY THE REFERRAL WAS . SHE NEEDS A NEW ONE FOR HER APPOINTMENT.     PATIENT CALLBACK [phone]

## 2020-10-27 ENCOUNTER — CLINICAL SUPPORT NO REQUIREMENTS (OUTPATIENT)
Dept: CARDIOLOGY | Facility: CLINIC | Age: 55
End: 2020-10-27

## 2020-10-27 DIAGNOSIS — I44.2 COMPLETE HEART BLOCK (HCC): Primary | ICD-10-CM

## 2020-10-27 DIAGNOSIS — I42.8 NONISCHEMIC CARDIOMYOPATHY (HCC): ICD-10-CM

## 2020-10-27 PROCEDURE — 93288 INTERROG EVL PM/LDLS PM IP: CPT | Performed by: INTERNAL MEDICINE

## 2020-12-01 ENCOUNTER — CLINICAL SUPPORT NO REQUIREMENTS (OUTPATIENT)
Dept: CARDIOLOGY | Facility: CLINIC | Age: 55
End: 2020-12-01

## 2020-12-01 DIAGNOSIS — I42.8 OTHER CARDIOMYOPATHY (HCC): Primary | ICD-10-CM

## 2020-12-21 ENCOUNTER — TELEPHONE (OUTPATIENT)
Dept: FAMILY MEDICINE CLINIC | Facility: CLINIC | Age: 55
End: 2020-12-21

## 2020-12-21 NOTE — TELEPHONE ENCOUNTER
Caller: Whitney Schofield GABRIELLA    Relationship: Self    Best call back number:[phone]     What orders are you requesting (i.e. lab or imaging): CARDIOLOGY REFERRAL RENEWED.     In what timeframe would the patient need to come in: BEFORE December 30, 2020    Where will you receive your lab/imaging services: [facility]    Additional notes: MS. SCHOFIELD IS NEEDING HER CARDIOLOGY REFERRAL RENEWED SHE SEES DR. LOREE RODRIGUEZ. MS. SCHOFIELD SAYS THE CURRENT  REFERRAL EXPIRES 12/31/2020 SHE IS NEEDING THIS UPDATED. SHE HAS [facility] [employer] INSURANCE

## 2020-12-21 NOTE — TELEPHONE ENCOUNTER
Caller: Whitney Schofield    Relationship to patient: Self    Best call back number: [phone]     Patient is needing: Patient called in and wanted to know if she could get a referral renewed  For her endocrinologist dr. Jacobs .

## 2020-12-29 ENCOUNTER — OFFICE VISIT (OUTPATIENT)
Dept: CARDIOLOGY | Facility: CLINIC | Age: 55
End: 2020-12-29

## 2020-12-29 ENCOUNTER — TELEPHONE (OUTPATIENT)
Dept: CARDIOLOGY | Facility: CLINIC | Age: 55
End: 2020-12-29

## 2020-12-29 ENCOUNTER — CLINICAL SUPPORT NO REQUIREMENTS (OUTPATIENT)
Dept: CARDIOLOGY | Facility: CLINIC | Age: 55
End: 2020-12-29

## 2020-12-29 VITALS
HEIGHT: 64 IN | WEIGHT: 203.6 LBS | SYSTOLIC BLOOD PRESSURE: 122 MMHG | OXYGEN SATURATION: 99 % | HEART RATE: 73 BPM | BODY MASS INDEX: 34.76 KG/M2 | DIASTOLIC BLOOD PRESSURE: 78 MMHG

## 2020-12-29 DIAGNOSIS — Z87.74 STATUS POST PATCH CLOSURE OF ASD: Primary | ICD-10-CM

## 2020-12-29 DIAGNOSIS — I44.2 COMPLETE HEART BLOCK (HCC): Primary | ICD-10-CM

## 2020-12-29 DIAGNOSIS — I44.2 THIRD DEGREE HEART BLOCK (HCC): ICD-10-CM

## 2020-12-29 DIAGNOSIS — I42.8 NONISCHEMIC CARDIOMYOPATHY (HCC): Primary | ICD-10-CM

## 2020-12-29 DIAGNOSIS — I50.42 CHRONIC COMBINED SYSTOLIC AND DIASTOLIC CONGESTIVE HEART FAILURE (HCC): ICD-10-CM

## 2020-12-29 DIAGNOSIS — Z95.0 HISTORY OF PERMANENT CARDIAC PACEMAKER PLACEMENT: ICD-10-CM

## 2020-12-29 DIAGNOSIS — I42.8 NICM (NONISCHEMIC CARDIOMYOPATHY) (HCC): ICD-10-CM

## 2020-12-29 DIAGNOSIS — Z98.890 STATUS POST TRICUSPID VALVE REPAIR: ICD-10-CM

## 2020-12-29 PROCEDURE — 99213 OFFICE O/P EST LOW 20 MIN: CPT | Performed by: INTERNAL MEDICINE

## 2020-12-29 PROCEDURE — 93288 INTERROG EVL PM/LDLS PM IP: CPT | Performed by: INTERNAL MEDICINE

## 2020-12-29 NOTE — PROGRESS NOTES
Date of Office Visit:  2020  Encounter Provider: Lion Marques MD  Place of Service: [facility] CARDIOLOGY  Patient Name: Whitney Schofield  :1965    Chief complaint: Follow-up for open ASD closure, nonischemic cardiomyopathy,   chronic combined CHF, tricuspid valve repair, and pacemaker placement.    History of Present Illness:    I again had the pleasure of seeing your patient in cardiology office on 2020. As  you well know, she is a very pleasant 55 year old white female with a past history  significant for multiple cardiovascular issues who presents for follow-up. The patient   originally presented to [facility] with symptoms of palpitations and  shortness of breath in 2009. She subsequently was diagnosed with Hashimoto  thyroiditis, and underwent a partial thyroidectomy in 2010. As part of a routine  follow-up, she had a two-dimensional echocardiogram performed on 3/1/2010 which   revealed a severely dilated right ventricle and significant pulmonary hypertension. A CT   scan of her chest did not show any evidence for pulmonary embolus or other acute lung   pathology.  She underwent a transesophageal echocardiogram on 3/16/2010 which   revealed a large secundum ASD high in the atrial septum. 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 She was referred to   Dr. Harper for evaluation to surgically repair the ASD, and underwent a preoperative   cardiac catheterization on 3/26/2010. This revealed angiographically normal coronary   arteries, with a Qp:Qs of 2.3, consistent with a large shunt. The patient underwent   closure of the ASD, as well as tricuspid valve repair with an annuloplasty ring by Dr. Harper at [facility] on 2010. Her postoperative period was complicated by   complete AV block and accelerated junctional escape rhythm. She underwent   placement of a permanent  "pacemaker on 5/11/2010.      The patient continued to have significant shortness of breath and a 24-hour Holter   monitor and two-dimensional echocardiogram were obtained on 5/12/2011. Her   Holter monitor did show several brief episodes of tachycardia. The echocardiogram   did not show any significant findings other than a \"shuttering motion\" of the septum.   She underwent a right and left heart catheterization on 7/20/2011 to assess for any   evidence of constriction. There was no evidence of restriction or constriction on the   heart catheterization, although she did have pulmonary hypertension with a mean   pulmonary artery pressure of 30 mmHg. A transesophageal echocardiogram was also   performed on 6/20/2011 which revealed an ejection fraction of 55-60%, and an intact   ASD repair. An electrophysiology study was performed by Dr. Foster on 8/1/2011.   This was performed through her pacemaker in conjunction with an isoproterenol   infusion. This showed no inducible tachycardia or ventricular tachycardia.       The patient eventually wore a 21-day event recorder and was noted to go into   wide-complex tachycardia on 9/30/2012. She had multiple other episodes as well.   Dr. Aguilar from Electrophysiology saw the patient and felt this represented ventricular   tachycardia. She did ultimately undergo a repeat electrophysiology study on   11/20/2012 by Dr. Aguilar which confirmed complete infra-hisian block with no   conduction retrograde or antegrade. No ventricular tachycardia was induced. She   was started on beta blockers at that time.       At the patient's visit in 3/2013 she complained of severe volume retention and edema.    She was also more short of breath and had gained a significant amount of weight.   She was admitted to the hospital on 3/25/2013 and underwent a transesophageal   echocardiogram.  Her ejection fraction had decreased significantly to 35%, and there   was an extensive amount of thrombus formation " on her pacemaker wires. Her ASD   repair appeared to be in good condition. She did undergo a CT scan of the chest   which showed no evidence for pulmonary embolism, as well as a CT scan of the   abdomen which showed no evidence for inferior vena cava obstruction or clotting.   She was diuresed extensively and placed on Coumadin at that time. 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 She ultimately underwent left   ventricular lead placement and pacemaker replacement on 8/19/2013 by Dr. Aguilar.   She then had right atrial lead dislodgement and diaphragmatic stimulation. A right   atrial lead revision was performed on 2/19/2014; however, the patient developed a   significant left subclavian vein thrombosis post procedurally. She again was placed   on Coumadin at that time, which was then switched to Xarelto. She ultimately   underwent laser lead extraction of the abandoned right atrial lead on 4/17/2014. She   did undergo a right heart cath on 4/13/2015 which showed only mild pulmonary   hypertension (mean PA pressure 29 mm Hg).      The patient presents today for follow-up.  Her pacemaker has reached end-of-life,   and will need replacement.  She has had 3 episodes within the last several months   of a light sensation around the periphery of her eyes.  She states this lasts for about   an hour, and always occurs with the same symptoms.  She is going to need her left   shoulder replaced because of her rheumatoid arthritis, and this continues to be her   main issue.  She has had minimal edema, and is not taking any loop diuretics.  She   denied any chest pain.  Her shortness of breath occurs with moderate activity, and   is at baseline and unchanged.      Past Medical History:   Diagnosis Date   • Anemia    • ASD (atrial septal defect)    • Asthma    • Asystole (CMS/HCC)     Post ASD closure asystole and high-grade AV block.  s/p pacemaker 5/11/10.   • Celiac disease    • DVT (deep venous  thrombosis) (CMS/HCC)     Left subclavian DVT following right atrial pacemaker lead revision in 2/14   • Edema    • Hashimoto's thyroiditis     s/p partial thyroidectomy 1/13/10   • Heart palpitations    • Hx of thyroid cancer    • Malignant neoplasm of thyroid gland (CMS/HCC)     Papillary carcinoma of the thyroid - s/p residual thyroidectomy in 2/11.   • Migraine    • Nonischemic cardiomyopathy (CMS/HCC)     EF as low as 35% 3/13.  EF 6/21/13 by CHANDANA was 50-55%, and 53% by echo on 6/17/14.  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  EF 50% by CHANDANA on 4/18/16.   • NSVT (nonsustained ventricular tachycardia) (CMS/HCC)     Noted by event recorder 9/12.  EP study by Dr. Aguilar on 11/20/12 showed no inducible VT.   • LUKAS on CPAP    • Papillary thyroid carcinoma (CMS/HCC) 08/27/2013   • Rheumatoid arthritis (CMS/HCC)     Severe and debilitating    • Secundum ASD     s/p open closure with a PeriPatch xenograft by Dr. Harper on 5/6/10 by Dr. Harper at [facility].   • Sleep apnea    • SOB (shortness of breath)     Multifactorial    • Thrombus due to any device, implant or graft     Extensive thrombus formation on the pacemaker leads by CHANDANA 3/25/13.  Initiated on Coumadin transiently at that time - improved significantly by CHANDANA 6/21/13.       Past Surgical History:   Procedure Laterality Date   • ASD AND VSD REPAIR     • CHOLECYSTECTOMY  2007   • ENDOSCOPY AND COLONOSCOPY  12/30/2015    Tom Orta MD   • EXCISION MASS TRUNK Right 7/20/2018    Procedure: EXCISION MASS TRUNK, RUQ ABD WALL LIPOMA;  Surgeon: Eduar Marx MD;  Location: [facility] OR [facility];  Service: General   • PACEMAKER IMPLANTATION  05/2010    Inital dual-chamber pacemaker placed 5/11/10 after ASD closure surgery.  Had LV lead placement and generator change on 8/19/13 by Dr. Aguilar ([employer] #[id number]).  Then had right atrial lead revision on 2/19/14 (for right atrial lead dislodgement and diaphragmatic stimulation) - developed left subclavian  DVT afterwards.  Then had laser lead extraction of the abandoned right atrial lead on 4/17/14.   • REPLACEMENT TOTAL KNEE BILATERAL     • SHOULDER SURGERY Right    • THYROID LOBECTOMY Right 2012   • THYROIDECTOMY Left 2011    Dr. Coronado, radioactive iodine X3   • TONSILLECTOMY     • TRICUSPID VALVE SURGERY      #32 MC3 annuloplasty ring by Dr. Harper on 5/6/10 (at time of ASD closure)       Current Outpatient Medications on File Prior to Visit   Medication Sig Dispense Refill   • albuterol sulfate  (90 Base) MCG/ACT inhaler Inhale 2 puffs Every 4 (Four) Hours As Needed for Wheezing. 2 puffs every 4 hours when necessary dyspnea 1 inhaler 1   • cloNIDine (CATAPRES) 0.2 MG tablet Take 1 tablet by mouth 2 (Two) Times a Day. Take blood twice a day as needed for blood pressure over 180/90. 14 tablet 0   • Ferrous Sulfate Dried 200 (65 FE) MG tablet Take 200 mg by mouth.     • fluticasone-salmeterol (ADVAIR HFA) 115-21 MCG/ACT inhaler Inhale 2 puffs 2 (Two) Times a Day. 1 inhaler 0   • folic acid (FOLVITE) 1 MG tablet Take 1 mg by mouth daily.     • HYDROcodone-acetaminophen (NORCO)  MG per tablet Take 1 tablet by mouth Every 6 (Six) Hours As Needed.     • ipratropium-albuterol (DUO-NEB) 0.5-2.5 mg/3 ml nebulizer Take 3 mL by nebulization Every 4 (Four) Hours As Needed for Wheezing or Shortness of Air. 360 mL 0   • levothyroxine (SYNTHROID, LEVOTHROID) 300 MCG tablet Take  by mouth Daily. Takes 227 mcg total daily  0   • losartan (COZAAR) 25 MG tablet Take 25 mg by mouth Daily As Needed.     • metoprolol succinate XL (TOPROL-XL) 25 MG 24 hr tablet Take 0.5 tablets by mouth Daily. 45 tablet 3   • omeprazole (priLOSEC) 40 MG capsule Take 40 mg by mouth Daily.     • vitamin D (ERGOCALCIFEROL) 05737 units capsule capsule Take 50,000 Units by mouth 1 (One) Time Per Week. SUNDAY       No current facility-administered medications on file prior to visit.      Allergies as of 12/29/2020 - Reviewed 12/29/2020   Allergen  "Reaction Noted   • Ferumoxytol  02/10/2017   • Azithromycin Rash 06/02/2012     Social History     Socioeconomic History   • Marital status:      Spouse name: Not on file   • Number of children: 2   • Years of education: Not on file   • Highest education level: Not on file   Occupational History   • Occupation:    Tobacco Use   • Smoking status: Never Smoker   • Smokeless tobacco: Never Used   Substance and Sexual Activity   • Alcohol use: No   • Drug use: No   • Sexual activity: Yes     Partners: Male     Family History   Problem Relation Age of Onset   • COPD Mother    • Heart failure Mother         CHF   • Lung disease Mother    • Hypertension Mother    • Diabetes Mother    • Coronary artery disease Mother    • Diabetes Sister    • Breast cancer Maternal Aunt    • Malig Hyperthermia Neg Hx        Review of Systems   Constitution: Positive for malaise/fatigue.   Cardiovascular: Positive for dyspnea on exertion.   Respiratory: Positive for shortness of breath.    Musculoskeletal: Positive for joint pain and joint swelling.   Genitourinary: Positive for frequency.   Neurological: Positive for excessive daytime sleepiness and light-headedness.   All other systems reviewed and are negative.     Objective:     Vitals:    12/29/20 0832   BP: 122/78   Pulse: 73   SpO2: 99%   Weight: 92.4 kg (203 lb 9.6 oz)   Height: 162.6 cm (64.02\")     Body mass index is 34.93 kg/m².    Physical Exam   Constitutional: She is oriented to person, place, and time. She appears well-developed and well-nourished.   HENT:   Head: Normocephalic and atraumatic.   Eyes: Conjunctivae are normal.   Neck: Neck supple.   Cardiovascular: Normal rate and regular rhythm. Exam reveals no friction rub.   Murmur heard.   Systolic murmur is present with a grade of 2/6 at the upper left sternal border and lower left sternal border.  Pulmonary/Chest: Effort normal and breath sounds normal. She has no rales.   Abdominal: Soft. There is " no abdominal tenderness.   Musculoskeletal:      Comments: Trace edema of the lower extremities bilaterally    Neurological: She is alert and oriented to person, place, and time.   Skin: Skin is warm.   Psychiatric: She has a normal mood and affect. Her behavior is normal.     Lab Review:   Procedures    Cardiac Procedures:  1. Status post closure of a large secundum ASD with a PeriPatch xenograft with concomitant  tricuspid valve repair with a #32 MC3 annuloplasty ring by Dr. Jaime Harper on 5/6/2010   at [facility].    2. Left heart catheterization on 3/26/2010 showed angiographically normal coronary  arteries.    3. Transesophageal echocardiogram on 6/21/2013 revealed an ejection fraction of 50-55%.    4. Status post placement of a dual chamber permanent pacemaker by Dr. Bush on   5/11/2010 following postoperative asystole and complete AV block after her ASD surgery.    5. Status post left ventricular lead placement and pacemaker replacement on 08/19/2013 by  Dr. Aguilar. At that time she had a left ventricular lead added, to make this a  biventricular pacemaker. The generator was also changed and is a Neo PLMtronic #[id number].    6. Status post right atrial lead revision on 2/19/2014. This was secondary to right atrial   lead dislodgement and diaphragmatic stimulation. She did develop a left subclavian vein   thrombosis following the procedure.    7. Status post laser lead extraction of the abandoned right atrial lead on 4/17/2014 by Dr. Christiano Aguilar at [facility].    8. CHANDANA on 4/18/2016: Ejection fraction was 50%, the left ventricle was mildly dilated, there   was grade 1 diastolic dysfunction, the ASD closure site was intact with no evidence for   shunting, there was no evidence of vegetation or thrombus formation on the pacemaker   leads, there was mild mitral regurgitation, and there was trace to mild tricuspid regurgitation   through the annuloplasty ring.  10.  Echocardiogram on 10/10/2018: There was  akinesis of the basal to mid inferolateral   wall.  Ejection fraction was 52%.  There was grade 1 diastolic dysfunction.  There was   trace tricuspid regurgitation through the annuloplasty ring.  11.  Lexiscan Myoview stress test on 10/10/2018: Normal with no ischemia.  12.  Echocardiogram on 2/12/2020: The ejection fraction was 54%.  The tricuspid valve   repair was normal.  There was trace tricuspid regurgitation.      Assessment:       Diagnosis Plan   1. Status post patch closure of ASD     2. NICM (nonischemic cardiomyopathy) (CMS/HCC)     3. Chronic combined systolic and diastolic congestive heart failure (CMS/HCC)     4. Status post tricuspid valve repair     5. Third degree heart block (CMS/HCC)     6. History of permanent cardiac pacemaker placement       Plan:       Overall, she is doing fairly well.  Her main issue continues to be the rheumatoid arthritis.  She is going to need her left shoulder replaced after her pacemaker is replaced.  She has had several vision of flashing lights around her visual periphery.  I told her that she needs to see an ophthalmologist as soon as possible.  I do not feel this represents a TIA or neurological phenomenon, although I do know that retinal issues can sometimes cause this.  I want to make sure that she is not having a partial retinal detachment.  I encouraged her strongly to make an appointment with ophthalmology.    She will continue on the Toprol-XL and losartan.  Her blood pressure is excellent.  She is not taking any loop diuretics at the current time, and her edema has been minimal.  Her last echocardiogram on 2/12/2020 looked good with a normal ejection fraction of 54%.  She has not had any issues with the ASD closure since it was performed years ago.  She is going to need her pacemaker battery changed, and I will make sure that this occurs.  A message has also been sent to Dr. Mcdowell in our group.  She is pacemaker dependent from third-degree heart block  resulting from her cardiac surgery.  She will follow up after her pacemaker is changed, and I will personally see her back in the office in 6 months.

## 2021-01-04 ENCOUNTER — TRANSCRIBE ORDERS (OUTPATIENT)
Dept: CARDIOLOGY | Facility: CLINIC | Age: 56
End: 2021-01-04

## 2021-01-04 DIAGNOSIS — Z01.810 PRE-OPERATIVE CARDIOVASCULAR EXAMINATION: Primary | ICD-10-CM

## 2021-01-04 DIAGNOSIS — Z13.6 SCREENING FOR ISCHEMIC HEART DISEASE: ICD-10-CM

## 2021-01-11 ENCOUNTER — TRANSCRIBE ORDERS (OUTPATIENT)
Dept: ADMINISTRATIVE | Facility: HOSPITAL | Age: 56
End: 2021-01-11

## 2021-01-11 DIAGNOSIS — Z01.818 OTHER SPECIFIED PRE-OPERATIVE EXAMINATION: Primary | ICD-10-CM

## 2021-01-14 ENCOUNTER — LAB (OUTPATIENT)
Dept: LAB | Facility: HOSPITAL | Age: 56
End: 2021-01-14

## 2021-01-14 DIAGNOSIS — Z13.6 SCREENING FOR ISCHEMIC HEART DISEASE: ICD-10-CM

## 2021-01-14 DIAGNOSIS — Z01.810 PRE-OPERATIVE CARDIOVASCULAR EXAMINATION: ICD-10-CM

## 2021-01-14 LAB
ANION GAP SERPL CALCULATED.3IONS-SCNC: 11.6 MMOL/L (ref 5–15)
BASOPHILS # BLD AUTO: 0.02 10*3/MM3 (ref 0–0.2)
BASOPHILS NFR BLD AUTO: 0.3 % (ref 0–1.5)
BUN SERPL-MCNC: 15 MG/DL (ref 6–20)
BUN/CREAT SERPL: 17.6 (ref 7–25)
CALCIUM SPEC-SCNC: 9 MG/DL (ref 8.6–10.5)
CHLORIDE SERPL-SCNC: 103 MMOL/L (ref 98–107)
CO2 SERPL-SCNC: 25.4 MMOL/L (ref 22–29)
CREAT SERPL-MCNC: 0.85 MG/DL (ref 0.57–1)
DEPRECATED RDW RBC AUTO: 44.3 FL (ref 37–54)
EOSINOPHIL # BLD AUTO: 0.13 10*3/MM3 (ref 0–0.4)
EOSINOPHIL NFR BLD AUTO: 1.8 % (ref 0.3–6.2)
ERYTHROCYTE [DISTWIDTH] IN BLOOD BY AUTOMATED COUNT: 15.8 % (ref 12.3–15.4)
GFR SERPL CREATININE-BSD FRML MDRD: 69 ML/MIN/1.73
GLUCOSE SERPL-MCNC: 109 MG/DL (ref 65–99)
HCT VFR BLD AUTO: 38.4 % (ref 34–46.6)
HGB BLD-MCNC: 12.6 G/DL (ref 12–15.9)
IMM GRANULOCYTES # BLD AUTO: 0.03 10*3/MM3 (ref 0–0.05)
IMM GRANULOCYTES NFR BLD AUTO: 0.4 % (ref 0–0.5)
LYMPHOCYTES # BLD AUTO: 1.18 10*3/MM3 (ref 0.7–3.1)
LYMPHOCYTES NFR BLD AUTO: 16.3 % (ref 19.6–45.3)
MCH RBC QN AUTO: 26.3 PG (ref 26.6–33)
MCHC RBC AUTO-ENTMCNC: 32.8 G/DL (ref 31.5–35.7)
MCV RBC AUTO: 80 FL (ref 79–97)
MONOCYTES # BLD AUTO: 0.33 10*3/MM3 (ref 0.1–0.9)
MONOCYTES NFR BLD AUTO: 4.5 % (ref 5–12)
NEUTROPHILS NFR BLD AUTO: 5.57 10*3/MM3 (ref 1.7–7)
NEUTROPHILS NFR BLD AUTO: 76.7 % (ref 42.7–76)
NRBC BLD AUTO-RTO: 0 /100 WBC (ref 0–0.2)
PLATELET # BLD AUTO: 316 10*3/MM3 (ref 140–450)
PMV BLD AUTO: 9.6 FL (ref 6–12)
POTASSIUM SERPL-SCNC: 3.9 MMOL/L (ref 3.5–5.2)
RBC # BLD AUTO: 4.8 10*6/MM3 (ref 3.77–5.28)
SODIUM SERPL-SCNC: 140 MMOL/L (ref 136–145)
WBC # BLD AUTO: 7.26 10*3/MM3 (ref 3.4–10.8)

## 2021-01-14 PROCEDURE — 85025 COMPLETE CBC W/AUTO DIFF WBC: CPT

## 2021-01-14 PROCEDURE — 80048 BASIC METABOLIC PNL TOTAL CA: CPT

## 2021-01-14 PROCEDURE — 36415 COLL VENOUS BLD VENIPUNCTURE: CPT

## 2021-01-15 ENCOUNTER — LAB (OUTPATIENT)
Dept: LAB | Facility: HOSPITAL | Age: 56
End: 2021-01-15

## 2021-01-15 DIAGNOSIS — Z01.818 OTHER SPECIFIED PRE-OPERATIVE EXAMINATION: ICD-10-CM

## 2021-01-15 PROCEDURE — U0004 COV-19 TEST NON-CDC HGH THRU: HCPCS

## 2021-01-15 PROCEDURE — C9803 HOPD COVID-19 SPEC COLLECT: HCPCS

## 2021-01-16 LAB — SARS-COV-2 RNA RESP QL NAA+PROBE: NOT DETECTED

## 2021-01-18 ENCOUNTER — HOSPITAL ENCOUNTER (OUTPATIENT)
Facility: HOSPITAL | Age: 56
Setting detail: HOSPITAL OUTPATIENT SURGERY
Discharge: HOME | End: 2021-01-18
Attending: INTERNAL MEDICINE | Admitting: INTERNAL MEDICINE

## 2021-01-18 ENCOUNTER — PATIENT MESSAGE (OUTPATIENT)
Dept: CARDIOLOGY | Facility: CLINIC | Age: 56
End: 2021-01-18

## 2021-01-18 VITALS
OXYGEN SATURATION: 98 % | HEART RATE: 64 BPM | SYSTOLIC BLOOD PRESSURE: 111 MMHG | HEIGHT: 65 IN | RESPIRATION RATE: 18 BRPM | DIASTOLIC BLOOD PRESSURE: 60 MMHG | BODY MASS INDEX: 33.82 KG/M2 | TEMPERATURE: 98 F | WEIGHT: 203 LBS

## 2021-01-18 DIAGNOSIS — I42.8 NONISCHEMIC CARDIOMYOPATHY (HCC): ICD-10-CM

## 2021-01-18 PROCEDURE — 25010000003 CEFAZOLIN IN DEXTROSE 2-4 GM/100ML-% SOLUTION: Performed by: INTERNAL MEDICINE

## 2021-01-18 PROCEDURE — 25010000003 LIDOCAINE 1 % SOLUTION: Performed by: INTERNAL MEDICINE

## 2021-01-18 PROCEDURE — 33229 REMV&REPLC PM GEN MULT LEADS: CPT | Performed by: INTERNAL MEDICINE

## 2021-01-18 PROCEDURE — 25010000002 FENTANYL CITRATE (PF) 100 MCG/2ML SOLUTION: Performed by: INTERNAL MEDICINE

## 2021-01-18 PROCEDURE — 99152 MOD SED SAME PHYS/QHP 5/>YRS: CPT | Performed by: INTERNAL MEDICINE

## 2021-01-18 PROCEDURE — 25010000002 MIDAZOLAM PER 1 MG: Performed by: INTERNAL MEDICINE

## 2021-01-18 PROCEDURE — 99153 MOD SED SAME PHYS/QHP EA: CPT | Performed by: INTERNAL MEDICINE

## 2021-01-18 PROCEDURE — C2621 PMKR, OTHER THAN SING/DUAL: HCPCS | Performed by: INTERNAL MEDICINE

## 2021-01-18 DEVICE — GEN PM PERCEPTA MRI CRTP: Type: IMPLANTABLE DEVICE | Status: FUNCTIONAL

## 2021-01-18 RX ORDER — SODIUM CHLORIDE 0.9 % (FLUSH) 0.9 %
10 SYRINGE (ML) INJECTION AS NEEDED
Status: DISCONTINUED | OUTPATIENT
Start: 2021-01-18 | End: 2021-01-18 | Stop reason: HOSPADM

## 2021-01-18 RX ORDER — SODIUM CHLORIDE 9 MG/ML
75 INJECTION, SOLUTION INTRAVENOUS CONTINUOUS
Status: DISCONTINUED | OUTPATIENT
Start: 2021-01-18 | End: 2021-01-18 | Stop reason: HOSPADM

## 2021-01-18 RX ORDER — LEVOTHYROXINE SODIUM 0.2 MG/1
200 TABLET ORAL DAILY
COMMUNITY
End: 2021-03-04

## 2021-01-18 RX ORDER — MIDAZOLAM HYDROCHLORIDE 1 MG/ML
INJECTION INTRAMUSCULAR; INTRAVENOUS AS NEEDED
Status: DISCONTINUED | OUTPATIENT
Start: 2021-01-18 | End: 2021-01-18 | Stop reason: HOSPADM

## 2021-01-18 RX ORDER — FENTANYL CITRATE 50 UG/ML
INJECTION, SOLUTION INTRAMUSCULAR; INTRAVENOUS AS NEEDED
Status: DISCONTINUED | OUTPATIENT
Start: 2021-01-18 | End: 2021-01-18 | Stop reason: HOSPADM

## 2021-01-18 RX ORDER — SODIUM CHLORIDE 0.9 % (FLUSH) 0.9 %
3 SYRINGE (ML) INJECTION EVERY 12 HOURS SCHEDULED
Status: DISCONTINUED | OUTPATIENT
Start: 2021-01-18 | End: 2021-01-18 | Stop reason: HOSPADM

## 2021-01-18 RX ORDER — CEFAZOLIN SODIUM 2 G/100ML
INJECTION, SOLUTION INTRAVENOUS CONTINUOUS PRN
Status: COMPLETED | OUTPATIENT
Start: 2021-01-18 | End: 2021-01-18

## 2021-01-18 RX ORDER — LIDOCAINE HYDROCHLORIDE 10 MG/ML
0.1 INJECTION, SOLUTION EPIDURAL; INFILTRATION; INTRACAUDAL; PERINEURAL ONCE AS NEEDED
Status: DISCONTINUED | OUTPATIENT
Start: 2021-01-18 | End: 2021-01-18 | Stop reason: HOSPADM

## 2021-01-18 RX ORDER — LIDOCAINE HYDROCHLORIDE 10 MG/ML
INJECTION, SOLUTION INFILTRATION; PERINEURAL AS NEEDED
Status: DISCONTINUED | OUTPATIENT
Start: 2021-01-18 | End: 2021-01-18 | Stop reason: HOSPADM

## 2021-01-18 RX ADMIN — SODIUM CHLORIDE 75 ML/HR: 9 INJECTION, SOLUTION INTRAVENOUS at 06:58

## 2021-01-20 NOTE — TELEPHONE ENCOUNTER
Spoke with Ms. Schofield and advised her that this was an error that is being corrected and advised her that if she received any other correspondence that she needed clarification on she should call the office and ask for Radhika Lugo or myself and we would be happy to help her in any way.     Thank you,  Mattie Ralph

## 2021-01-21 RX ORDER — METHYLPREDNISOLONE 4 MG/1
TABLET ORAL
Qty: 21 TABLET | Refills: 0 | Status: SHIPPED | OUTPATIENT
Start: 2021-01-21

## 2021-01-28 ENCOUNTER — CLINICAL SUPPORT NO REQUIREMENTS (OUTPATIENT)
Dept: CARDIOLOGY | Facility: CLINIC | Age: 56
End: 2021-01-28

## 2021-01-28 DIAGNOSIS — I42.8 NONISCHEMIC CARDIOMYOPATHY (HCC): Primary | ICD-10-CM

## 2021-01-28 PROCEDURE — 93284 PRGRMG EVAL IMPLANTABLE DFB: CPT | Performed by: INTERNAL MEDICINE

## 2021-02-05 ENCOUNTER — TELEPHONE (OUTPATIENT)
Dept: FAMILY MEDICINE CLINIC | Facility: CLINIC | Age: 56
End: 2021-02-05

## 2021-02-05 NOTE — TELEPHONE ENCOUNTER
JUAN Chapa says her son developed a cough and runny nose 3 days ago, yesterday he developed a fever up to 100.9, this morning he began to C/O ear pain, she has been giving him tylenol with some relief, no wheezing, behavior is normal and intake/output is normal, I told her I would have to send you a message to see if you could squeeze him in and she replied that she has another child with the same symptoms and wanted to have them both checked so she is just going to bring them in to urgent care.   PATIENT IS NEEDING HER REFERRAL RENEWED FOR DR CASPER AS SHE IS HAVING SHOULDER SURGERY 03/16/21    PLEASE ADVISE  [phone]

## 2021-02-06 DIAGNOSIS — M25.519 SHOULDER PAIN, UNSPECIFIED CHRONICITY, UNSPECIFIED LATERALITY: Primary | ICD-10-CM

## 2021-03-04 ENCOUNTER — OFFICE VISIT (OUTPATIENT)
Dept: FAMILY MEDICINE CLINIC | Facility: CLINIC | Age: 56
End: 2021-03-04

## 2021-03-04 VITALS
HEIGHT: 65 IN | DIASTOLIC BLOOD PRESSURE: 70 MMHG | SYSTOLIC BLOOD PRESSURE: 118 MMHG | HEART RATE: 84 BPM | TEMPERATURE: 97.3 F | WEIGHT: 204 LBS | OXYGEN SATURATION: 97 % | RESPIRATION RATE: 20 BRPM | BODY MASS INDEX: 33.99 KG/M2

## 2021-03-04 DIAGNOSIS — Z00.00 WELLNESS EXAMINATION: Primary | ICD-10-CM

## 2021-03-04 DIAGNOSIS — E03.9 HYPOTHYROIDISM, UNSPECIFIED TYPE: Primary | ICD-10-CM

## 2021-03-04 LAB
25(OH)D3 SERPL-MCNC: 28.5 NG/ML (ref 30–100)
ALBUMIN SERPL-MCNC: 4.2 G/DL (ref 3.5–5.2)
ALBUMIN/GLOB SERPL: 1.4 G/DL
ALP SERPL-CCNC: 152 U/L (ref 39–117)
ALT SERPL W P-5'-P-CCNC: 16 U/L (ref 1–33)
ANION GAP SERPL CALCULATED.3IONS-SCNC: 10.2 MMOL/L (ref 5–15)
AST SERPL-CCNC: 17 U/L (ref 1–32)
BACTERIA UR QL AUTO: ABNORMAL /HPF
BILIRUB SERPL-MCNC: 0.4 MG/DL (ref 0–1.2)
BILIRUB UR QL STRIP: NEGATIVE
BUN SERPL-MCNC: 16 MG/DL (ref 6–20)
BUN/CREAT SERPL: 20.3 (ref 7–25)
CALCIUM SPEC-SCNC: 9 MG/DL (ref 8.6–10.5)
CHLORIDE SERPL-SCNC: 101 MMOL/L (ref 98–107)
CHOLEST SERPL-MCNC: 173 MG/DL (ref 0–200)
CLARITY UR: CLEAR
CO2 SERPL-SCNC: 27.8 MMOL/L (ref 22–29)
COLOR UR: YELLOW
CREAT SERPL-MCNC: 0.79 MG/DL (ref 0.57–1)
DEVELOPER EXPIRATION DATE: NORMAL
DEVELOPER LOT NUMBER: NORMAL
ERYTHROCYTE [DISTWIDTH] IN BLOOD BY AUTOMATED COUNT: 18.1 % (ref 12.3–15.4)
EXPIRATION DATE: NORMAL
FECAL OCCULT BLOOD SCREEN, POC: NEGATIVE
GFR SERPL CREATININE-BSD FRML MDRD: 75 ML/MIN/1.73
GLOBULIN UR ELPH-MCNC: 3.1 GM/DL
GLUCOSE SERPL-MCNC: 103 MG/DL (ref 65–99)
GLUCOSE UR STRIP-MCNC: NEGATIVE MG/DL
HCT VFR BLD AUTO: 40.4 % (ref 34–46.6)
HDLC SERPL-MCNC: 49 MG/DL (ref 40–60)
HGB BLD-MCNC: 13 G/DL (ref 12–15.9)
HGB UR QL STRIP.AUTO: ABNORMAL
KETONES UR QL STRIP: NEGATIVE
LDLC SERPL CALC-MCNC: 108 MG/DL (ref 0–100)
LDLC/HDLC SERPL: 2.19 {RATIO}
LEUKOCYTE ESTERASE UR QL STRIP.AUTO: ABNORMAL
LYMPHOCYTES # BLD AUTO: 1.3 10*3/MM3 (ref 0.7–3.1)
LYMPHOCYTES NFR BLD AUTO: 15.8 % (ref 19.6–45.3)
Lab: NORMAL
MCH RBC QN AUTO: 26.3 PG (ref 26.6–33)
MCHC RBC AUTO-ENTMCNC: 32.1 G/DL (ref 31.5–35.7)
MCV RBC AUTO: 81.7 FL (ref 79–97)
MONOCYTES # BLD AUTO: 0.4 10*3/MM3 (ref 0.1–0.9)
MONOCYTES NFR BLD AUTO: 5.3 % (ref 5–12)
NEGATIVE CONTROL: NEGATIVE
NEUTROPHILS NFR BLD AUTO: 6.6 10*3/MM3 (ref 1.7–7)
NEUTROPHILS NFR BLD AUTO: 78.9 % (ref 42.7–76)
NITRITE UR QL STRIP: NEGATIVE
PH UR STRIP.AUTO: 7.5 [PH] (ref 4.6–8)
PLATELET # BLD AUTO: 291 10*3/MM3 (ref 140–450)
PMV BLD AUTO: 6.8 FL (ref 6–12)
POSITIVE CONTROL: POSITIVE
POTASSIUM SERPL-SCNC: 4.4 MMOL/L (ref 3.5–5.2)
PROT SERPL-MCNC: 7.3 G/DL (ref 6–8.5)
PROT UR QL STRIP: NEGATIVE
RBC # BLD AUTO: 4.94 10*6/MM3 (ref 3.77–5.28)
RBC # UR: ABNORMAL /HPF
REF LAB TEST METHOD: ABNORMAL
SODIUM SERPL-SCNC: 139 MMOL/L (ref 136–145)
SP GR UR STRIP: 1.02 (ref 1–1.03)
SQUAMOUS #/AREA URNS HPF: ABNORMAL /HPF
T-UPTAKE NFR SERPL: 0.76 TBI (ref 0.8–1.3)
T4 SERPL-MCNC: 9.11 MCG/DL (ref 4.5–11.7)
TRIGL SERPL-MCNC: 84 MG/DL (ref 0–150)
TSH SERPL DL<=0.05 MIU/L-ACNC: 0.03 UIU/ML (ref 0.27–4.2)
UROBILINOGEN UR QL STRIP: ABNORMAL
VLDLC SERPL-MCNC: 16 MG/DL (ref 5–40)
WBC # BLD AUTO: 8.4 10*3/MM3 (ref 3.4–10.8)
WBC UR QL AUTO: ABNORMAL /HPF

## 2021-03-04 PROCEDURE — 80061 LIPID PANEL: CPT | Performed by: FAMILY MEDICINE

## 2021-03-04 PROCEDURE — 85025 COMPLETE CBC W/AUTO DIFF WBC: CPT | Performed by: FAMILY MEDICINE

## 2021-03-04 PROCEDURE — 81001 URINALYSIS AUTO W/SCOPE: CPT | Performed by: FAMILY MEDICINE

## 2021-03-04 PROCEDURE — 84443 ASSAY THYROID STIM HORMONE: CPT | Performed by: FAMILY MEDICINE

## 2021-03-04 PROCEDURE — 84479 ASSAY OF THYROID (T3 OR T4): CPT | Performed by: FAMILY MEDICINE

## 2021-03-04 PROCEDURE — 82270 OCCULT BLOOD FECES: CPT | Performed by: FAMILY MEDICINE

## 2021-03-04 PROCEDURE — 36415 COLL VENOUS BLD VENIPUNCTURE: CPT | Performed by: FAMILY MEDICINE

## 2021-03-04 PROCEDURE — 99396 PREV VISIT EST AGE 40-64: CPT | Performed by: FAMILY MEDICINE

## 2021-03-04 PROCEDURE — 82306 VITAMIN D 25 HYDROXY: CPT | Performed by: FAMILY MEDICINE

## 2021-03-04 PROCEDURE — 80053 COMPREHEN METABOLIC PANEL: CPT | Performed by: FAMILY MEDICINE

## 2021-03-04 PROCEDURE — 84436 ASSAY OF TOTAL THYROXINE: CPT | Performed by: FAMILY MEDICINE

## 2021-03-04 RX ORDER — PANTOPRAZOLE SODIUM 40 MG/1
40 TABLET, DELAYED RELEASE ORAL DAILY
COMMUNITY
Start: 2021-01-13

## 2021-03-04 RX ORDER — LEVOTHYROXINE SODIUM 0.15 MG/1
150 TABLET ORAL DAILY
Qty: 30 TABLET | Refills: 1 | Status: SHIPPED | OUTPATIENT
Start: 2021-03-04

## 2021-03-04 NOTE — PROGRESS NOTES
"SUBJECTIVE:  The patient is a 35-year-old female.  She is here for gynecological/wellness exam.  She has cardiomyopathy and a pacemaker.  She has a history of Hashimoto's thyroiditis.  She has a history of thyroid cancer.  She has rheumatoid arthritis.  She sees multiple specialists.    PAST MEDICAL HISTORY:  Reviewed.    REVIEW OF SYSTEMS:  Please see above.  All others reviewed and are negative.      OBJECTIVE:   /70 (BP Location: Left arm, Patient Position: Sitting)   Pulse 84   Temp 97.3 °F (36.3 °C) (Infrared)   Resp 20   Ht 165.1 cm (65\")   Wt 92.5 kg (204 lb)   SpO2 97%   BMI 33.95 kg/m²    Vitals signs are reviewed and are stable.    General:  Well-nourished.  Alert and oriented x3 in no acute distress.  HEENT: PERRLA.   Neck:  Supple.   Lungs:  Clear.    Heart:  Regular rate and rhythm.   Breasts: Pendulous, no abnormal masses are detected.  No nipple discharge.  No dimpling.  Axilla free of masses.  Abdomen:   Soft, nontender.   Pelvic: External genitalia normal.  Adnexal masses or tenderness.  Uterus not enlarged.  Rectal: No masses.  Hemoccult negative.  Extremities:  No cyanosis, clubbing or edema.   Neurological:  Grossly intact without motor or sensory deficits.     ASSESSMENT:      Diagnoses and all orders for this visit:    1. Wellness examination (Primary)  -     CBC & Differential  -     Comprehensive Metabolic Panel  -     Lipid Panel  -     Thyroid Panel With TSH  -     Urinalysis With Microscopic - Urine, Clean Catch  -     Vitamin D 25 Hydroxy  -     POCT occult blood x 1 stool  -     Pap IG (Image Guided)  -     Mammo Screening Bilateral With CAD; Future    Other orders  -     Cancel: Mammo Screening Bilateral With CAD; Future         PLAN: See above.  Healthy lifestyle discussed.  Follow-up on labs.    Dictated utilizing Dragon dictation.    "

## 2021-03-08 LAB
CYTOLOGIST CVX/VAG CYTO: NORMAL
CYTOLOGY CVX/VAG DOC CYTO: NORMAL
CYTOLOGY CVX/VAG DOC THIN PREP: NORMAL
DX ICD CODE: NORMAL
HIV 1 & 2 AB SER-IMP: NORMAL
OTHER STN SPEC: NORMAL
STAT OF ADQ CVX/VAG CYTO-IMP: NORMAL

## 2021-03-12 ENCOUNTER — TELEPHONE (OUTPATIENT)
Dept: CARDIOLOGY | Facility: CLINIC | Age: 56
End: 2021-03-12

## 2021-03-12 NOTE — TELEPHONE ENCOUNTER
RAHAT Murillo from [facility] called and stated they still have not received the surgery clearance for the patient. I have already faxed this once and it successfully went through. I faxed this again to a different fax number and it also successfully went through. This fax and confirmation will also be scanned into pt's chart.

## 2021-03-26 ENCOUNTER — BULK ORDERING (OUTPATIENT)
Dept: CASE MANAGEMENT | Facility: OTHER | Age: 56
End: 2021-03-26

## 2021-03-26 DIAGNOSIS — Z23 IMMUNIZATION DUE: ICD-10-CM

## 2021-04-06 ENCOUNTER — TELEPHONE (OUTPATIENT)
Dept: FAMILY MEDICINE CLINIC | Facility: CLINIC | Age: 56
End: 2021-04-06

## 2021-04-06 NOTE — TELEPHONE ENCOUNTER
CALLIE WITH [facility] [employer] CALLED STATING THAT INSURANCE IS REQUEST AUTHORIZATION FROM THE PATIENT'S PCP DR. LEONARD BLANCHARD FOR THE PATIENT TO DO PHYSICAL THERAPAY AFTER A SHOULDER SURGERY ON 03/16/21.    CALL BACK:  [phone]

## 2021-05-03 ENCOUNTER — TELEPHONE (OUTPATIENT)
Dept: CARDIOLOGY | Facility: CLINIC | Age: 56
End: 2021-05-03

## 2021-05-03 RX ORDER — AMOXICILLIN 500 MG/1
1000 CAPSULE ORAL TAKE AS DIRECTED
COMMUNITY
End: 2021-05-03 | Stop reason: SDUPTHER

## 2021-05-03 RX ORDER — AMOXICILLIN 500 MG/1
CAPSULE ORAL
Qty: 4 CAPSULE | Refills: 3 | Status: SHIPPED | OUTPATIENT
Start: 2021-05-03 | End: 2022-09-08

## 2021-05-03 NOTE — TELEPHONE ENCOUNTER
05/03/21   Pt called she is going to be having dental work again and needs antibiotic sent in with a few refills because she many have several upcoming dental work.   She would like it called in to Apryl at [facility].   Thanks Elmo GILL

## 2021-06-29 ENCOUNTER — OFFICE VISIT (OUTPATIENT)
Dept: CARDIOLOGY | Facility: CLINIC | Age: 56
End: 2021-06-29

## 2021-06-29 VITALS
HEART RATE: 76 BPM | WEIGHT: 204 LBS | HEIGHT: 65 IN | DIASTOLIC BLOOD PRESSURE: 82 MMHG | OXYGEN SATURATION: 98 % | SYSTOLIC BLOOD PRESSURE: 134 MMHG | BODY MASS INDEX: 33.99 KG/M2

## 2021-06-29 DIAGNOSIS — Z95.0 PRESENCE OF CARDIAC PACEMAKER: ICD-10-CM

## 2021-06-29 DIAGNOSIS — Z87.74 STATUS POST PATCH CLOSURE OF ASD: Primary | ICD-10-CM

## 2021-06-29 DIAGNOSIS — I42.8 NICM (NONISCHEMIC CARDIOMYOPATHY) (HCC): ICD-10-CM

## 2021-06-29 DIAGNOSIS — Z98.890 STATUS POST TRICUSPID VALVE REPAIR: ICD-10-CM

## 2021-06-29 DIAGNOSIS — I50.42 CHRONIC COMBINED SYSTOLIC AND DIASTOLIC CONGESTIVE HEART FAILURE (HCC): ICD-10-CM

## 2021-06-29 PROCEDURE — 99213 OFFICE O/P EST LOW 20 MIN: CPT | Performed by: INTERNAL MEDICINE

## 2021-06-30 RX ORDER — BUMETANIDE 0.5 MG/1
1 TABLET ORAL DAILY PRN
Qty: 90 TABLET | Refills: 1 | Status: SHIPPED | OUTPATIENT
Start: 2021-06-30

## 2021-07-09 ENCOUNTER — TELEPHONE (OUTPATIENT)
Dept: FAMILY MEDICINE CLINIC | Facility: CLINIC | Age: 56
End: 2021-07-09

## 2021-07-09 NOTE — TELEPHONE ENCOUNTER
PATIENT SEES DR. JAYLA DAVIS WHO DR. BLANCHARD REFERRED HER TOO, HOWEVER HER REFERRAL HAS  AND IN ORDER TO CONTINUE SEEING HIM SHE NEEDS A NEW REFERRAL SENT OVER. SHE HAS AN APT ON  NEEDS IT BY THEN PLEASE.       PATIENT CAN BE REACHED AT: [phone]

## 2021-07-12 DIAGNOSIS — E06.3 HASHIMOTO'S THYROIDITIS: Primary | ICD-10-CM

## 2021-07-21 PROCEDURE — 93296 REM INTERROG EVL PM/IDS: CPT | Performed by: INTERNAL MEDICINE

## 2021-07-21 PROCEDURE — 93294 REM INTERROG EVL PM/LDLS PM: CPT | Performed by: INTERNAL MEDICINE

## 2021-10-12 ENCOUNTER — TELEPHONE (OUTPATIENT)
Dept: FAMILY MEDICINE CLINIC | Facility: CLINIC | Age: 56
End: 2021-10-12

## 2021-10-12 DIAGNOSIS — E03.9 HYPOTHYROIDISM, UNSPECIFIED TYPE: ICD-10-CM

## 2021-10-12 DIAGNOSIS — E06.3 HASHIMOTO'S THYROIDITIS: Primary | ICD-10-CM

## 2021-10-12 DIAGNOSIS — I01.1 RHEUMATOID AORTITIS: ICD-10-CM

## 2021-10-12 NOTE — TELEPHONE ENCOUNTER
Caller: Whitney Schofield    Relationship: Self    Best call back number: [phone]    PATIENT CALLED STATING THAT SHE HAS APPT WITH RHEUMATOLOGIST TOMORROW AND THEY HAVE ADVISED HER THAT SHE NEEDS A NEW REFERRAL FOR THIS APPT AS HER OLD ONE HAS . PATIENT IS REQUESTING NEW REFERRAL TO BE SENT ASAP     DR ZEHRA HERNANDEZ MD  [address] [place] [address]  PH: [phone]  F: [phone]        PATIENT IS ALSO REQUESTING A NEW REFERRAL FOR HER ORTHOPEDIC SURGEON  DR EMILY CASPER MD  [address] [place] [address]  PH: [phone]  F: [phone]

## 2021-10-12 NOTE — TELEPHONE ENCOUNTER
Referrals pended in this encounter  Rheumatologist dx- Rheumatoid Arthritis  Ortho dx- left shoulder pain

## 2021-10-22 ENCOUNTER — TELEPHONE (OUTPATIENT)
Dept: CARDIOLOGY | Facility: CLINIC | Age: 56
End: 2021-10-22

## 2021-10-22 RX ORDER — METOPROLOL SUCCINATE 25 MG/1
12.5 TABLET, EXTENDED RELEASE ORAL DAILY
Qty: 45 TABLET | Refills: 3 | Status: SHIPPED | OUTPATIENT
Start: 2021-10-22

## 2021-10-22 NOTE — TELEPHONE ENCOUNTER
----- Message from Whitney Schofield sent at 10/21/2021  8:44 PM EDT -----  Regarding: RE: Prescription Question  Contact: [phone]  Dr Marques had me on the first 2 as needed.  I just had the water pill filled.  Talk to Kimi.  She knows my history.   I ran out and threw away the bottles.  I had a bad episode the other night.  I usually just work through them but this one lasted about an hour and a half.   Work me up in the middle of the night.  Mild chest pain, tightness in between my shoulders, heart skipping beats and shortness of breath    Took blood pressure and it wasn’t too bad 150/98.   But that is as out of a dead sleep.  Lol.  Anyway I realized I didn’t have any medication at that time.  If you could get it in soon I would appreciate it.

## 2021-10-22 NOTE — TELEPHONE ENCOUNTER
Pt called and clarified that it is just the metoprolol and the other meds are PRN. I sent in the metoprolol for her. // HG

## 2021-10-22 NOTE — TELEPHONE ENCOUNTER
I have called and LVM x 3 for pt. She called back and VLm however when I called her back I did not get an answer. I asked her to have me paged next time she calls.     I have also called pt's pharmacy to get the meds that are regularly filled, however they stated she has not filled anything since March and none of them were cardiac meds. She did do a central search and was able to tell me that none of them are filled regularly at any [facility]. I will await pt's call to try and review meds. // HG

## 2021-11-01 NOTE — TELEPHONE ENCOUNTER
----- Message from Evelyne Salmon sent at 11/1/2021  3:47 PM EDT -----  Subject: Appointment Request    Reason for Call: Urgent Mental Health    QUESTIONS  Type of Appointment? Established Patient  Reason for appointment request? Requested Provider unavailable - Leno Yanez cnp  Additional Information for Provider? Patient would like to see pcp for   anxiety. Patient did screen green. Appointment returned urgent and office   was closed . Service expert offered other provider options and to connect   patient to a/h line. However patient does not feel like this needs an   urgent appointment and refused a/h.   ---------------------------------------------------------------------------  --------------  [address] [employer]  What is the best way for the office to contact you? Do not leave any   message, patient will call back for answer  Preferred Call Back Phone Number? [phone]  ---------------------------------------------------------------------------  --------------  SCRIPT ANSWERS  Relationship to Patient? Self  Are you having thoughts of hurting yourself or others? No  Are you seeing or hearing things that may not be real (present)? No  Do you think other people are trying to hurt or target you? No  Are you feeling sick because you have not used drugs or alcohol recently? No  Are you feeling sick because of using drugs or alcohol recently? No  Are you having depressed feelings? Yes  Have you been diagnosed with, awaiting test results for, or told that you   are suspected of having COVID-19 (Coronavirus)? (If patient has tested   negative or was tested as a requirement for work, school, or travel and   not based on symptoms, answer no)? No  Within the past two weeks have you developed any of the following symptoms   (answer no if symptoms have been present longer than 2 weeks or began   more than 2 weeks ago)?  Fever or Chills, Cough, Shortness of breath or   difficulty breathing, Loss of taste or smell, Sore NEEDS TO TALK ABOUT GETTING REFERRED TO A PSYCH.    throat, Nasal   congestion, Sneezing or runny nose, Fatigue or generalized body aches   (answer no if pain is specific to a body part e.g. back pain), Diarrhea,   Headache? No  Have you had close contact with someone with COVID-19 in the last 14 days? No  (Service Expert  click yes below to proceed with [facility] As Usual   Scheduling)?  Yes

## 2022-01-05 RX ORDER — LEVOFLOXACIN 500 MG/1
500 TABLET, FILM COATED ORAL DAILY
Qty: 7 TABLET | Refills: 0 | Status: SHIPPED | OUTPATIENT
Start: 2022-01-05 | End: 2022-01-05

## 2022-01-06 ENCOUNTER — TELEPHONE (OUTPATIENT)
Dept: FAMILY MEDICINE CLINIC | Facility: CLINIC | Age: 57
End: 2022-01-06

## 2022-01-06 NOTE — TELEPHONE ENCOUNTER
DULCE CALLING BECAUSE RX CEFTIN SUSPENSION IS OFF THE MARKET AND WANTING TO KNOW IF IT CAN BE CHANGED

## 2022-01-06 NOTE — TELEPHONE ENCOUNTER
Spoke with jossue at pharmacy, cefprozil is available. She will change it to that, same dose ,directions and qty

## 2022-01-11 ENCOUNTER — TELEPHONE (OUTPATIENT)
Dept: CARDIOLOGY | Facility: CLINIC | Age: 57
End: 2022-01-11

## 2022-01-11 NOTE — TELEPHONE ENCOUNTER
Pt with Medtronic DDD CRT-P and routine remote transmission received showing that RV impedance has been trending upward over the last 6 months. RV impedence resulted 1995 ohms (bipolar) on 1/7/22. Pt is programmed bipolar. RV thresholds have remained stable and consistent with trends. Attached are RV lead trends.

## 2022-01-18 ENCOUNTER — TELEPHONE (OUTPATIENT)
Dept: FAMILY MEDICINE CLINIC | Facility: CLINIC | Age: 57
End: 2022-01-18

## 2022-01-18 NOTE — TELEPHONE ENCOUNTER
PT IS REQUESTING A CALL BACK TO SEE OF SHE CAN GET A NEW REFERRAL TO [facility] WITH DR IVERSON. STATES THE CURRENT ONE IS .

## 2022-01-19 DIAGNOSIS — I42.9 CARDIOMYOPATHY, UNSPECIFIED TYPE: Primary | ICD-10-CM

## 2022-01-19 PROCEDURE — 93296 REM INTERROG EVL PM/IDS: CPT | Performed by: INTERNAL MEDICINE

## 2022-01-19 PROCEDURE — 93294 REM INTERROG EVL PM/LDLS PM: CPT | Performed by: INTERNAL MEDICINE

## 2022-01-25 DIAGNOSIS — R60.0 UNILATERAL EDEMA OF LOWER EXTREMITY: Primary | ICD-10-CM

## 2022-01-26 ENCOUNTER — HOSPITAL ENCOUNTER (OUTPATIENT)
Dept: CARDIOLOGY | Facility: HOSPITAL | Age: 57
Discharge: HOME | End: 2022-01-26
Admitting: INTERNAL MEDICINE

## 2022-01-26 DIAGNOSIS — R60.0 UNILATERAL EDEMA OF LOWER EXTREMITY: ICD-10-CM

## 2022-01-26 LAB

## 2022-01-26 PROCEDURE — 93970 EXTREMITY STUDY: CPT

## 2022-01-27 NOTE — PROGRESS NOTES
Called and gave her the results. Suspect she may have had a ruptured Baker's cyst to account for the fluid in her left popliteal fossa. She is going to elevate her leg at night, and she is going to take Bumex for the next 2 days. No evidence of DVT.    GM

## 2022-02-16 ENCOUNTER — CLINICAL SUPPORT NO REQUIREMENTS (OUTPATIENT)
Dept: CARDIOLOGY | Facility: CLINIC | Age: 57
End: 2022-02-16

## 2022-02-16 ENCOUNTER — TELEPHONE (OUTPATIENT)
Dept: CARDIOLOGY | Facility: CLINIC | Age: 57
End: 2022-02-16

## 2022-02-16 ENCOUNTER — OFFICE VISIT (OUTPATIENT)
Dept: CARDIOLOGY | Facility: CLINIC | Age: 57
End: 2022-02-16

## 2022-02-16 VITALS
HEART RATE: 84 BPM | BODY MASS INDEX: 33.95 KG/M2 | OXYGEN SATURATION: 98 % | HEIGHT: 65 IN | DIASTOLIC BLOOD PRESSURE: 72 MMHG | SYSTOLIC BLOOD PRESSURE: 116 MMHG

## 2022-02-16 DIAGNOSIS — I50.42 CHRONIC COMBINED SYSTOLIC AND DIASTOLIC CONGESTIVE HEART FAILURE: ICD-10-CM

## 2022-02-16 DIAGNOSIS — Z95.0 PRESENCE OF CARDIAC PACEMAKER: ICD-10-CM

## 2022-02-16 DIAGNOSIS — Z87.74 STATUS POST PATCH CLOSURE OF ASD: Primary | ICD-10-CM

## 2022-02-16 DIAGNOSIS — I42.8 NICM (NONISCHEMIC CARDIOMYOPATHY): ICD-10-CM

## 2022-02-16 DIAGNOSIS — I42.8 NICM (NONISCHEMIC CARDIOMYOPATHY): Primary | ICD-10-CM

## 2022-02-16 DIAGNOSIS — Z98.890 STATUS POST TRICUSPID VALVE REPAIR: ICD-10-CM

## 2022-02-16 PROCEDURE — 93281 PM DEVICE PROGR EVAL MULTI: CPT | Performed by: INTERNAL MEDICINE

## 2022-02-16 PROCEDURE — 99213 OFFICE O/P EST LOW 20 MIN: CPT | Performed by: INTERNAL MEDICINE

## 2022-02-27 NOTE — PROGRESS NOTES
Date of Office Visit:  2022  Encounter Provider: Lion Marques MD  Place of Service: [facility] CARDIOLOGY  Patient Name: Whitney Schofield  :1965    Chief complaint: Follow-up for open ASD closure, nonischemic cardiomyopathy,   chronic combined CHF, tricuspid valve repair, and pacemaker placement.    History of Present Illness:    I again had the pleasure of seeing your patient in cardiology office on .  As  you well know, she is a very pleasant 56 year old white female with a past history  significant for multiple cardiovascular issues who presents for follow-up. The patient   originally presented to [facility] with symptoms of palpitations and  shortness of breath in 2009. She subsequently was diagnosed with Hashimoto  thyroiditis, and underwent a partial thyroidectomy in 2010. As part of a routine  follow-up, she had a two-dimensional echocardiogram performed on 3/1/2010 which   revealed a severely dilated right ventricle and significant pulmonary hypertension. A CT   scan of her chest did not show any evidence for pulmonary embolus or other acute lung   pathology.  She underwent a transesophageal echocardiogram on 3/16/2010 which   revealed a large secundum ASD high in the atrial septum. 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 She was referred to   Dr. Harper for evaluation to surgically repair the ASD, and underwent a preoperative   cardiac catheterization on 3/26/2010. This revealed angiographically normal coronary   arteries, with a Qp:Qs of 2.3, consistent with a large shunt. The patient underwent   closure of the ASD, as well as tricuspid valve repair with an annuloplasty ring by Dr. Harper at [facility] on 2010. Her postoperative period was complicated by   complete AV block and accelerated junctional escape rhythm. She underwent   placement of a permanent  "pacemaker on 5/11/2010.      The patient continued to have significant shortness of breath and a 24-hour Holter   monitor and two-dimensional echocardiogram were obtained on 5/12/2011. Her   Holter monitor did show several brief episodes of tachycardia. The echocardiogram   did not show any significant findings other than a \"shuttering motion\" of the septum.   She underwent a right and left heart catheterization on 7/20/2011 to assess for any   evidence of constriction. There was no evidence of restriction or constriction on the   heart catheterization, although she did have pulmonary hypertension with a mean   pulmonary artery pressure of 30 mmHg. A transesophageal echocardiogram was also   performed on 6/20/2011 which revealed an ejection fraction of 55-60%, and an intact   ASD repair. An electrophysiology study was performed by Dr. Foster on 8/1/2011.   This was performed through her pacemaker in conjunction with an isoproterenol   infusion. This showed no inducible tachycardia or ventricular tachycardia.       The patient eventually wore a 21-day event recorder and was noted to go into   wide-complex tachycardia on 9/30/2012. She had multiple other episodes as well.   Dr. Aguilar from Electrophysiology saw the patient and felt this represented ventricular   tachycardia. She did ultimately undergo a repeat electrophysiology study on   11/20/2012 by Dr. Aguilar which confirmed complete infra-hisian block with no   conduction retrograde or antegrade. No ventricular tachycardia was induced. She   was started on beta blockers at that time.       At the patient's visit in 3/2013 she complained of severe volume retention and edema.    She was also more short of breath and had gained a significant amount of weight.   She was admitted to the hospital on 3/25/2013 and underwent a transesophageal   echocardiogram.  Her ejection fraction had decreased significantly to 35%, and there   was an extensive amount of thrombus formation " on her pacemaker wires. Her ASD   repair appeared to be in good condition. She did undergo a CT scan of the chest   which showed no evidence for pulmonary embolism, as well as a CT scan of the   abdomen which showed no evidence for inferior vena cava obstruction or clotting.   She was diuresed extensively and placed on Coumadin at that time. 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 She ultimately underwent left   ventricular lead placement and pacemaker replacement on 8/19/2013 by Dr. Aguilar.   She then had right atrial lead dislodgement and diaphragmatic stimulation. A right   atrial lead revision was performed on 2/19/2014; however, the patient developed a   significant left subclavian vein thrombosis post procedurally. She again was placed   on Coumadin at that time, which was then switched to Xarelto. She ultimately   underwent laser lead extraction of the abandoned right atrial lead on 4/17/2014. She   did undergo a right heart cath on 4/13/2015 which showed only mild pulmonary   hypertension (mean PA pressure 29 mm Hg).      The patient presents today for follow-up.  She recently had swelling in her left lower   extremity, although a Doppler ultrasound showed a fluid collection, but no DVT.  Her   edema is better, but is still present in smaller amounts.  She did have COVID-19 in   January 2022, although it evidently was not a severe case.  Her rheumatoid arthritis   has been severe recently.  She still gets occasional shortness of breath with   exertion, although she is limited in her mobility because of the rheumatoid arthritis.    She has not had any chest pain.      Past Medical History:   Diagnosis Date   • Anemia    • ASD (atrial septal defect)    • Asthma    • Asystole (HCC)     Post ASD closure asystole and high-grade AV block.  s/p pacemaker 5/11/10.   • Celiac disease    • DVT (deep venous thrombosis) (HCC)     Left subclavian DVT following right atrial pacemaker lead  revision in 2/14   • Edema    • Hashimoto's thyroiditis     s/p partial thyroidectomy 1/13/10   • Heart palpitations    • Hx of thyroid cancer    • Malignant neoplasm of thyroid gland (HCC)     Papillary carcinoma of the thyroid - s/p residual thyroidectomy in 2/11.   • Migraine    • Nonischemic cardiomyopathy (HCC)     EF as low as 35% 3/13.  EF 6/21/13 by CHANDANA was 50-55%, and 53% by echo on 6/17/14.  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  EF 50% by CHANDANA on 4/18/16.   • NSVT (nonsustained ventricular tachycardia) (HCC)     Noted by event recorder 9/12.  EP study by Dr. Aguilar on 11/20/12 showed no inducible VT.   • LUKAS on CPAP    • Papillary thyroid carcinoma (HCC) 08/27/2013   • Rheumatoid arthritis (HCC)     Severe and debilitating    • Secundum ASD     s/p open closure with a PeriPatch xenograft by Dr. Harper on 5/6/10 by Dr. [facility] at [facility].   • Sleep apnea    • SOB (shortness of breath)     Multifactorial    • Thrombus due to any device, implant or graft     Extensive thrombus formation on the pacemaker leads by CHANDANA 3/25/13.  Initiated on Coumadin transiently at that time - improved significantly by CHANDANA 6/21/13.       Past Surgical History:   Procedure Laterality Date   • ASD AND VSD REPAIR     • CARDIAC ELECTROPHYSIOLOGY PROCEDURE N/A 1/18/2021    Procedure: Generator Change BI Ventriculat PPM- Medtronic;  Surgeon: Jose Mcdowell MD;  Location: [facility] CATH INVASIVE LOCATION;  Service: Cardiology;  Laterality: N/A;   • CHOLECYSTECTOMY  2007   • ENDOSCOPY AND COLONOSCOPY  12/30/2015    Tom Orta MD   • EXCISION MASS TRUNK Right 7/20/2018    Procedure: EXCISION MASS TRUNK, RUQ ABD WALL LIPOMA;  Surgeon: Eduar Marx MD;  [place] [facility] [place] [facility] [place]  Service: General   • PACEMAKER IMPLANTATION  05/2010    Inital dual-chamber pacemaker placed 5/11/10 after ASD closure surgery.  Had LV lead placement and generator change on 8/19/13 by Dr. Aguilar (Medtronic #[id number]).  Then had right  atrial lead revision on 2/19/14 (for right atrial lead dislodgement and diaphragmatic stimulation) - developed left subclavian DVT afterwards.  Then had laser lead extraction of the abandoned right atrial lead on 4/17/14.   • REPLACEMENT TOTAL KNEE BILATERAL     • SHOULDER SURGERY Right    • THYROID LOBECTOMY Right 2012   • THYROIDECTOMY Left 2011    Dr. Coronado, radioactive iodine X3   • TONSILLECTOMY     • TRICUSPID VALVE SURGERY      #32 MC3 annuloplasty ring by Dr. Harper on 5/6/10 (at time of ASD closure)       Current Outpatient Medications on File Prior to Visit   Medication Sig Dispense Refill   • albuterol sulfate  (90 Base) MCG/ACT inhaler Inhale 2 puffs Every 4 (Four) Hours As Needed for Wheezing. 2 puffs every 4 hours when necessary dyspnea 1 inhaler 1   • amoxicillin (AMOXIL) 500 MG capsule TAKE 4 TABLETS BY MOUTH 1 HOUR BEFORE DENTAL PROCEDURE 4 capsule 3   • bumetanide (BUMEX) 0.5 MG tablet Take 2 tablets by mouth Daily As Needed (swelling). 90 tablet 1   • cefuroxime (Ceftin) 250 MG/5ML suspension Take 5 mL by mouth 2 (Two) Times a Day. 20 each 0   • cloNIDine (CATAPRES) 0.2 MG tablet Take 1 tablet by mouth 2 (Two) Times a Day. Take blood twice a day as needed for blood pressure over 180/90. 14 tablet 0   • Ferrous Sulfate Dried 200 (65 FE) MG tablet Take 200 mg by mouth Daily.     • fluticasone-salmeterol (ADVAIR HFA) 115-21 MCG/ACT inhaler Inhale 2 puffs 2 (Two) Times a Day. 1 inhaler 0   • folic acid (FOLVITE) 1 MG tablet Take 1 mg by mouth daily.     • HYDROcodone-acetaminophen (NORCO)  MG per tablet Take 1 tablet by mouth Every 6 (Six) Hours As Needed.     • ipratropium-albuterol (DUO-NEB) 0.5-2.5 mg/3 ml nebulizer Take 3 mL by nebulization Every 4 (Four) Hours As Needed for Wheezing or Shortness of Air. 360 mL 0   • levothyroxine (Synthroid) 150 MCG tablet Take 1 tablet by mouth Daily. 30 tablet 1   • losartan (COZAAR) 25 MG tablet Take 25 mg by mouth Daily As Needed.     •  "methotrexate 2.5 MG tablet Take 20 mg by mouth.     • methylPREDNISolone (MEDROL) 4 MG dose pack Take as directed on package instructions. 21 tablet 0   • metoprolol succinate XL (TOPROL-XL) 25 MG 24 hr tablet Take 0.5 tablets by mouth Daily. 45 tablet 3   • omeprazole (priLOSEC) 40 MG capsule Take 40 mg by mouth Daily.     • pantoprazole (PROTONIX) 40 MG EC tablet Take 40 mg by mouth Daily.     • vitamin D (ERGOCALCIFEROL) 80912 units capsule capsule Take 50,000 Units by mouth 3 (Three) Times a Week.       No current facility-administered medications on file prior to visit.     Allergies as of 02/16/2022 - Reviewed 02/16/2022   Allergen Reaction Noted   • Ferumoxytol  02/10/2017   • Azithromycin Rash 06/02/2012     Social History     Socioeconomic History   • Marital status:    • Number of children: 2   Tobacco Use   • Smoking status: Never Smoker   • Smokeless tobacco: Never Used   Substance and Sexual Activity   • Alcohol use: No   • Drug use: No   • Sexual activity: Yes     Partners: Male     Family History   Problem Relation Age of Onset   • COPD Mother    • Heart failure Mother         CHF   • Lung disease Mother    • Hypertension Mother    • Diabetes Mother    • Coronary artery disease Mother    • Diabetes Sister    • Breast cancer Maternal Aunt    • Malig Hyperthermia Neg Hx        Review of Systems   Constitutional: Positive for malaise/fatigue.   Cardiovascular: Positive for leg swelling.   Respiratory: Positive for shortness of breath.    Hematologic/Lymphatic: Bruises/bleeds easily.   Musculoskeletal: Positive for joint pain and joint swelling.   Gastrointestinal: Positive for diarrhea and nausea.   Neurological: Positive for excessive daytime sleepiness.   Psychiatric/Behavioral: The patient is nervous/anxious.    All other systems reviewed and are negative.     Objective:     Vitals:    02/16/22 1425   BP: 116/72   Pulse: 84   SpO2: 98%   Height: 165.1 cm (65\")     Body mass index is 33.95 " kg/m².    Physical Exam  Constitutional:       Appearance: She is well-developed.   HENT:      Head: Normocephalic and atraumatic.   Eyes:      Conjunctiva/sclera: Conjunctivae normal.   Cardiovascular:      Rate and Rhythm: Normal rate and regular rhythm.      Heart sounds: Murmur heard.    Systolic murmur is present with a grade of 2/6 at the upper left sternal border and lower left sternal border.  No friction rub.   Pulmonary:      Effort: Pulmonary effort is normal.      Breath sounds: Normal breath sounds. No rales.   Abdominal:      Palpations: Abdomen is soft.      Tenderness: There is no abdominal tenderness.   Musculoskeletal:      Cervical back: Neck supple.      Comments: Trace edema of the lower extremities bilaterally    Skin:     General: Skin is warm.   Neurological:      Mental Status: She is alert and oriented to person, place, and time.   Psychiatric:         Behavior: Behavior normal.       Lab Review:   Procedures    Cardiac Procedures:  1. Status post closure of a large secundum ASD with a PeriPatch xenograft with concomitant  tricuspid valve repair with a #32 MC3 annuloplasty ring by Dr. Jaime Harper on 5/6/2010   at [facility].    2. Left heart catheterization on 3/26/2010 showed angiographically normal coronary  arteries.    3. Transesophageal echocardiogram on 6/21/2013 revealed an ejection fraction of 50-55%.    4. Status post placement of a dual chamber permanent pacemaker by Dr. Bush on   5/11/2010 following postoperative asystole and complete AV block after her ASD surgery.    5. Status post left ventricular lead placement and pacemaker replacement on 08/19/2013 by  Dr. Aguilar. At that time she had a left ventricular lead added, to make this a  biventricular pacemaker. The generator was also changed and is a Medtronic #[id number].    6. Status post right atrial lead revision on 2/19/2014. This was secondary to right atrial   lead dislodgement and diaphragmatic stimulation. She did  develop a left subclavian vein   thrombosis following the procedure.    7. Status post laser lead extraction of the abandoned right atrial lead on 4/17/2014 by Dr. Christiano Aguilar at [facility].    8. CHANDANA on 4/18/2016: Ejection fraction was 50%, the left ventricle was mildly dilated, there   was grade 1 diastolic dysfunction, the ASD closure site was intact with no evidence for   shunting, there was no evidence of vegetation or thrombus formation on the pacemaker   leads, there was mild mitral regurgitation, and there was trace to mild tricuspid regurgitation   through the annuloplasty ring.  10.  Echocardiogram on 10/10/2018: There was akinesis of the basal to mid inferolateral   wall.  Ejection fraction was 52%.  There was grade 1 diastolic dysfunction.  There was   trace tricuspid regurgitation through the annuloplasty ring.  11.  Lexiscan Myoview stress test on 10/10/2018: Normal with no ischemia.  12.  Echocardiogram on 2/12/2020: The ejection fraction was 54%.  The tricuspid valve   repair was normal.  There was trace tricuspid regurgitation.  13.  Status post Medtronic pacemaker generator change on 1/18/2021 by Dr. Mcdowell.    Assessment:       Diagnosis Plan   1. Status post patch closure of ASD  Adult Transthoracic Echo Complete w/ Color, Spectral and Contrast if Necessary Per Protocol   2. NICM (nonischemic cardiomyopathy) (HCC)  Adult Transthoracic Echo Complete w/ Color, Spectral and Contrast if Necessary Per Protocol   3. Chronic combined systolic and diastolic congestive heart failure (HCC)  Adult Transthoracic Echo Complete w/ Color, Spectral and Contrast if Necessary Per Protocol   4. Status post tricuspid valve repair  Adult Transthoracic Echo Complete w/ Color, Spectral and Contrast if Necessary Per Protocol   5. Presence of cardiac pacemaker       Plan:       Her lower extremity edema is better, but is still present more than normal.  She had an ultrasound of her lower extremities which were  negative for DVT, although she had a collection of fluid in her left calf area.  This very likely represents a ruptured Baker's cyst.  Her main issue continues to be the rheumatoid arthritis.  She is severely limited in her mobility.  She actually told me that she is considering finally applying for disability because it has been very difficult for work.    She will continue on the Toprol-XL and losartan.  Her blood pressure has been good at home.  She only takes Bumex 1 mg as needed when she has edema.  She takes these several times a month on average.  She does not have any significant edema today.  She is due for another echocardiogram at some point to check her tricuspid valve repair.  I would like to reevaluate her ejection fraction as well.  She has not had any issues with the ASD closure since it was performed years ago.  Her pacemaker was successfully changed on 1/18/2021.  She is pacemaker dependent from third-degree AV block resulting from her cardiac surgery.  The pacemaker interrogation from earlier today shows 8.9 years of battery life, and no significant issues.  I will see her back in the office in 6 months.

## 2022-04-28 ENCOUNTER — TELEPHONE (OUTPATIENT)
Dept: FAMILY MEDICINE CLINIC | Facility: CLINIC | Age: 57
End: 2022-04-28

## 2022-05-25 DIAGNOSIS — M25.559 HIP PAIN: Primary | ICD-10-CM

## 2022-06-20 ENCOUNTER — OFFICE VISIT (OUTPATIENT)
Dept: ORTHOPEDIC SURGERY | Facility: CLINIC | Age: 57
End: 2022-06-20

## 2022-06-20 ENCOUNTER — HOSPITAL ENCOUNTER (OUTPATIENT)
Dept: CARDIOLOGY | Facility: HOSPITAL | Age: 57
Discharge: HOME | End: 2022-06-20
Admitting: INTERNAL MEDICINE

## 2022-06-20 VITALS — WEIGHT: 198 LBS | HEIGHT: 65 IN | BODY MASS INDEX: 32.99 KG/M2

## 2022-06-20 VITALS
BODY MASS INDEX: 33.99 KG/M2 | DIASTOLIC BLOOD PRESSURE: 60 MMHG | SYSTOLIC BLOOD PRESSURE: 132 MMHG | HEART RATE: 74 BPM | HEIGHT: 65 IN | WEIGHT: 204 LBS

## 2022-06-20 DIAGNOSIS — M70.62 GREATER TROCHANTERIC BURSITIS OF BOTH HIPS: Primary | ICD-10-CM

## 2022-06-20 DIAGNOSIS — I42.8 NICM (NONISCHEMIC CARDIOMYOPATHY): ICD-10-CM

## 2022-06-20 DIAGNOSIS — M70.61 GREATER TROCHANTERIC BURSITIS OF BOTH HIPS: Primary | ICD-10-CM

## 2022-06-20 DIAGNOSIS — I50.42 CHRONIC COMBINED SYSTOLIC AND DIASTOLIC CONGESTIVE HEART FAILURE: ICD-10-CM

## 2022-06-20 DIAGNOSIS — Z87.74 STATUS POST PATCH CLOSURE OF ASD: ICD-10-CM

## 2022-06-20 DIAGNOSIS — M76.899 TENDINITIS INVOLVING HIP ABDUCTORS, UNSPECIFIED LATERALITY: ICD-10-CM

## 2022-06-20 DIAGNOSIS — Z98.890 STATUS POST TRICUSPID VALVE REPAIR: ICD-10-CM

## 2022-06-20 DIAGNOSIS — R52 PAIN: ICD-10-CM

## 2022-06-20 DIAGNOSIS — M70.61 GREATER TROCHANTERIC BURSITIS OF RIGHT HIP: ICD-10-CM

## 2022-06-20 LAB
AORTIC ARCH: 2.7 CM
AORTIC DIMENSIONLESS INDEX: 0.7 (DI)
ASCENDING AORTA: 2.8 CM
BH CV ECHO LEFT VENTRICLE GLOBAL LONGITUDINAL STRAIN: -19.6 %
BH CV ECHO MEAS - ACS: 2.24 CM
BH CV ECHO MEAS - AO MAX PG: 8.1 MMHG
BH CV ECHO MEAS - AO MEAN PG: 4.9 MMHG
BH CV ECHO MEAS - AO ROOT DIAM: 3.6 CM
BH CV ECHO MEAS - AO V2 MAX: 142.5 CM/SEC
BH CV ECHO MEAS - AO V2 VTI: 25 CM
BH CV ECHO MEAS - AVA(I,D): 2.6 CM2
BH CV ECHO MEAS - EDV(CUBED): 105.8 ML
BH CV ECHO MEAS - EDV(MOD-SP2): 117 ML
BH CV ECHO MEAS - EDV(MOD-SP4): 104 ML
BH CV ECHO MEAS - EF(MOD-BP): 60 %
BH CV ECHO MEAS - EF(MOD-SP2): 59.8 %
BH CV ECHO MEAS - EF(MOD-SP4): 61.5 %
BH CV ECHO MEAS - ESV(CUBED): 34.9 ML
BH CV ECHO MEAS - ESV(MOD-SP2): 47 ML
BH CV ECHO MEAS - ESV(MOD-SP4): 40 ML
BH CV ECHO MEAS - FS: 30.9 %
BH CV ECHO MEAS - IVS/LVPW: 0.83 CM
BH CV ECHO MEAS - IVSD: 0.91 CM
BH CV ECHO MEAS - LA 3D VOL INDEX: 31
BH CV ECHO MEAS - LAT PEAK E' VEL: 10.2 CM/SEC
BH CV ECHO MEAS - LV DIASTOLIC VOL/BSA (35-75): 52.1 CM2
BH CV ECHO MEAS - LV MASS(C)D: 167.3 GRAMS
BH CV ECHO MEAS - LV MAX PG: 3.3 MMHG
BH CV ECHO MEAS - LV MEAN PG: 2.14 MMHG
BH CV ECHO MEAS - LV SYSTOLIC VOL/BSA (12-30): 20.1 CM2
BH CV ECHO MEAS - LV V1 MAX: 91.3 CM/SEC
BH CV ECHO MEAS - LV V1 VTI: 18.5 CM
BH CV ECHO MEAS - LVIDD: 4.7 CM
BH CV ECHO MEAS - LVIDS: 3.3 CM
BH CV ECHO MEAS - LVOT AREA: 3.4 CM2
BH CV ECHO MEAS - LVOT DIAM: 2.1 CM
BH CV ECHO MEAS - LVPWD: 1.1 CM
BH CV ECHO MEAS - MED PEAK E' VEL: 6.2 CM/SEC
BH CV ECHO MEAS - MV A DUR: 0.16 SEC
BH CV ECHO MEAS - MV A MAX VEL: 100.7 CM/SEC
BH CV ECHO MEAS - MV DEC SLOPE: 273.8 CM/SEC2
BH CV ECHO MEAS - MV DEC TIME: 0.19 MSEC
BH CV ECHO MEAS - MV E MAX VEL: 61.7 CM/SEC
BH CV ECHO MEAS - MV E/A: 0.61
BH CV ECHO MEAS - MV MAX PG: 4.1 MMHG
BH CV ECHO MEAS - MV MEAN PG: 1.6 MMHG
BH CV ECHO MEAS - MV P1/2T: 82.8 MSEC
BH CV ECHO MEAS - MV V2 VTI: 20 CM
BH CV ECHO MEAS - MVA(P1/2T): 2.7 CM2
BH CV ECHO MEAS - MVA(VTI): 3.2 CM2
BH CV ECHO MEAS - PA ACC TIME: 0.08 SEC
BH CV ECHO MEAS - PA PR(ACCEL): 41 MMHG
BH CV ECHO MEAS - PA V2 MAX: 105.9 CM/SEC
BH CV ECHO MEAS - PI END-D VEL: 123.5 CM/SEC
BH CV ECHO MEAS - PULM A REVS DUR: 0.16 SEC
BH CV ECHO MEAS - PULM A REVS VEL: 28.3 CM/SEC
BH CV ECHO MEAS - PULM DIAS VEL: 33.8 CM/SEC
BH CV ECHO MEAS - PULM S/D: 1.29
BH CV ECHO MEAS - PULM SYS VEL: 43.7 CM/SEC
BH CV ECHO MEAS - QP/QS: 1.48
BH CV ECHO MEAS - RAP SYSTOLE: 3 MMHG
BH CV ECHO MEAS - RV MAX PG: 2.8 MMHG
BH CV ECHO MEAS - RV V1 MAX: 84 CM/SEC
BH CV ECHO MEAS - RV V1 VTI: 17.8 CM
BH CV ECHO MEAS - RVOT DIAM: 2.6 CM
BH CV ECHO MEAS - RVSP: 29 MMHG
BH CV ECHO MEAS - SI(MOD-SP2): 35.1 ML/M2
BH CV ECHO MEAS - SI(MOD-SP4): 32.1 ML/M2
BH CV ECHO MEAS - SUP REN AO DIAM: 2.1 CM
BH CV ECHO MEAS - SV(LVOT): 63.9 ML
BH CV ECHO MEAS - SV(MOD-SP2): 70 ML
BH CV ECHO MEAS - SV(MOD-SP4): 64 ML
BH CV ECHO MEAS - SV(RVOT): 94.5 ML
BH CV ECHO MEAS - TAPSE (>1.6): 1.69 CM
BH CV ECHO MEAS - TR MAX PG: 26.1 MMHG
BH CV ECHO MEAS - TR MAX VEL: 255.3 CM/SEC
BH CV ECHO MEASUREMENTS AVERAGE E/E' RATIO: 7.52
BH CV XLRA - RV BASE: 3.2 CM
BH CV XLRA - RV LENGTH: 7.4 CM
BH CV XLRA - RV MID: 2.7 CM
BH CV XLRA - TDI S': 11.7 CM/SEC
LEFT ATRIUM VOLUME INDEX: 24.4 ML/M2
MAXIMAL PREDICTED HEART RATE: 163 BPM
SINUS: 3.1 CM
STJ: 2.6 CM
STRESS TARGET HR: 139 BPM

## 2022-06-20 PROCEDURE — 93356 MYOCRD STRAIN IMG SPCKL TRCK: CPT

## 2022-06-20 PROCEDURE — 73522 X-RAY EXAM HIPS BI 3-4 VIEWS: CPT | Performed by: ORTHOPAEDIC SURGERY

## 2022-06-20 PROCEDURE — 93306 TTE W/DOPPLER COMPLETE: CPT | Performed by: INTERNAL MEDICINE

## 2022-06-20 PROCEDURE — 93306 TTE W/DOPPLER COMPLETE: CPT

## 2022-06-20 PROCEDURE — 99203 OFFICE O/P NEW LOW 30 MIN: CPT | Performed by: ORTHOPAEDIC SURGERY

## 2022-06-20 PROCEDURE — 20610 DRAIN/INJ JOINT/BURSA W/O US: CPT | Performed by: ORTHOPAEDIC SURGERY

## 2022-06-20 PROCEDURE — 93356 MYOCRD STRAIN IMG SPCKL TRCK: CPT | Performed by: INTERNAL MEDICINE

## 2022-06-20 RX ORDER — LEVOTHYROXINE SODIUM 175 UG/1
TABLET ORAL
COMMUNITY
Start: 2022-04-14

## 2022-06-20 RX ORDER — LEFLUNOMIDE 20 MG/1
TABLET ORAL
COMMUNITY
Start: 2022-03-30

## 2022-06-20 RX ADMIN — METHYLPREDNISOLONE ACETATE 80 MG: 80 INJECTION, SUSPENSION INTRA-ARTICULAR; INTRALESIONAL; INTRAMUSCULAR; SOFT TISSUE at 15:46

## 2022-06-20 RX ADMIN — LIDOCAINE HYDROCHLORIDE 4 ML: 10 INJECTION, SOLUTION EPIDURAL; INFILTRATION; INTRACAUDAL; PERINEURAL at 15:46

## 2022-06-20 NOTE — PROGRESS NOTES
General Exam    Patient: Whitney Schofield    YOB: 1965    Medical Record Number: [id number]    Chief Complaints: Bilateral hip pain    History of Present Illness:     56 y.o. female patient who presents for Eyewise treatment bilateral hip pain.  Right worse than left.  Patient has a history of RA.  States her hips been bothering her for about a year mainly lateral and posterior pain sometimes groin pain.  Pain is worse with increased activity and stairs.  Denies any rating pain down her legs.  No recent trauma.    Denies any numbness or tingling.  Denies any fevers, cough or shortness of breath.    Allergies:   Allergies   Allergen Reactions   • Ferumoxytol      Pt was hospitalized   • Azithromycin Rash   • Sugammadex Hives     Possible reaction with hives/redness in PACU after TSA on 3/16/21.  After chart review, it looks like this was the only mediation that the patient had not previously received.       Home Medications:      Current Outpatient Medications:   •  albuterol sulfate  (90 Base) MCG/ACT inhaler, Inhale 2 puffs Every 4 (Four) Hours As Needed for Wheezing. 2 puffs every 4 hours when necessary dyspnea, Disp: 1 inhaler, Rfl: 1  •  bumetanide (BUMEX) 0.5 MG tablet, Take 2 tablets by mouth Daily As Needed (swelling)., Disp: 90 tablet, Rfl: 1  •  cloNIDine (CATAPRES) 0.2 MG tablet, Take 1 tablet by mouth 2 (Two) Times a Day. Take blood twice a day as needed for blood pressure over 180/90., Disp: 14 tablet, Rfl: 0  •  Ferrous Sulfate Dried 200 (65 FE) MG tablet, Take 200 mg by mouth Daily., Disp: , Rfl:   •  fluticasone-salmeterol (ADVAIR HFA) 115-21 MCG/ACT inhaler, Inhale 2 puffs 2 (Two) Times a Day., Disp: 1 inhaler, Rfl: 0  •  folic acid (FOLVITE) 1 MG tablet, Take 1 mg by mouth daily., Disp: , Rfl:   •  HYDROcodone-acetaminophen (NORCO)  MG per tablet, Take 1 tablet by mouth Every 6 (Six) Hours As Needed., Disp: , Rfl:   •  ipratropium-albuterol (DUO-NEB) 0.5-2.5 mg/3 ml  nebulizer, Take 3 mL by nebulization Every 4 (Four) Hours As Needed for Wheezing or Shortness of Air., Disp: 360 mL, Rfl: 0  •  leflunomide (ARAVA) 20 MG tablet, , Disp: , Rfl:   •  levothyroxine (Synthroid) 150 MCG tablet, Take 1 tablet by mouth Daily., Disp: 30 tablet, Rfl: 1  •  Levoxyl 175 MCG tablet, , Disp: , Rfl:   •  methotrexate 2.5 MG tablet, Take 20 mg by mouth 1 (One) Time Per Week., Disp: , Rfl:   •  metoprolol succinate XL (TOPROL-XL) 25 MG 24 hr tablet, Take 0.5 tablets by mouth Daily., Disp: 45 tablet, Rfl: 3  •  omeprazole (priLOSEC) 40 MG capsule, Take 40 mg by mouth Daily., Disp: , Rfl:   •  pantoprazole (PROTONIX) 40 MG EC tablet, Take 40 mg by mouth Daily., Disp: , Rfl:   •  vitamin D (ERGOCALCIFEROL) 18806 units capsule capsule, Take 50,000 Units by mouth 3 (Three) Times a Week., Disp: , Rfl:   •  amoxicillin (AMOXIL) 500 MG capsule, TAKE 4 TABLETS BY MOUTH 1 HOUR BEFORE DENTAL PROCEDURE, Disp: 4 capsule, Rfl: 3  •  cefuroxime (Ceftin) 250 MG/5ML suspension, Take 5 mL by mouth 2 (Two) Times a Day., Disp: 20 each, Rfl: 0  •  losartan (COZAAR) 25 MG tablet, Take 25 mg by mouth Daily As Needed., Disp: , Rfl:   •  methylPREDNISolone (MEDROL) 4 MG dose pack, Take as directed on package instructions., Disp: 21 tablet, Rfl: 0    Past Medical History:   Diagnosis Date   • Anemia    • ASD (atrial septal defect)    • Asthma    • Asystole (HCC)     Post ASD closure asystole and high-grade AV block.  s/p pacemaker 5/11/10.   • Celiac disease    • DVT (deep venous thrombosis) (HCC)     Left subclavian DVT following right atrial pacemaker lead revision in 2/14   • Edema    • Hashimoto's thyroiditis     s/p partial thyroidectomy 1/13/10   • Heart palpitations    • Hx of thyroid cancer    • Malignant neoplasm of thyroid gland (HCC)     Papillary carcinoma of the thyroid - s/p residual thyroidectomy in 2/11.   • Migraine    • Nonischemic cardiomyopathy (HCC)     EF as low as 35% 3/13.  EF 6/21/13 by CHANDANA was  50-55%, and 53% by echo on 6/17/14.  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  EF 50% by CHANDANA on 4/18/16.   • NSVT (nonsustained ventricular tachycardia) ([facility])     Noted by event recorder 9/12.  EP study by Dr. Aguilar on 11/20/12 showed no inducible VT.   • LUKAS on CPAP    • Papillary thyroid carcinoma (HCC) 08/27/2013   • Rheumatoid arthritis (HCC)     Severe and debilitating    • Secundum ASD     s/p open closure with a PeriPatch xenograft by Dr. Harper on 5/6/10 by Dr. Harper at [facility].   • Sleep apnea    • SOB (shortness of breath)     Multifactorial    • Thrombus due to any device, implant or graft     Extensive thrombus formation on the pacemaker leads by CHANDANA 3/25/13.  Initiated on Coumadin transiently at that time - improved significantly by CHANDANA 6/21/13.       Past Surgical History:   Procedure Laterality Date   • ASD AND VSD REPAIR     • CARDIAC ELECTROPHYSIOLOGY PROCEDURE N/A 1/18/2021    Procedure: Generator Change BI Ventriculat PPM- [employer];  Surgeon: Jose Mcdowell MD;  Location: [facility] CATH INVASIVE LOCATION;  Service: Cardiology;  Laterality: N/A;   • CHOLECYSTECTOMY  2007   • ENDOSCOPY AND COLONOSCOPY  12/30/2015    Tom Orta MD   • EXCISION MASS TRUNK Right 7/20/2018    Procedure: EXCISION MASS TRUNK, RUQ ABD WALL LIPOMA;  Surgeon: Eduar Marx MD;  Location: [facility] OR [facility];  Service: General   • PACEMAKER IMPLANTATION  05/2010    Inital dual-chamber pacemaker placed 5/11/10 after ASD closure surgery.  Had LV lead placement and generator change on 8/19/13 by Dr. Aguilar ([employer]onic #[id number]).  Then had right atrial lead revision on 2/19/14 (for right atrial lead dislodgement and diaphragmatic stimulation) - developed left subclavian DVT afterwards.  Then had laser lead extraction of the abandoned right atrial lead on 4/17/14.   • REPLACEMENT TOTAL KNEE BILATERAL     • SHOULDER SURGERY Right    • THYROID LOBECTOMY Right 2012   • THYROIDECTOMY Left 2011    Dr. Coronado,  "radioactive iodine X3   • TONSILLECTOMY     • TRICUSPID VALVE SURGERY      #32 MC3 annuloplasty ring by Dr. Harper on 5/6/10 (at time of ASD closure)       Social History     Occupational History   • Occupation:    Tobacco Use   • Smoking status: Never Smoker   • Smokeless tobacco: Never Used   Substance and Sexual Activity   • Alcohol use: No   • Drug use: No   • Sexual activity: Yes     Partners: Male      Social History     Social History Narrative   • Not on file       Family History   Problem Relation Age of Onset   • COPD Mother    • Heart failure Mother         CHF   • Lung disease Mother    • Hypertension Mother    • Diabetes Mother    • Coronary artery disease Mother    • Diabetes Sister    • Breast cancer Maternal Aunt    • Malig Hyperthermia Neg Hx        Review of Systems:      Constitutional: Denies fever, shaking or chills         All other pertinent positives and negatives as noted above in HPI.    Physical Exam: 56 y.o. female    Vitals:    06/20/22 1516   Weight: 89.8 kg (198 lb)   Height: 165.1 cm (65\")       General:  Patient is awake and alert.  Appears in no acute distress or discomfort.        Musculoskeletal/Extremities:    Bilateral lower extremities were examined there is positive tenderness palpation along the greater trochanters.  Overall hip range of motion is full and painless.  Negative straight leg raise incisional.  Motor and sensory intact distally.  Strength is about 4 out of 5 all muscle groups tested.  2+ pedal pulses.  Patient stand from seated position ambulates with normal gait unassisted         Radiology:       AP and lateral bilateral hips taken reviewed to evaluate the patient's complaint/s.    Imaging demonstrates mild to moderate degenerative changes with decreased joint space, some bone sclerosis and early osteophyte formation.  No acute fractures noted.     No imaging for comparison.    Assessment: Bilateral greater trochanteric bursitis, hip abductor " tendinitis      Plan:      Discussed the findings with the patient she appears to have more soft tissue irritation about the greater trochanters consistent with greater trochanteric bursitis.  Discussed conservative treatment the patient consisting of anti-inflammatory medication oral if able otherwise gel, activity modification, formal physical therapy and injections.  She like to proceed with the above but will do an injection on the right at this time to see how that goes.  She will do some formal physical therapy for the above told her this can take 6 to 8 weeks to resolve and can return thus the forms of therapy patient follow-up as needed.             All questions were answered.  Patient understands and agrees with the plan.    Seth South MD    06/20/2022    CC to Dmitri Schulte MD            Large Joint Arthrocentesis: R greater trochanteric bursa  Date/Time: 6/20/2022 3:46 PM  Consent given by: patient  Site marked: site marked  Timeout: Immediately prior to procedure a time out was called to verify the correct patient, procedure, equipment, support staff and site/side marked as required   Supporting Documentation  Indications: pain   Procedure Details  Location: hip - R greater trochanteric bursa  Preparation: Patient was prepped and draped in the usual sterile fashion  Needle gauge: 21 G.  Approach: lateral  Medications administered: 80 mg methylPREDNISolone acetate 80 MG/ML; 4 mL lidocaine PF 1% 1 %  Patient tolerance: patient tolerated the procedure well with no immediate complications

## 2022-06-21 ENCOUNTER — PATIENT ROUNDING (BHMG ONLY) (OUTPATIENT)
Dept: ORTHOPEDIC SURGERY | Facility: CLINIC | Age: 57
End: 2022-06-21

## 2022-06-21 RX ORDER — METHYLPREDNISOLONE ACETATE 80 MG/ML
80 INJECTION, SUSPENSION INTRA-ARTICULAR; INTRALESIONAL; INTRAMUSCULAR; SOFT TISSUE
Status: COMPLETED | OUTPATIENT
Start: 2022-06-20 | End: 2022-06-20

## 2022-06-21 RX ORDER — LIDOCAINE HYDROCHLORIDE 10 MG/ML
4 INJECTION, SOLUTION EPIDURAL; INFILTRATION; INTRACAUDAL; PERINEURAL
Status: COMPLETED | OUTPATIENT
Start: 2022-06-20 | End: 2022-06-20

## 2022-06-21 NOTE — PROGRESS NOTES
A [employer] Message has been sent to the patient for PATIENT ROUNDING with [facility] [place] [facility]

## 2022-09-07 ENCOUNTER — TELEPHONE (OUTPATIENT)
Dept: CARDIOLOGY | Facility: CLINIC | Age: 57
End: 2022-09-07

## 2022-09-07 NOTE — TELEPHONE ENCOUNTER
09/7/22   Per Dr OAKES wanted appt + pacemaker check moved up.  Appt has been moved to tomorrow 9/8/22 @2:40pm.    * Pacemaker Dept can you cancel her PM check on 9/14/22  And add her To tomorrow. She is aware to come in 1/2 hour early before her appt with Dr OAKES at 2:40pm.   Thanks Elmo GILL

## 2022-09-08 ENCOUNTER — OFFICE VISIT (OUTPATIENT)
Dept: CARDIOLOGY | Facility: CLINIC | Age: 57
End: 2022-09-08

## 2022-09-08 ENCOUNTER — CLINICAL SUPPORT NO REQUIREMENTS (OUTPATIENT)
Dept: CARDIOLOGY | Facility: CLINIC | Age: 57
End: 2022-09-08

## 2022-09-08 VITALS
HEART RATE: 77 BPM | HEIGHT: 65 IN | OXYGEN SATURATION: 98 % | BODY MASS INDEX: 33.49 KG/M2 | DIASTOLIC BLOOD PRESSURE: 82 MMHG | SYSTOLIC BLOOD PRESSURE: 122 MMHG | WEIGHT: 201 LBS

## 2022-09-08 DIAGNOSIS — Z87.74 STATUS POST PATCH CLOSURE OF ASD: Primary | ICD-10-CM

## 2022-09-08 DIAGNOSIS — Z98.890 STATUS POST TRICUSPID VALVE REPAIR: ICD-10-CM

## 2022-09-08 DIAGNOSIS — I42.8 NICM (NONISCHEMIC CARDIOMYOPATHY): ICD-10-CM

## 2022-09-08 DIAGNOSIS — I42.8 NONISCHEMIC CARDIOMYOPATHY: Primary | ICD-10-CM

## 2022-09-08 DIAGNOSIS — Z95.0 PRESENCE OF CARDIAC PACEMAKER: ICD-10-CM

## 2022-09-08 DIAGNOSIS — I50.42 CHRONIC COMBINED SYSTOLIC AND DIASTOLIC CONGESTIVE HEART FAILURE: ICD-10-CM

## 2022-09-08 PROCEDURE — 93281 PM DEVICE PROGR EVAL MULTI: CPT | Performed by: INTERNAL MEDICINE

## 2022-09-08 PROCEDURE — 99214 OFFICE O/P EST MOD 30 MIN: CPT | Performed by: INTERNAL MEDICINE

## 2022-09-08 RX ORDER — AMOXICILLIN AND CLAVULANATE POTASSIUM 875; 125 MG/1; MG/1
1 TABLET, FILM COATED ORAL 2 TIMES DAILY
Qty: 14 TABLET | Refills: 0 | Status: SHIPPED | OUTPATIENT
Start: 2022-09-08

## 2022-09-08 NOTE — PROGRESS NOTES
Date of Office Visit:  2022  Encounter Provider: Lion Marques MD  Place of Service: [facility] CARDIOLOGY  Patient Name: Whitney Schofield  :1965    Chief complaint: Follow-up for open ASD closure, nonischemic cardiomyopathy,   chronic combined CHF, tricuspid valve repair, and pacemaker placement.    History of Present Illness:    I again had the pleasure of seeing your patient in cardiology office on .  As  you well know, she is a very pleasant 56 year old white female with a past history  significant for multiple cardiovascular issues who presents for follow-up. The patient   originally presented to [facility] with symptoms of palpitations and  shortness of breath in 2009. She subsequently was diagnosed with Hashimoto  thyroiditis, and underwent a partial thyroidectomy in 2010. As part of a routine  follow-up, she had a two-dimensional echocardiogram performed on 3/1/2010 which   revealed a severely dilated right ventricle and significant pulmonary hypertension. A CT   scan of her chest did not show any evidence for pulmonary embolus or other acute lung   pathology.  She underwent a transesophageal echocardiogram on 3/16/2010 which   revealed a large secundum ASD high in the atrial septum. 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 She was referred to   Dr. Harper for evaluation to surgically repair the ASD, and underwent a preoperative   cardiac catheterization on 3/26/2010. This revealed angiographically normal coronary   arteries, with a Qp:Qs of 2.3, consistent with a large shunt. The patient underwent   closure of the ASD, as well as tricuspid valve repair with an annuloplasty ring by Dr. Harper at [facility] on 2010. Her postoperative period was complicated by   complete AV block and accelerated junctional escape rhythm. She underwent   placement of a permanent pacemaker  "on 5/11/2010.      The patient continued to have significant shortness of breath and a 24-hour Holter   monitor and two-dimensional echocardiogram were obtained on 5/12/2011. Her   Holter monitor did show several brief episodes of tachycardia. The echocardiogram   did not show any significant findings other than a \"shuttering motion\" of the septum.   She underwent a right and left heart catheterization on 7/20/2011 to assess for any   evidence of constriction. There was no evidence of restriction or constriction on the   heart catheterization, although she did have pulmonary hypertension with a mean   pulmonary artery pressure of 30 mmHg. A transesophageal echocardiogram was also   performed on 6/20/2011 which revealed an ejection fraction of 55-60%, and an intact   ASD repair. An electrophysiology study was performed by Dr. Foster on 8/1/2011.   This was performed through her pacemaker in conjunction with an isoproterenol   infusion. This showed no inducible tachycardia or ventricular tachycardia.       The patient eventually wore a 21-day event recorder and was noted to go into   wide-complex tachycardia on 9/30/2012. She had multiple other episodes as well.   Dr. Aguilar from Electrophysiology saw the patient and felt this represented ventricular   tachycardia. She did ultimately undergo a repeat electrophysiology study on   11/20/2012 by Dr. Aguilar which confirmed complete infra-hisian block with no   conduction retrograde or antegrade. No ventricular tachycardia was induced. She   was started on beta blockers at that time.       At the patient's visit in 3/2013 she complained of severe volume retention and edema.    She was also more short of breath and had gained a significant amount of weight.   She was admitted to the hospital on 3/25/2013 and underwent a transesophageal   echocardiogram.  Her ejection fraction had decreased significantly to 35%, and there   was an extensive amount of thrombus formation on her " pacemaker wires. Her ASD   repair appeared to be in good condition. She did undergo a CT scan of the chest   which showed no evidence for pulmonary embolism, as well as a CT scan of the   abdomen which showed no evidence for inferior vena cava obstruction or clotting.   She was diuresed extensively and placed on Coumadin at that time. 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 She ultimately underwent left   ventricular lead placement and pacemaker replacement on 8/19/2013 by Dr. Aguilar.   She then had right atrial lead dislodgement and diaphragmatic stimulation. A right   atrial lead revision was performed on 2/19/2014; however, the patient developed a   significant left subclavian vein thrombosis post procedurally. She again was placed   on Coumadin at that time, which was then switched to Xarelto. She ultimately   underwent laser lead extraction of the abandoned right atrial lead on 4/17/2014. She   did undergo a right heart cath on 4/13/2015 which showed only mild pulmonary   hypertension (mean PA pressure 29 mm Hg).      The patient presents today for follow-up.  She continues to have severe issues from   her rheumatoid arthritis, and she is actually going to be filing for disability.  She can   barely walk at this point.  She still gets some shortness of breath with exertion,   although she is still limited in her mobility from the rheumatoid arthritis.  She has not   had any chest pain.  Her disability issues are discussed below.    Past Medical History:   Diagnosis Date   • Anemia    • ASD (atrial septal defect)    • Asthma    • Asystole (HCC)     Post ASD closure asystole and high-grade AV block.  s/p pacemaker 5/11/10.   • Celiac disease    • DVT (deep venous thrombosis) (HCC)     Left subclavian DVT following right atrial pacemaker lead revision in 2/14   • Edema    • Hashimoto's thyroiditis     s/p partial thyroidectomy 1/13/10   • Heart palpitations    • Hx of thyroid cancer    •  Malignant neoplasm of thyroid gland (HCC)     Papillary carcinoma of the thyroid - s/p residual thyroidectomy in 2/11.   • Migraine    • Nonischemic cardiomyopathy (HCC)     EF as low as 35% 3/13.  EF 6/21/13 by CHANDANA was 50-55%, and 53% by echo on 6/17/14.  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  EF 50% by CHANDANA on 4/18/16.   • NSVT (nonsustained ventricular tachycardia) ([facility])     Noted by event recorder 9/12.  EP study by Dr. Aguilar on 11/20/12 showed no inducible VT.   • LUKAS on CPAP    • Papillary thyroid carcinoma (HCC) 08/27/2013   • Rheumatoid arthritis (HCC)     Severe and debilitating    • Secundum ASD     s/p open closure with a PeriPatch xenograft by Dr. Harper on 5/6/10 by Dr. Harper at [facility].   • Sleep apnea    • SOB (shortness of breath)     Multifactorial    • Thrombus due to any device, implant or graft     Extensive thrombus formation on the pacemaker leads by CHANDANA 3/25/13.  Initiated on Coumadin transiently at that time - improved significantly by CHANDANA 6/21/13.       Past Surgical History:   Procedure Laterality Date   • ASD AND VSD REPAIR     • CARDIAC ELECTROPHYSIOLOGY PROCEDURE N/A 1/18/2021    Procedure: Generator Change BI Ventriculat PPM- Medtronic;  Surgeon: Jose Mcdowell MD;  Location: [facility] CATH INVASIVE LOCATION;  Service: Cardiology;  Laterality: N/A;   • CHOLECYSTECTOMY  2007   • ENDOSCOPY AND COLONOSCOPY  12/30/2015    Tom Orta MD   • EXCISION MASS TRUNK Right 7/20/2018    Procedure: EXCISION MASS TRUNK, RUQ ABD WALL LIPOMA;  Surgeon: Eduar Marx MD;  Location: [facility] OR [facility];  Service: General   • PACEMAKER IMPLANTATION  05/2010    Inital dual-chamber pacemaker placed 5/11/10 after ASD closure surgery.  Had LV lead placement and generator change on 8/19/13 by Dr. Aguilar (Medtronic #[id number]).  Then had right atrial lead revision on 2/19/14 (for right atrial lead dislodgement and diaphragmatic stimulation) - developed left subclavian DVT afterwards.   Then had laser lead extraction of the abandoned right atrial lead on 4/17/14.   • REPLACEMENT TOTAL KNEE BILATERAL     • SHOULDER SURGERY Right    • THYROID LOBECTOMY Right 2012   • THYROIDECTOMY Left 2011    Dr. Coronado, radioactive iodine X3   • TONSILLECTOMY     • TRICUSPID VALVE SURGERY      #32 MC3 annuloplasty ring by Dr. Harper on 5/6/10 (at time of ASD closure)       Current Outpatient Medications on File Prior to Visit   Medication Sig Dispense Refill   • albuterol sulfate  (90 Base) MCG/ACT inhaler Inhale 2 puffs Every 4 (Four) Hours As Needed for Wheezing. 2 puffs every 4 hours when necessary dyspnea 1 inhaler 1   • amoxicillin (AMOXIL) 500 MG capsule TAKE 4 TABLETS BY MOUTH 1 HOUR BEFORE DENTAL PROCEDURE 4 capsule 3   • bumetanide (BUMEX) 0.5 MG tablet Take 2 tablets by mouth Daily As Needed (swelling). 90 tablet 1   • cefuroxime (Ceftin) 250 MG/5ML suspension Take 5 mL by mouth 2 (Two) Times a Day. 20 each 0   • cloNIDine (CATAPRES) 0.2 MG tablet Take 1 tablet by mouth 2 (Two) Times a Day. Take blood twice a day as needed for blood pressure over 180/90. 14 tablet 0   • Ferrous Sulfate Dried 200 (65 FE) MG tablet Take 200 mg by mouth Daily.     • fluticasone-salmeterol (ADVAIR HFA) 115-21 MCG/ACT inhaler Inhale 2 puffs 2 (Two) Times a Day. 1 inhaler 0   • folic acid (FOLVITE) 1 MG tablet Take 1 mg by mouth daily.     • HYDROcodone-acetaminophen (NORCO)  MG per tablet Take 1 tablet by mouth Every 6 (Six) Hours As Needed.     • ipratropium-albuterol (DUO-NEB) 0.5-2.5 mg/3 ml nebulizer Take 3 mL by nebulization Every 4 (Four) Hours As Needed for Wheezing or Shortness of Air. 360 mL 0   • leflunomide (ARAVA) 20 MG tablet      • levothyroxine (Synthroid) 150 MCG tablet Take 1 tablet by mouth Daily. 30 tablet 1   • Levoxyl 175 MCG tablet      • losartan (COZAAR) 25 MG tablet Take 25 mg by mouth Daily As Needed.     • methotrexate 2.5 MG tablet Take 20 mg by mouth 1 (One) Time Per Week.     •  "methylPREDNISolone (MEDROL) 4 MG dose pack Take as directed on package instructions. 21 tablet 0   • metoprolol succinate XL (TOPROL-XL) 25 MG 24 hr tablet Take 0.5 tablets by mouth Daily. 45 tablet 3   • omeprazole (priLOSEC) 40 MG capsule Take 40 mg by mouth Daily.     • pantoprazole (PROTONIX) 40 MG EC tablet Take 40 mg by mouth Daily.     • vitamin D (ERGOCALCIFEROL) 67366 units capsule capsule Take 50,000 Units by mouth 3 (Three) Times a Week.       No current facility-administered medications on file prior to visit.     Allergies as of 09/08/2022 - Reviewed 09/08/2022   Allergen Reaction Noted   • Ferumoxytol  02/10/2017   • Azithromycin Rash 06/02/2012   • Sugammadex Hives 03/16/2021     Social History     Socioeconomic History   • Marital status:    • Number of children: 2   Tobacco Use   • Smoking status: Never Smoker   • Smokeless tobacco: Never Used   Substance and Sexual Activity   • Alcohol use: No   • Drug use: No   • Sexual activity: Yes     Partners: Male     Family History   Problem Relation Age of Onset   • COPD Mother    • Heart failure Mother         CHF   • Lung disease Mother    • Hypertension Mother    • Diabetes Mother    • Coronary artery disease Mother    • Diabetes Sister    • Breast cancer Maternal Aunt    • Malig Hyperthermia Neg Hx        Review of Systems   Constitutional: Positive for malaise/fatigue.   Cardiovascular: Positive for leg swelling.   Respiratory: Positive for shortness of breath.    Hematologic/Lymphatic: Bruises/bleeds easily.   Musculoskeletal: Positive for joint pain and joint swelling.   Gastrointestinal: Positive for diarrhea and nausea.   Neurological: Positive for excessive daytime sleepiness.   Psychiatric/Behavioral: The patient is nervous/anxious.    All other systems reviewed and are negative.     Objective:     Vitals:    09/08/22 1500   BP: 122/82   Pulse: 77   SpO2: 98%   Weight: 91.2 kg (201 lb)   Height: 165.1 cm (65\")     Body mass index is 33.45 " kg/m².    Physical Exam  Constitutional:       Appearance: She is well-developed.   HENT:      Head: Normocephalic and atraumatic.   Eyes:      Conjunctiva/sclera: Conjunctivae normal.   Cardiovascular:      Rate and Rhythm: Normal rate and regular rhythm.      Heart sounds: Murmur heard.    Systolic murmur is present with a grade of 2/6 at the upper left sternal border and lower left sternal border.    No friction rub.   Pulmonary:      Effort: Pulmonary effort is normal.      Breath sounds: Normal breath sounds. No rales.   Abdominal:      Palpations: Abdomen is soft.      Tenderness: There is no abdominal tenderness.   Musculoskeletal:      Cervical back: Neck supple.      Comments: Trace edema of the lower extremities bilaterally.  Rheumatoid changes in the digits   Skin:     General: Skin is warm.   Neurological:      Mental Status: She is alert and oriented to person, place, and time.   Psychiatric:         Behavior: Behavior normal.       Lab Review:   Procedures    Cardiac Procedures:  1. Status post closure of a large secundum ASD with a PeriPatch xenograft with concomitant  tricuspid valve repair with a #32 MC3 annuloplasty ring by Dr. Jaime Harper on 5/6/2010   at [facility].    2. Left heart catheterization on 3/26/2010 showed angiographically normal coronary  arteries.    3. Transesophageal echocardiogram on 6/21/2013 revealed an ejection fraction of 50-55%.    4. Status post placement of a dual chamber permanent pacemaker by Dr. Bush on   5/11/2010 following postoperative asystole and complete AV block after her ASD surgery.    5. Status post left ventricular lead placement and pacemaker replacement on 08/19/2013 by  Dr. Aguilar. At that time she had a left ventricular lead added, to make this a  biventricular pacemaker. The generator was also changed and is a Medtronic #[id number].    6. Status post right atrial lead revision on 2/19/2014. This was secondary to right atrial   lead dislodgement and  diaphragmatic stimulation. She did develop a left subclavian vein   thrombosis following the procedure.    7. Status post laser lead extraction of the abandoned right atrial lead on 4/17/2014 by Dr. Christiano Aguilar at [facility].    8. CHANDANA on 4/18/2016: Ejection fraction was 50%, the left ventricle was mildly dilated, there   was grade 1 diastolic dysfunction, the ASD closure site was intact with no evidence for   shunting, there was no evidence of vegetation or thrombus formation on the pacemaker   leads, there was mild mitral regurgitation, and there was trace to mild tricuspid regurgitation   through the annuloplasty ring.  10.  Echocardiogram on 10/10/2018: There was akinesis of the basal to mid inferolateral   wall.  Ejection fraction was 52%.  There was grade 1 diastolic dysfunction.  There was   trace tricuspid regurgitation through the annuloplasty ring.  11.  Lexiscan Myoview stress test on 10/10/2018: Normal with no ischemia.  12.  Echocardiogram on 2/12/2020: The ejection fraction was 54%.  The tricuspid valve   repair was normal.  There was trace tricuspid regurgitation.  13.  Status post Medtronic pacemaker generator change on 1/18/2021 by Dr. Mcdowell.  14.  Echocardiogram on 6/20/2022: Ejection fraction was 50 to 55%.  Septal wall motion   was consistent with right ventricular pacing.  There was grade 1 diastolic dysfunction.    The right ventricle was normal.  The left atrium was mildly enlarged.  There was mild   tricuspid regurgitation.      Assessment:       Diagnosis Plan   1. Status post patch closure of ASD     2. NICM (nonischemic cardiomyopathy) (HCC)     3. Chronic combined systolic and diastolic congestive heart failure (HCC)     4. Status post tricuspid valve repair     5. Presence of cardiac pacemaker       Plan:       Her main issue continues to be rheumatoid arthritis, which is severe.  From a disability perspective, I completely support this.  She still gets out of breath and has  palpitations at times, which normally happens when her rheumatoid arthritis is escalated.  She has severe joint pain, and can barely move.  She has a hard time standing and walking at this point, and she has severe rheumatoid changes of her digits in the hands and feet.  Her salivary glands are only working at 15%, and she has fatigue and body.  She also has celiac disease, and Hashimoto's thyroiditis, which also further limits her abilities.  She also has a mild nonischemic cardiomyopathy from a cardiac perspective, as well as a pacemaker.  I fully support her claim for disability, and I sincerely feel that she needs disability at this point.  I am not sure how she has actually managed to work for the last several years, and I have encouraged her to do this.    She will continue on the Toprol-XL and losartan.  Her blood pressure has been good at home.  She only takes Bumex 1 mg as needed when she has edema.  She takes these several times a month on average.  She does not have any significant edema today.  Her echocardiogram on 6/20/2022 showed mild regurgitation through the tricuspid valve repair.  Her ejection fraction was 50 to 55%.  She has not had any issues with the ASD closure since it was performed years ago.  Her pacemaker was successfully changed on 1/18/2021.  She is pacemaker dependent from third-degree AV block resulting from her cardiac surgery.  Her pacemaker interrogation shows no significant issues and good battery life.    I will have her follow-up in 6 months.

## 2022-09-09 ENCOUNTER — TELEPHONE (OUTPATIENT)
Dept: CARDIOLOGY | Facility: CLINIC | Age: 57
End: 2022-09-09

## 2022-09-09 NOTE — TELEPHONE ENCOUNTER
Pt had MDT CRT-P device seen in clinic on 9/9/2022. Pt had an AT/AF labeled event on logbook and after reviewing EGM, it does not appear to be AF but possibly farfield oversensing? Included EGM below for you to review. No other events similar to this has been recorded and no oversensing was noted in the pt's presenting rhythm.

## 2022-10-19 PROCEDURE — 93296 REM INTERROG EVL PM/IDS: CPT | Performed by: INTERNAL MEDICINE

## 2022-10-19 PROCEDURE — 93294 REM INTERROG EVL PM/LDLS PM: CPT | Performed by: INTERNAL MEDICINE

## 2022-10-21 DIAGNOSIS — M06.9 RHEUMATOID ARTHRITIS, INVOLVING UNSPECIFIED SITE, UNSPECIFIED WHETHER RHEUMATOID FACTOR PRESENT: Primary | ICD-10-CM

## 2022-10-21 DIAGNOSIS — E03.9 HYPOTHYROIDISM, UNSPECIFIED TYPE: Primary | ICD-10-CM

## 2023-01-05 DIAGNOSIS — I42.9 CARDIOMYOPATHY, UNSPECIFIED TYPE: Primary | ICD-10-CM

## 2023-01-18 PROCEDURE — 93296 REM INTERROG EVL PM/IDS: CPT | Performed by: INTERNAL MEDICINE

## 2023-01-18 PROCEDURE — 93294 REM INTERROG EVL PM/LDLS PM: CPT | Performed by: INTERNAL MEDICINE

## 2023-03-09 ENCOUNTER — CLINICAL SUPPORT NO REQUIREMENTS (OUTPATIENT)
Dept: CARDIOLOGY | Facility: CLINIC | Age: 58
End: 2023-03-09
Payer: OTHER GOVERNMENT

## 2023-03-09 ENCOUNTER — OFFICE VISIT (OUTPATIENT)
Dept: CARDIOLOGY | Facility: CLINIC | Age: 58
End: 2023-03-09
Payer: OTHER GOVERNMENT

## 2023-03-09 VITALS
BODY MASS INDEX: 33.49 KG/M2 | HEIGHT: 65 IN | OXYGEN SATURATION: 99 % | SYSTOLIC BLOOD PRESSURE: 122 MMHG | HEART RATE: 63 BPM | DIASTOLIC BLOOD PRESSURE: 80 MMHG | WEIGHT: 201 LBS

## 2023-03-09 DIAGNOSIS — I42.8 NONISCHEMIC CARDIOMYOPATHY: Primary | ICD-10-CM

## 2023-03-09 DIAGNOSIS — Z95.0 PRESENCE OF CARDIAC PACEMAKER: Primary | ICD-10-CM

## 2023-03-09 PROCEDURE — 93281 PM DEVICE PROGR EVAL MULTI: CPT | Performed by: INTERNAL MEDICINE

## 2023-03-09 PROCEDURE — 93000 ELECTROCARDIOGRAM COMPLETE: CPT | Performed by: NURSE PRACTITIONER

## 2023-03-09 PROCEDURE — 99214 OFFICE O/P EST MOD 30 MIN: CPT | Performed by: NURSE PRACTITIONER

## 2023-03-09 NOTE — TELEPHONE ENCOUNTER
IZABELAI:     This was a pt of Dr. Mcdowell that is now being followed by you. Pt has a MDT CRT-P device. She originally had a dual chamber pacemaker implanted in 2010 for CHB that was upgraded to a CRT-D d/t possible pacemaker induced cardiomyopathy in 2013. She had a RA lead dislodgement with a revision performed in February 2014 with extraction of the abandoned lead in April 2014 and a gen change performed January 2021. Device details included below:       Pt has a hx of higher RV and LV impedances with RV impedance resulting at 2,318 ohms today and LVtip-RVring impedance resulting at 1,577 ohms.             RV and LV thresholds result higher per hx as well but appear to be stable with today's result of:  * RV: 1.75V@0.4msX2  * LV: 2V@1ms    RV is programmed to auto, LV auto is off and programmed at 3.25V@1ms giving a little over a 1x safety margin with more than adequate 2x safety margin present in RV. Pt is dependent with CRT pacing percentage of 99.9%.     I just wanted to make you aware of the impedance and threshold numbers since you are now following this pt and I wanted to check to see if you need to see this pt in the office. Pt was seeing Dr. Marques but is now being followed by Dr. Viera.

## 2023-03-09 NOTE — PROGRESS NOTES
[facility] Follow Up Office Note     Encounter Date:23  Patient:Whitney Schofield  :1965  MRN:[id number]      Chief Complaint:   Chief Complaint   Patient presents with   • Follow-up         History of Presenting Illness:        Whitney Schofield is a 57 y.o. female who is here for follow-up.  She is a patient of Dr Viera.  She was previously a patient of Dr. Marques who was transition to inpatient only role.    Patient has a past medical history that is significant for large secundum ASD with surgical closure and concomitant tricuspid valve repair in  at [facility], dual-chamber permanent pacemaker  following postoperative asystole and complete AV block, multiple pacemaker and lead replacements since then most recently 2021, and mild nonischemic cardiomyopathy.    Patient was last seen by Dr. Marques in 2022.  At this time she was having issues with severe rheumatoid arthritis.  She did report with severe RA flare she had palpitations and some dyspnea at times.    Today patient continues to report rheumatoid arthritis has been difficult to control and very limiting.  She was recently seen by rheumatology and her inflammatory markers were elevated, they are considering alternative therapies.  She reports fatigue.  She denies edema, worsening dyspnea, orthopnea, palpitations.  She has occasional episodes of feeling lightheaded, these occur at random and resolve on their own.    Device was interrogated today.        Review of Systems:  Review of Systems   Cardiovascular: Negative for chest pain, dyspnea on exertion, leg swelling, orthopnea and palpitations.   Respiratory: Negative for shortness of breath.        Current Outpatient Medications on File Prior to Visit   Medication Sig Dispense Refill   • albuterol sulfate  (90 Base) MCG/ACT inhaler Inhale 2 puffs Every 4 (Four) Hours As Needed for Wheezing. 2 puffs every 4 hours when necessary  dyspnea 1 inhaler 1   • amoxicillin-clavulanate (Augmentin) 875-125 MG per tablet Take 1 tablet by mouth 2 (Two) Times a Day. 14 tablet 0   • bumetanide (BUMEX) 0.5 MG tablet Take 2 tablets by mouth Daily As Needed (swelling). 90 tablet 1   • cloNIDine (CATAPRES) 0.2 MG tablet Take 1 tablet by mouth 2 (Two) Times a Day. Take blood twice a day as needed for blood pressure over 180/90. 14 tablet 0   • Ferrous Sulfate Dried 200 (65 FE) MG tablet Take 1 tablet by mouth Daily.     • fluticasone-salmeterol (ADVAIR HFA) 115-21 MCG/ACT inhaler Inhale 2 puffs 2 (Two) Times a Day. 1 inhaler 0   • folic acid (FOLVITE) 1 MG tablet Take 1 tablet by mouth Daily.     • HYDROcodone-acetaminophen (NORCO)  MG per tablet Take 1 tablet by mouth Every 6 (Six) Hours As Needed.     • ipratropium-albuterol (DUO-NEB) 0.5-2.5 mg/3 ml nebulizer Take 3 mL by nebulization Every 4 (Four) Hours As Needed for Wheezing or Shortness of Air. 360 mL 0   • leflunomide (ARAVA) 20 MG tablet      • levothyroxine (Synthroid) 150 MCG tablet Take 1 tablet by mouth Daily. 30 tablet 1   • Levoxyl 175 MCG tablet      • losartan (COZAAR) 25 MG tablet Take 1 tablet by mouth Daily As Needed.     • methotrexate 2.5 MG tablet Take 8 tablets by mouth 1 (One) Time Per Week.     • methylPREDNISolone (MEDROL) 4 MG dose pack Take as directed on package instructions. 21 tablet 0   • metoprolol succinate XL (TOPROL-XL) 25 MG 24 hr tablet Take 0.5 tablets by mouth Daily. 45 tablet 3   • omeprazole (priLOSEC) 40 MG capsule Take 1 capsule by mouth Daily.     • pantoprazole (PROTONIX) 40 MG EC tablet Take 1 tablet by mouth Daily.     • vitamin D (ERGOCALCIFEROL) 92388 units capsule capsule Take 1 capsule by mouth 3 (Three) Times a Week.       No current facility-administered medications on file prior to visit.       Allergies   Allergen Reactions   • Ferumoxytol      Pt was hospitalized   • Azithromycin Rash   • Sugammadex Hives     Possible reaction with hives/redness  in PACU after TSA on 3/16/21.  After chart review, it looks like this was the only mediation that the patient had not previously received.       Past Medical History:   Diagnosis Date   • Anemia    • ASD (atrial septal defect)    • Asthma    • Asystole (HCC)     Post ASD closure asystole and high-grade AV block.  s/p pacemaker 5/11/10.   • Celiac disease    • DVT (deep venous thrombosis) (HCC)     Left subclavian DVT following right atrial pacemaker lead revision in 2/14   • Edema    • Hashimoto's thyroiditis     s/p partial thyroidectomy 1/13/10   • Heart palpitations    • Hx of thyroid cancer    • Malignant neoplasm of thyroid gland (HCC)     Papillary carcinoma of the thyroid - s/p residual thyroidectomy in 2/11.   • Migraine    • Nonischemic cardiomyopathy (HCC)     EF as low as 35% 3/13.  EF 6/21/13 by CHANDANA was 50-55%, and 53% by echo on 6/17/14.  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  EF 50% by CHANDANA on 4/18/16.   • NSVT (nonsustained ventricular tachycardia)     Noted by event recorder 9/12.  EP study by Dr. Aguilar on 11/20/12 showed no inducible VT.   • LUKAS on CPAP    • Papillary thyroid carcinoma (HCC) 08/27/2013   • Rheumatoid arthritis (HCC)     Severe and debilitating    • Secundum ASD     s/p open closure with a PeriPatch xenograft by Dr. Harper on 5/6/10 by Dr. Harper at [facility].   • Sleep apnea    • SOB (shortness of breath)     Multifactorial    • Thrombus due to any device, implant or graft     Extensive thrombus formation on the pacemaker leads by CHANDANA 3/25/13.  Initiated on Coumadin transiently at that time - improved significantly by CHANDANA 6/21/13.       Past Surgical History:   Procedure Laterality Date   • ASD AND VSD REPAIR     • CARDIAC ELECTROPHYSIOLOGY PROCEDURE N/A 1/18/2021    Procedure: Generator Change BI Ventriculat PPM- Medtronic;  Surgeon: Jose Mcdowell MD;  Location: [facility] INVASIVE LOCATION;  Service: Cardiology;  Laterality: N/A;   • CHOLECYSTECTOMY   2007   • ENDOSCOPY AND COLONOSCOPY  12/30/2015    Tom Orta MD   • EXCISION MASS TRUNK Right 7/20/2018    Procedure: EXCISION MASS TRUNK, RUQ ABD WALL LIPOMA;  Surgeon: Eduar Marx MD;  Location: [facility] OR [facility];  Service: General   • PACEMAKER IMPLANTATION  05/2010    Inital dual-chamber pacemaker placed 5/11/10 after ASD closure surgery.  Had LV lead placement and generator change on 8/19/13 by Dr. Aguilar (Medtronic #[id number]).  Then had right atrial lead revision on 2/19/14 (for right atrial lead dislodgement and diaphragmatic stimulation) - developed left subclavian DVT afterwards.  Then had laser lead extraction of the abandoned right atrial lead on 4/17/14.   • REPLACEMENT TOTAL KNEE BILATERAL     • SHOULDER SURGERY Right    • THYROID LOBECTOMY Right 2012   • THYROIDECTOMY Left 2011    Dr. Coronado, radioactive iodine X3   • TONSILLECTOMY     • TRICUSPID VALVE SURGERY      #32 MC3 annuloplasty ring by Dr. Harper on 5/6/10 (at time of ASD closure)       Social History     Socioeconomic History   • Marital status:    • Number of children: 2   Tobacco Use   • Smoking status: Never   • Smokeless tobacco: Never   Substance and Sexual Activity   • Alcohol use: No   • Drug use: No   • Sexual activity: Yes     Partners: Male       Family History   Problem Relation Age of Onset   • COPD Mother    • Heart failure Mother         CHF   • Lung disease Mother    • Hypertension Mother    • Diabetes Mother    • Coronary artery disease Mother    • Diabetes Sister    • Breast cancer Maternal Aunt    • Malig Hyperthermia Neg Hx        The following portions of the patient's history were reviewed and updated as appropriate: allergies, current medications, past family history, past medical history, past social history, past surgical history and problem list.       Objective:       Vitals:    03/09/23 1400   BP: 122/80   BP Location: Left arm   Patient Position: Sitting   Cuff Size: Adult   Pulse: 63   SpO2: 99%  "  Weight: 91.2 kg (201 lb)   Height: 165.1 cm (65\")         Physical Exam:  Constitutional: Well appearing, well developed, no acute distress   HENT: Oropharynx clear and membrane moist  Eyes: Normal conjunctiva, no sclera icterus  Neck: Supple, no carotid bruit bilaterally  Cardiovascular: Regular rate and rhythm, No Murmur, No bilateral lower extremity edema  Pulmonary: Normal respiratory effort, normal lung sounds, no wheezing  Neurological: Alert and orient x 3  Skin: Warm, dry, no ecchymosis, no rash  Psych: Appropriate mood and affect. Normal judgment and insight         Lab Results   Component Value Date     05/18/2022     03/04/2021    K 4.4 05/18/2022    K 4.4 03/04/2021     05/18/2022     03/04/2021    CO2 24 05/18/2022    CO2 27.8 03/04/2021    BUN 16 05/18/2022    BUN 16 03/04/2021    CREATININE 0.73 05/18/2022    CREATININE 0.79 03/04/2021    EGFRIFNONA 75 03/04/2021    EGFRIFNONA 69 01/14/2021    GLUCOSE 103 (H) 03/04/2021    GLUCOSE 109 (H) 01/14/2021    CALCIUM 8.4 05/18/2022    CALCIUM 9.0 03/04/2021    ALBUMIN 4.1 05/18/2022    ALBUMIN 4.20 03/04/2021    BILITOT 0.5 05/18/2022    BILITOT 0.4 03/04/2021    AST 16 03/01/2023    AST 14 11/30/2022    ALT 13 03/01/2023    ALT 12 11/30/2022     Lab Results   Component Value Date    WBC Canceled by 778 11/30/2022    WBC 7.28 05/10/2022    HGB 12.1 05/10/2022    HGB 11.8 (L) 02/10/2022    HCT 39.2 05/10/2022    HCT 38.6 02/10/2022    MCV 83.2 05/10/2022    MCV 86.2 02/10/2022     05/10/2022     02/10/2022     Lab Results   Component Value Date    CHOL 173 03/04/2021    TRIG 84 03/04/2021    HDL 49 03/04/2021     (H) 03/04/2021     No results found for: PROBNP, BNP  No results found for: CKTOTAL, CKMB, CKMBINDEX, TROPONINI, TROPONINT  Lab Results   Component Value Date    TSH 0.011 (L) 07/14/2021    TSH 0.033 (L) 03/04/2021           ECG 12 Lead    Date/Time: 3/9/2023 2:32 PM  Performed by: Molly Hudson " RAHAT  Authorized by: Molly Hudson APRN   Comparison: compared with previous ECG from 11/18/2019  Rhythm: paced  Pacing: atrial sensed rhythm and ventricular paced rhythm             Assessment:          Diagnosis Plan   1. Presence of cardiac pacemaker  ECG 12 Lead             Plan:       Nonischemic cardiomyopathy // chronic systolic and diastolic heart failure  · Just mild LV impairment with most recent echocardiogram showing EF 50 to 55%  · Continue losartan and metoprolol succinate  · Volume appears appropriate. As needed bumetanide    Complete heart block  · Pacemaker dependent  · Device interrogated today    History of ASD status postsurgical closure    History of tricuspid valve repair    Fatigue  · I think this is likely related to all of her chronic medical problems  · She had recent labs without significant findings  · Without improvement when seen for follow-up could consider repeat echocardiogram.  Last echocardiogram was fairly recently in June 2022 and appeared stable    Orders Placed This Encounter   Procedures   • ECG 12 Lead     This order was created via procedure documentation     Order Specific Question:   Release to patient     Answer:   Routine Release            RAHAT Senior  [facility]  03/09/23  15:08 EST

## 2023-04-19 PROCEDURE — 93294 REM INTERROG EVL PM/LDLS PM: CPT | Performed by: INTERNAL MEDICINE

## 2023-04-19 PROCEDURE — 93296 REM INTERROG EVL PM/IDS: CPT | Performed by: INTERNAL MEDICINE

## 2023-05-02 ENCOUNTER — OFFICE VISIT (OUTPATIENT)
Dept: CARDIOLOGY | Facility: CLINIC | Age: 58
End: 2023-05-02
Payer: OTHER GOVERNMENT

## 2023-05-02 VITALS
SYSTOLIC BLOOD PRESSURE: 130 MMHG | HEIGHT: 65 IN | WEIGHT: 201 LBS | HEART RATE: 61 BPM | DIASTOLIC BLOOD PRESSURE: 84 MMHG | BODY MASS INDEX: 33.49 KG/M2

## 2023-05-02 DIAGNOSIS — I42.8 NONISCHEMIC CARDIOMYOPATHY: Primary | ICD-10-CM

## 2023-05-02 DIAGNOSIS — Z95.0 PRESENCE OF CARDIAC PACEMAKER: ICD-10-CM

## 2023-05-02 PROCEDURE — 99214 OFFICE O/P EST MOD 30 MIN: CPT | Performed by: INTERNAL MEDICINE

## 2023-05-02 PROCEDURE — 93000 ELECTROCARDIOGRAM COMPLETE: CPT | Performed by: INTERNAL MEDICINE

## 2023-05-02 NOTE — PROGRESS NOTES
Date of Office Visit: 2023  Encounter Provider: Yennifer Viera MD  Place of Service: [facility] CARDIOLOGY  Patient Name: Whitney Schofield  :1965      Patient ID:  Whitney Schofield is a 57 y.o. female is here for  followup for biventricular pacing with history of nonischemic cardiomyopathy.        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    The patient  originally presented to [facility] with symptoms of palpitations and  shortness of breath in 2009. She subsequently was diagnosed with Hashimoto  thyroiditis, and underwent a partial thyroidectomy in 2010. As part of a routine  follow-up, she had a two-dimensional echocardiogram performed on 3/1/2010 which   revealed a severely dilated right ventricle and significant pulmonary hypertension. A CT   scan of her chest did not show any evidence for pulmonary embolus or other acute lung   pathology.  She underwent a transesophageal echocardiogram on 3/16/2010 which   revealed a large secundum ASD high in the atrial septum. 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 She was referred to   Dr. Harper for evaluation to surgically repair the ASD, and underwent a preoperative   cardiac catheterization on 3/26/2010. This revealed angiographically normal coronary   arteries, with a Qp:Qs of 2.3, consistent with a large shunt. The patient underwent   closure of the ASD, as well as tricuspid valve repair with an  "annuloplasty ring by Dr. Harper at [facility] on 05/06/2010. Her postoperative period was complicated by   complete AV block and accelerated junctional escape rhythm. She underwent   placement of a permanent pacemaker on 5/11/2010.      The patient continued to have significant shortness of breath and a 24-hour Holter   monitor and two-dimensional echocardiogram were obtained on 5/12/2011. Her   Holter monitor did show several brief episodes of tachycardia. The echocardiogram   did not show any significant findings other than a \"shuttering motion\" of the septum.   She underwent a right and left heart catheterization on 7/20/2011 to assess for any   evidence of constriction. There was no evidence of restriction or constriction on the   heart catheterization, although she did have pulmonary hypertension with a mean   pulmonary artery pressure of 30 mmHg. A transesophageal echocardiogram was also   performed on 6/20/2011 which revealed an ejection fraction of 55-60%, and an intact   ASD repair. An electrophysiology study was performed by Dr. Foster on 8/1/2011.   This was performed through her pacemaker in conjunction with an isoproterenol   infusion. This showed no inducible tachycardia or ventricular tachycardia.       The patient eventually wore a 21-day event recorder and was noted to go into   wide-complex tachycardia on 9/30/2012. She had multiple other episodes as well.   Dr. Aguilar from Electrophysiology saw the patient and felt this represented ventricular   tachycardia. She did ultimately undergo a repeat electrophysiology study on   11/20/2012 by Dr. Aguilar which confirmed complete infra-hisian block with no   conduction retrograde or antegrade. No ventricular tachycardia was induced. She   was started on beta blockers at that time.       At the patient's visit in 3/2013 she complained of severe volume retention and edema.    She was also more short of breath and had gained a significant amount of weight. "   She was admitted to the hospital on 3/25/2013 and underwent a transesophageal   echocardiogram.  Her ejection fraction had decreased significantly to 35%, and there   was an extensive amount of thrombus formation on her pacemaker wires. Her ASD   repair appeared to be in good condition. She did undergo a CT scan of the chest   which showed no evidence for pulmonary embolism, as well as a CT scan of the   abdomen which showed no evidence for inferior vena cava obstruction or clotting.   She was diuresed extensively and placed on Coumadin at that time. 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 She ultimately underwent left   ventricular lead placement and pacemaker replacement on 8/19/2013 by Dr. Aguilar.   She then had right atrial lead dislodgement and diaphragmatic stimulation. A right   atrial lead revision was performed on 2/19/2014; however, the patient developed a   significant left subclavian vein thrombosis post procedurally. She again was placed   on Coumadin at that time, which was then switched to Xarelto. She ultimately   underwent laser lead extraction of the abandoned right atrial lead on 4/17/2014. She   did undergo a right heart cath on 4/13/2015 which showed only mild pulmonary   hypertension (mean PA pressure 29 mm Hg).    She is , has 2 children, works as a , has never smoked, uses no alcohol and has 1 cup of coffee per day.      Labs on 3/1/2023 show normal creatinine, normal ALT and AST, C-reactive protein elevated at 4.2, ESR high at 73, hemoglobin 11.4, otherwise normal CBC.  Labs done 11/30/2022 show thyroglobulin antibody elevated at 154.5 with normal free T4 and TSH low 0.13, triglycerides 85, total cholesterol 162, HDL 41, , VLDL 17, normal CMP.  Echo done 6/20/2022 shows ejection fraction of 51-55% with septal wall motion abnormality consistent with RV pacing, grade 1 diastolic dysfunction, mildly dilated left atrium with mild tricuspid  insufficiency, no pericardial effusion and normal RVSP.  She had a nonischemic stress nuclear perfusion study done 10/10/2018.  She had normal bilateral lower extremity venous duplex study done 1/26/2022.      She had pacemaker interrogation done 4/6/2023 which showed 6.2 years of battery left, normal device function, atrial pacing/biventricular pacing at 60 bpm with 99.9% RV pacing, 11.7 atrial pacing, 99.9 LV pacing, 99.9 CRT pacing, no atrial fibrillation or atrial tachycardia.    She does not have a lot of palpitations.  Her blood pressure is under good control.  She has had no dizziness, syncope or falls.  She has no orthopnea or PND.  She does continue to work because she enjoys working.  She has no orthopnea or PND.  She has no exertional chest tightness or pressure.  She has no exertional dyspnea.  She has mild chronic lower extremity swelling with chronic venous insufficiency.  She is taking her medications as directed without difficulty.    Past Medical History:   Diagnosis Date   • Anemia    • ASD (atrial septal defect)    • Asthma    • Asystole     Post ASD closure asystole and high-grade AV block.  s/p pacemaker 5/11/10.   • Celiac disease    • DVT (deep venous thrombosis)     Left subclavian DVT following right atrial pacemaker lead revision in 2/14   • Edema    • Hashimoto's thyroiditis     s/p partial thyroidectomy 1/13/10   • Heart palpitations    • Hx of thyroid cancer    • Malignant neoplasm of thyroid gland     Papillary carcinoma of the thyroid - s/p residual thyroidectomy in 2/11.   • Migraine    • Nonischemic cardiomyopathy     EF as low as 35% 3/13.  EF 6/21/13 by CHANDANA was 50-55%, and 53% by echo on 6/17/14.  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  EF 50% by CHANDANA on 4/18/16.   • NSVT (nonsustained ventricular tachycardia)     Noted by event recorder 9/12.  EP study by Dr. Aguilar on 11/20/12 showed no inducible VT.   • LUKAS on CPAP    • Papillary thyroid carcinoma 08/27/2013   •  Rheumatoid arthritis     Severe and debilitating    • Secundum ASD     s/p open closure with a PeriPatch xenograft by Dr. Harper on 5/6/10 by Dr. Harper at [facility].   • Sleep apnea    • SOB (shortness of breath)     Multifactorial    • Thrombus due to any device, implant or graft     Extensive thrombus formation on the pacemaker leads by CHANDANA 3/25/13.  Initiated on Coumadin transiently at that time - improved significantly by CHANDANA 6/21/13.         Past Surgical History:   Procedure Laterality Date   • ASD AND VSD REPAIR     • CARDIAC ELECTROPHYSIOLOGY PROCEDURE N/A 1/18/2021    Procedure: Generator Change BI Ventriculat PPM- Medtronic;  Surgeon: Jose Mcdowell MD;  Location: [facility] CATH INVASIVE LOCATION;  Service: Cardiology;  Laterality: N/A;   • CHOLECYSTECTOMY  2007   • ENDOSCOPY AND COLONOSCOPY  12/30/2015    Tom Orta MD   • EXCISION MASS TRUNK Right 7/20/2018    Procedure: EXCISION MASS TRUNK, RUQ ABD WALL LIPOMA;  Surgeon: Eduar Marx MD;  Location: [facility] OR [facility];  Service: General   • PACEMAKER IMPLANTATION  05/2010    Inital dual-chamber pacemaker placed 5/11/10 after ASD closure surgery.  Had LV lead placement and generator change on 8/19/13 by Dr. Aguilar (Medtronic #[id number]).  Then had right atrial lead revision on 2/19/14 (for right atrial lead dislodgement and diaphragmatic stimulation) - developed left subclavian DVT afterwards.  Then had laser lead extraction of the abandoned right atrial lead on 4/17/14.   • REPLACEMENT TOTAL KNEE BILATERAL     • SHOULDER SURGERY Right    • THYROID LOBECTOMY Right 2012   • THYROIDECTOMY Left 2011    Dr. Coronado, radioactive iodine X3   • TONSILLECTOMY     • TRICUSPID VALVE SURGERY      #32 MC3 annuloplasty ring by Dr. Harper on 5/6/10 (at time of ASD closure)       Current Outpatient Medications on File Prior to Visit   Medication Sig Dispense Refill   • albuterol sulfate  (90 Base) MCG/ACT inhaler Inhale 2 puffs Every 4 (Four) Hours As  Needed for Wheezing. 2 puffs every 4 hours when necessary dyspnea 1 inhaler 1   • bumetanide (BUMEX) 0.5 MG tablet Take 2 tablets by mouth Daily As Needed (swelling). 90 tablet 1   • cloNIDine (CATAPRES) 0.2 MG tablet Take 1 tablet by mouth 2 (Two) Times a Day. Take blood twice a day as needed for blood pressure over 180/90. 14 tablet 0   • Ferrous Sulfate Dried 200 (65 FE) MG tablet Take 1 tablet by mouth Daily.     • fluticasone-salmeterol (ADVAIR HFA) 115-21 MCG/ACT inhaler Inhale 2 puffs 2 (Two) Times a Day. 1 inhaler 0   • folic acid (FOLVITE) 1 MG tablet Take 1 tablet by mouth Daily.     • HYDROcodone-acetaminophen (NORCO)  MG per tablet Take 1 tablet by mouth Every 6 (Six) Hours As Needed.     • ipratropium-albuterol (DUO-NEB) 0.5-2.5 mg/3 ml nebulizer Take 3 mL by nebulization Every 4 (Four) Hours As Needed for Wheezing or Shortness of Air. 360 mL 0   • leflunomide (ARAVA) 20 MG tablet      • Levoxyl 175 MCG tablet      • losartan (COZAAR) 25 MG tablet Take 1 tablet by mouth Daily As Needed.     • methotrexate 2.5 MG tablet Take 8 tablets by mouth 1 (One) Time Per Week.     • metoprolol succinate XL (TOPROL-XL) 25 MG 24 hr tablet Take 0.5 tablets by mouth Daily. 45 tablet 3   • omeprazole (priLOSEC) 40 MG capsule Take 1 capsule by mouth Daily.     • pantoprazole (PROTONIX) 40 MG EC tablet Take 1 tablet by mouth Daily.     • vitamin D (ERGOCALCIFEROL) 08345 units capsule capsule Take 1 capsule by mouth 3 (Three) Times a Week.     • [DISCONTINUED] amoxicillin-clavulanate (Augmentin) 875-125 MG per tablet Take 1 tablet by mouth 2 (Two) Times a Day. 14 tablet 0   • [DISCONTINUED] levothyroxine (Synthroid) 150 MCG tablet Take 1 tablet by mouth Daily. 30 tablet 1   • [DISCONTINUED] methylPREDNISolone (MEDROL) 4 MG dose pack Take as directed on package instructions. 21 tablet 0     No current facility-administered medications on file prior to visit.       Social History     Socioeconomic History   • Marital  "status:    • Number of children: 2   Tobacco Use   • Smoking status: Never   • Smokeless tobacco: Never   Substance and Sexual Activity   • Alcohol use: No   • Drug use: No   • Sexual activity: Yes     Partners: Male           ROS    Procedures    ECG 12 Lead    Date/Time: 5/2/2023 9:09 AM  Performed by: Yennifer Viera MD  Authorized by: Yennifer Viera MD   Comparison: compared with previous ECG   Similar to previous ECG  Rhythm: sinus rhythm  Pacing: atrial sensed rhythm and ventricular paced rhythm  Clinical impression: abnormal EKG                Objective:      Vitals:    05/02/23 0840   BP: 130/84   Pulse: 61   Weight: 91.2 kg (201 lb)   Height: 165.1 cm (65\")     Body mass index is 33.45 kg/m².    Vitals reviewed.   Constitutional:       General: Not in acute distress.     Appearance: Well-developed. Not diaphoretic.   Eyes:      General: No scleral icterus.     Conjunctiva/sclera: Conjunctivae normal.   HENT:      Head: Normocephalic and atraumatic.   Neck:      Thyroid: No thyromegaly.      Vascular: No carotid bruit or JVD.      Lymphadenopathy: No cervical adenopathy.   Pulmonary:      Effort: Pulmonary effort is normal. No respiratory distress.      Breath sounds: Normal breath sounds. No wheezing. No rhonchi. No rales.   Chest:      Chest wall: Not tender to palpatation.   Cardiovascular:      Normal rate. Regular rhythm.      Murmurs: There is no murmur.      No gallop.   Pulses:     Intact distal pulses.   Edema:     Peripheral edema absent.   Abdominal:      General: Bowel sounds are normal. There is no distension or abdominal bruit.      Palpations: Abdomen is soft. There is no abdominal mass.      Tenderness: There is no abdominal tenderness.   Musculoskeletal:         General: No deformity.      Extremities: No clubbing present.     Cervical back: Neck supple. Skin:     General: Skin is warm and dry. There is no cyanosis.      Coloration: Skin is not pale.      Findings: No " rash.   Neurological:      Mental Status: Alert and oriented to person, place, and time.      Cranial Nerves: No cranial nerve deficit.   Psychiatric:         Judgment: Judgment normal.         Lab Review:       Assessment:      Diagnosis Plan   1. Nonischemic cardiomyopathy        2. Presence of cardiac pacemaker          1. Large secundum atrial septal defect status post patch repair in 2010  2. Tricuspid valve insufficiency status post tricuspid repair with annuloplasty ring in 2010  3. Pacemaker placed in 2010 due to complete AV block.  4. History of nonischemic cardiomyopathy due to chronic right ventricular pacing, resolved with upgrade to CRT pacing  5. Rheumatoid arthritis, sees Dr. Ozuna.   6. Hashimoto's thyroiditis and history of thyroid cancer, follows with Dr. Bravo.   7. Palpitations due to PVCs in the past.     Plan:       No medication changes, no other testing at this time, see Kimi in 6 months.  Her inflammatory markers are high and she is getting medication adjustments done with her rheumatologist.  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  Her palpitations though have been controlled thankfully.

## 2023-07-19 PROCEDURE — 93296 REM INTERROG EVL PM/IDS: CPT | Performed by: INTERNAL MEDICINE

## 2023-07-19 PROCEDURE — 93294 REM INTERROG EVL PM/LDLS PM: CPT | Performed by: INTERNAL MEDICINE

## 2023-08-07 ENCOUNTER — OFFICE VISIT (OUTPATIENT)
Dept: FAMILY MEDICINE CLINIC | Facility: CLINIC | Age: 58
End: 2023-08-07
Payer: OTHER GOVERNMENT

## 2023-08-07 VITALS
DIASTOLIC BLOOD PRESSURE: 80 MMHG | TEMPERATURE: 97.8 F | WEIGHT: 198.8 LBS | HEART RATE: 73 BPM | BODY MASS INDEX: 33.12 KG/M2 | SYSTOLIC BLOOD PRESSURE: 130 MMHG | HEIGHT: 65 IN | OXYGEN SATURATION: 98 %

## 2023-08-07 DIAGNOSIS — I10 PRIMARY HYPERTENSION: Primary | ICD-10-CM

## 2023-08-07 DIAGNOSIS — E66.09 CLASS 1 OBESITY DUE TO EXCESS CALORIES WITH SERIOUS COMORBIDITY AND BODY MASS INDEX (BMI) OF 33.0 TO 33.9 IN ADULT: ICD-10-CM

## 2023-08-07 DIAGNOSIS — Z12.11 ENCOUNTER FOR SCREENING COLONOSCOPY: ICD-10-CM

## 2023-08-07 DIAGNOSIS — Z95.0 PRESENCE OF CARDIAC PACEMAKER: ICD-10-CM

## 2023-08-07 DIAGNOSIS — Z12.31 ENCOUNTER FOR SCREENING MAMMOGRAM FOR MALIGNANT NEOPLASM OF BREAST: ICD-10-CM

## 2023-08-07 PROBLEM — E66.9 OBESITY (BMI 30.0-34.9): Status: ACTIVE | Noted: 2023-08-07

## 2023-08-07 PROBLEM — E66.811 OBESITY (BMI 30.0-34.9): Status: ACTIVE | Noted: 2023-08-07

## 2023-08-07 PROCEDURE — 99213 OFFICE O/P EST LOW 20 MIN: CPT | Performed by: NURSE PRACTITIONER

## 2023-08-07 RX ORDER — LEVOTHYROXINE SODIUM 175 UG/1
175 TABLET ORAL DAILY
COMMUNITY

## 2023-08-07 NOTE — PROGRESS NOTES
"Chief Complaint  Establish Care (Not Fasting/Needs referral for Colonoscopy and mammogram//)    Subjective        Whitney Schofield presents to [facility]  History of Present Illness  Patient presents to the office today to establish care. Blood pressure today is 130/80. She is due for colonoscopy and mammogram.     Objective   Vital Signs:  /80 (BP Location: Left arm, Patient Position: Sitting, Cuff Size: Large Adult)   Pulse 73   Temp 97.8 øF (36.6 øC)   Ht 165.1 cm (65\")   Wt 90.2 kg (198 lb 12.8 oz)   SpO2 98%   BMI 33.08 kg/mý   Estimated body mass index is 33.08 kg/mý as calculated from the following:    Height as of this encounter: 165.1 cm (65\").    Weight as of this encounter: 90.2 kg (198 lb 12.8 oz).             Physical Exam  Constitutional:       General: She is not in acute distress.     Appearance: Normal appearance.   HENT:      Head: Normocephalic.   Eyes:      Pupils: Pupils are equal, round, and reactive to light.   Cardiovascular:      Rate and Rhythm: Normal rate.      Pulses: Normal pulses.      Heart sounds: Normal heart sounds.   Pulmonary:      Effort: Pulmonary effort is normal.      Breath sounds: Normal breath sounds.   Abdominal:      General: Bowel sounds are normal.      Palpations: Abdomen is soft.   Musculoskeletal:         General: Normal range of motion.      Cervical back: Normal range of motion and neck supple.   Skin:     General: Skin is warm.   Neurological:      General: No focal deficit present.      Mental Status: She is alert and oriented to person, place, and time.   Psychiatric:         Mood and Affect: Mood normal.         Behavior: Behavior normal.         Thought Content: Thought content normal.         Judgment: Judgment normal.      Result Review :                   Assessment and Plan   Diagnoses and all orders for this visit:    1. Primary hypertension (Primary)    2. Encounter for screening colonoscopy  -     Ambulatory " Referral to Gastroenterology    3. Encounter for screening mammogram for malignant neoplasm of breast  -     Mammo Screening Digital Tomosynthesis Bilateral With CAD    4. Class 1 obesity due to excess calories with serious comorbidity and body mass index (BMI) of 33.0 to 33.9 in adult    5. Presence of cardiac pacemaker      HTN stable takes medication as directed , dash diet        Follow Up   No follow-ups on file.  Patient was given instructions and counseling regarding her condition or for health maintenance advice. Please see specific information pulled into the AVS if appropriate.

## 2023-08-09 PROBLEM — E66.811 CLASS 1 OBESITY DUE TO EXCESS CALORIES WITH SERIOUS COMORBIDITY AND BODY MASS INDEX (BMI) OF 33.0 TO 33.9 IN ADULT: Status: ACTIVE | Noted: 2023-08-09

## 2023-08-09 PROBLEM — E66.09 CLASS 1 OBESITY DUE TO EXCESS CALORIES WITH SERIOUS COMORBIDITY AND BODY MASS INDEX (BMI) OF 33.0 TO 33.9 IN ADULT: Status: ACTIVE | Noted: 2023-08-09

## 2023-08-09 PROBLEM — I10 PRIMARY HYPERTENSION: Status: ACTIVE | Noted: 2023-08-09

## 2023-08-09 PROBLEM — Z12.31 ENCOUNTER FOR SCREENING MAMMOGRAM FOR MALIGNANT NEOPLASM OF BREAST: Status: ACTIVE | Noted: 2023-08-07

## 2023-09-12 ENCOUNTER — CLINICAL SUPPORT NO REQUIREMENTS (OUTPATIENT)
Dept: CARDIOLOGY | Facility: CLINIC | Age: 58
End: 2023-09-12
Payer: OTHER GOVERNMENT

## 2023-09-12 DIAGNOSIS — I42.8 NONISCHEMIC CARDIOMYOPATHY: Primary | ICD-10-CM

## 2023-09-13 DIAGNOSIS — M06.9 RHEUMATOID ARTHRITIS, INVOLVING UNSPECIFIED SITE, UNSPECIFIED WHETHER RHEUMATOID FACTOR PRESENT: ICD-10-CM

## 2023-09-13 DIAGNOSIS — E06.3 HASHIMOTO'S THYROIDITIS: Primary | ICD-10-CM

## 2023-09-26 DIAGNOSIS — Z95.0 PRESENCE OF CARDIAC PACEMAKER: Primary | ICD-10-CM

## 2023-11-08 DIAGNOSIS — Z12.11 COLON CANCER SCREENING: Primary | ICD-10-CM

## 2023-11-09 ENCOUNTER — OFFICE VISIT (OUTPATIENT)
Dept: CARDIOLOGY | Facility: CLINIC | Age: 58
End: 2023-11-09
Payer: OTHER GOVERNMENT

## 2023-11-09 ENCOUNTER — CLINICAL SUPPORT NO REQUIREMENTS (OUTPATIENT)
Age: 58
End: 2023-11-09
Payer: OTHER GOVERNMENT

## 2023-11-09 ENCOUNTER — OFFICE VISIT (OUTPATIENT)
Age: 58
End: 2023-11-09
Payer: OTHER GOVERNMENT

## 2023-11-09 VITALS
WEIGHT: 202 LBS | HEART RATE: 83 BPM | HEIGHT: 65 IN | DIASTOLIC BLOOD PRESSURE: 92 MMHG | SYSTOLIC BLOOD PRESSURE: 144 MMHG | BODY MASS INDEX: 33.66 KG/M2

## 2023-11-09 VITALS
DIASTOLIC BLOOD PRESSURE: 92 MMHG | SYSTOLIC BLOOD PRESSURE: 144 MMHG | HEART RATE: 83 BPM | BODY MASS INDEX: 33.66 KG/M2 | WEIGHT: 202 LBS | HEIGHT: 65 IN

## 2023-11-09 DIAGNOSIS — Z95.0 PRESENCE OF CARDIAC PACEMAKER: ICD-10-CM

## 2023-11-09 DIAGNOSIS — I10 PRIMARY HYPERTENSION: ICD-10-CM

## 2023-11-09 DIAGNOSIS — I42.8 NONISCHEMIC CARDIOMYOPATHY: Primary | ICD-10-CM

## 2023-11-09 PROCEDURE — 99214 OFFICE O/P EST MOD 30 MIN: CPT | Performed by: INTERNAL MEDICINE

## 2023-11-09 RX ORDER — METOPROLOL SUCCINATE 25 MG/1
25 TABLET, EXTENDED RELEASE ORAL DAILY
Qty: 90 TABLET | Refills: 3 | Status: SHIPPED | OUTPATIENT
Start: 2023-11-09

## 2023-11-09 RX ORDER — TOCILIZUMAB 180 MG/ML
INJECTION, SOLUTION SUBCUTANEOUS
COMMUNITY
Start: 2023-09-18

## 2023-11-09 NOTE — PROGRESS NOTES
Date of Office Visit: 2023  Encounter Provider: Yennifer Viera MD  Place of Service: [facility] CARDIOLOGY  Patient Name: Whitney Schofield  :1965      Patient ID:  Whitney Schofield is a 57 y.o. 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     The patient  originally presented to [facility] with symptoms of palpitations and  shortness of breath in 2009. She subsequently was diagnosed with Hashimoto  thyroiditis, and underwent a partial thyroidectomy in 2010. As part of a routine  follow-up, she had a two-dimensional echocardiogram performed on 3/1/2010 which   revealed a severely dilated right ventricle and significant pulmonary hypertension. A CT   scan of her chest did not show any evidence for pulmonary embolus or other acute lung   pathology.  She underwent a transesophageal echocardiogram on 3/16/2010 which   revealed a large secundum ASD high in the atrial septum. 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 She was referred to   Dr. Harper for evaluation to surgically repair the ASD, and underwent a preoperative   cardiac catheterization on 3/26/2010. This revealed angiographically normal coronary   arteries, with a Qp:Qs of 2.3, consistent with a large shunt. The patient underwent   closure of the ASD, as well as tricuspid valve repair with an annuloplasty ring by Dr. Harper at [facility] on 2010. Her  "postoperative period was complicated by   complete AV block and accelerated junctional escape rhythm. She underwent   placement of a permanent pacemaker on 5/11/2010.      The patient continued to have significant shortness of breath and a 24-hour Holter   monitor and two-dimensional echocardiogram were obtained on 5/12/2011. Her   Holter monitor did show several brief episodes of tachycardia. The echocardiogram   did not show any significant findings other than a \"shuttering motion\" of the septum.   She underwent a right and left heart catheterization on 7/20/2011 to assess for any   evidence of constriction. There was no evidence of restriction or constriction on the   heart catheterization, although she did have pulmonary hypertension with a mean   pulmonary artery pressure of 30 mmHg. A transesophageal echocardiogram was also   performed on 6/20/2011 which revealed an ejection fraction of 55-60%, and an intact   ASD repair. An electrophysiology study was performed by Dr. Foster on 8/1/2011.   This was performed through her pacemaker in conjunction with an isoproterenol   infusion. This showed no inducible tachycardia or ventricular tachycardia.       The patient eventually wore a 21-day event recorder and was noted to go into   wide-complex tachycardia on 9/30/2012. She had multiple other episodes as well.   Dr. Aguilar from Electrophysiology saw the patient and felt this represented ventricular   tachycardia. She did ultimately undergo a repeat electrophysiology study on   11/20/2012 by Dr. Aguilar which confirmed complete infra-hisian block with no   conduction retrograde or antegrade. No ventricular tachycardia was induced. She   was started on beta blockers at that time.       At the patient's visit in 3/2013 she complained of severe volume retention and edema.    She was also more short of breath and had gained a significant amount of weight.   She was admitted to the hospital on 3/25/2013 and underwent a " transesophageal   echocardiogram.  Her ejection fraction had decreased significantly to 35%, and there   was an extensive amount of thrombus formation on her pacemaker wires. Her ASD   repair appeared to be in good condition. She did undergo a CT scan of the chest   which showed no evidence for pulmonary embolism, as well as a CT scan of the   abdomen which showed no evidence for inferior vena cava obstruction or clotting.   She was diuresed extensively and placed on Coumadin at that time. 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 She ultimately underwent left   ventricular lead placement and pacemaker replacement on 8/19/2013 by Dr. Aguilar.   She then had right atrial lead dislodgement and diaphragmatic stimulation. A right   atrial lead revision was performed on 2/19/2014; however, the patient developed a   significant left subclavian vein thrombosis post procedurally. She again was placed   on Coumadin at that time, which was then switched to Xarelto. She ultimately   underwent laser lead extraction of the abandoned right atrial lead on 4/17/2014. She   did undergo a right heart cath on 4/13/2015 which showed only mild pulmonary   hypertension (mean PA pressure 29 mm Hg).     She is , has 2 children, works as a , has never smoked, uses no alcohol and has 1 cup of coffee per day.      Echo done 6/20/2022 shows ejection fraction of 51-55% with septal wall motion abnormality consistent with RV pacing, grade 1 diastolic dysfunction, mildly dilated left atrium with mild tricuspid insufficiency, no pericardial effusion and normal RVSP.  She had a nonischemic stress nuclear perfusion study done 10/10/2018.  She had normal bilateral lower extremity venous duplex study done 1/26/2022.      Labs done 11/1/2023 show talk screen opiate positive, normal creatinine, normal ALT and AST, normal CRP, sedimentation rate 27, normal CBC, CRP elevated at 4.7, 0.09 TSH, free T4 high at 1.52,  triglycerides 72, total cholesterol 148, HDL 41, LDL 93, normal CMP.Device interrogation done 10/5/2023 shows normal device function, no changes made, 8 years of battery left, 99.5% CRT pacing with 4.3% atrial pacing.    She has had more tachycardia and palpitations but no dizziness or syncope.  She has no orthopnea or PND.  She has no chest pain, no exertional dyspnea.  She is taking her medications as directed.    Past Medical History:   Diagnosis Date    Anemia     ASD (atrial septal defect)     Asthma     Asystole     Post ASD closure asystole and high-grade AV block.  s/p pacemaker 5/11/10.    Celiac disease     DVT (deep venous thrombosis)     Left subclavian DVT following right atrial pacemaker lead revision in 2/14    Edema     Hashimoto's thyroiditis     s/p partial thyroidectomy 1/13/10    Heart palpitations     Hx of thyroid cancer     Malignant neoplasm of thyroid gland     Papillary carcinoma of the thyroid - s/p residual thyroidectomy in 2/11.    Migraine     Nonischemic cardiomyopathy     EF as low as 35% 3/13.  EF 6/21/13 by CHANDANA was 50-55%, and 53% by echo on 6/17/14.  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  EF 50% by CHANDANA on 4/18/16.    NSVT (nonsustained ventricular tachycardia)     Noted by event recorder 9/12.  EP study by Dr. Aguilar on 11/20/12 showed no inducible VT.    LUKAS on CPAP     Papillary thyroid carcinoma 08/27/2013    Rheumatoid arthritis     Severe and debilitating     Secundum ASD     s/p open closure with a PeriPatch xenograft by Dr. Harper on 5/6/10 by Dr. Harper at [facility].    Sleep apnea     SOB (shortness of breath)     Multifactorial     Thrombus due to any device, implant or graft     Extensive thrombus formation on the pacemaker leads by CHANDANA 3/25/13.  Initiated on Coumadin transiently at that time - improved significantly by CHANDANA 6/21/13.         Past Surgical History:   Procedure Laterality Date    ASD AND VSD REPAIR      CARDIAC ELECTROPHYSIOLOGY  PROCEDURE N/A 1/18/2021    Procedure: Generator Change BI Ventriculat PPM- Medtronic;  Surgeon: Jose Mcdowell MD;  Location: [facility] CATH INVASIVE LOCATION;  Service: Cardiology;  Laterality: N/A;    CHOLECYSTECTOMY  2007    ENDOSCOPY AND COLONOSCOPY  12/30/2015    Tom Orta MD    EXCISION MASS TRUNK Right 7/20/2018    Procedure: EXCISION MASS TRUNK, RUQ ABD WALL LIPOMA;  Surgeon: Eduar Marx MD;  Location: [facility] OR OSC;  Service: General    PACEMAKER IMPLANTATION  05/2010    Inital dual-chamber pacemaker placed 5/11/10 after ASD closure surgery.  Had LV lead placement and generator change on 8/19/13 by Dr. Aguilar (Medtronic #[id number]).  Then had right atrial lead revision on 2/19/14 (for right atrial lead dislodgement and diaphragmatic stimulation) - developed left subclavian DVT afterwards.  Then had laser lead extraction of the abandoned right atrial lead on 4/17/14.    REPLACEMENT TOTAL KNEE BILATERAL      SHOULDER SURGERY Right     THYROID LOBECTOMY Right 2012    THYROIDECTOMY Left 2011    Dr. Coronado, radioactive iodine X3    TONSILLECTOMY      TRICUSPID VALVE SURGERY      #32 MC3 annuloplasty ring by Dr. Harper on 5/6/10 (at time of ASD closure)       Current Outpatient Medications on File Prior to Visit   Medication Sig Dispense Refill    bumetanide (BUMEX) 0.5 MG tablet Take 2 tablets by mouth Daily As Needed (swelling). 90 tablet 1    cloNIDine (CATAPRES) 0.2 MG tablet Take 1 tablet by mouth 2 (Two) Times a Day. Take blood twice a day as needed for blood pressure over 180/90. 14 tablet 0    Ferrous Sulfate Dried 200 (65 FE) MG tablet Take 1 tablet by mouth Daily.      folic acid (FOLVITE) 1 MG tablet Take 1 tablet by mouth Daily.      HYDROcodone-acetaminophen (NORCO)  MG per tablet Take 1 tablet by mouth Every 6 (Six) Hours As Needed.      ipratropium-albuterol (DUO-NEB) 0.5-2.5 mg/3 ml nebulizer Take 3 mL by nebulization Every 4 (Four) Hours As Needed for Wheezing or Shortness of Air.  "360 mL 0    leflunomide (ARAVA) 20 MG tablet Take 1 tablet by mouth Daily.      levothyroxine (SYNTHROID, LEVOTHROID) 175 MCG tablet Take 1 tablet by mouth Daily.      losartan (COZAAR) 25 MG tablet Take 1 tablet by mouth Daily As Needed.      methotrexate 2.5 MG tablet Take 8 tablets by mouth 1 (One) Time Per Week.      metoprolol succinate XL (TOPROL-XL) 25 MG 24 hr tablet Take 0.5 tablets by mouth Daily. 45 tablet 3    omeprazole (priLOSEC) 40 MG capsule Take 1 capsule by mouth Daily.      pantoprazole (PROTONIX) 40 MG EC tablet Take 1 tablet by mouth Daily.      vitamin D (ERGOCALCIFEROL) 58074 units capsule capsule Take 1 capsule by mouth 3 (Three) Times a Week.       No current facility-administered medications on file prior to visit.       Social History     Socioeconomic History    Marital status:     Number of children: 2   Tobacco Use    Smoking status: Never     Passive exposure: Never    Smokeless tobacco: Never   Vaping Use    Vaping Use: Never used   Substance and Sexual Activity    Alcohol use: No    Drug use: No    Sexual activity: Yes     Partners: Male           ROS    Procedures  Procedures        Objective:      Vitals:    11/09/23 1449   BP: 144/92   Pulse: 83   Weight: 91.6 kg (202 lb)   Height: 165.1 cm (65\")     Body mass index is 33.61 kg/m².    Vitals reviewed.   Constitutional:       General: Not in acute distress.     Appearance: Well-developed. Not diaphoretic.   Eyes:      General: No scleral icterus.     Conjunctiva/sclera: Conjunctivae normal.   HENT:      Head: Normocephalic and atraumatic.   Neck:      Thyroid: No thyromegaly.      Vascular: No carotid bruit or JVD.      Lymphadenopathy: No cervical adenopathy.   Pulmonary:      Effort: Pulmonary effort is normal. No respiratory distress.      Breath sounds: Normal breath sounds. No wheezing. No rhonchi. No rales.   Chest:      Chest wall: Not tender to palpatation.   Cardiovascular:      Normal rate. Regular rhythm.      " Murmurs: There is no murmur.      No gallop.  No rub.   Pulses:     Intact distal pulses.      Carotid: 2+ bilaterally.     Radial: 2+ bilaterally.     Dorsalis pedis: 2+ bilaterally.     Posterior tibial: 2+ bilaterally.  Edema:     Peripheral edema absent.   Abdominal:      General: Bowel sounds are normal. There is no distension or abdominal bruit.      Palpations: Abdomen is soft. There is no abdominal mass.      Tenderness: There is no abdominal tenderness.   Musculoskeletal:         General: No deformity.      Extremities: No clubbing present.     Cervical back: Neck supple. Skin:     General: Skin is warm and dry. There is no cyanosis.      Coloration: Skin is not pale.      Findings: No rash.   Neurological:      Mental Status: Alert and oriented to person, place, and time.      Cranial Nerves: No cranial nerve deficit.   Psychiatric:         Judgment: Judgment normal.         Lab Review:       Assessment:      Diagnosis Plan   1. Nonischemic cardiomyopathy        2. Presence of cardiac pacemaker        3. Primary hypertension          Large secundum atrial septal defect status post patch repair in 2010  Tricuspid valve insufficiency status post tricuspid repair with annuloplasty ring in 2010  Pacemaker placed in 2010 due to complete AV block.  History of nonischemic cardiomyopathy due to chronic right ventricular pacing, resolved with upgrade to CRT pacing  Rheumatoid arthritis, sees Dr. Ozuna.   Hashimoto's thyroiditis and history of thyroid cancer, follows with Dr. Bravo.   Palpitations due to PVCs in the past.  She has had increasing palpitations and tachycardia.  She needs to increase her metoprolol and she can take extra half doses of metoprolol as needed for days where she has more palpitations and tachycardia.  Up to a maximum of 3 additional half doses of metoprolol and a day in addition to her 25 mg daily.       Plan:       See adriana in 6 months, take metoprolol 25mg nightly.  Continue prn  losartan and clonidine.

## 2023-11-20 ENCOUNTER — TELEPHONE (OUTPATIENT)
Age: 58
End: 2023-11-20
Payer: OTHER GOVERNMENT

## 2023-11-20 ENCOUNTER — PATIENT MESSAGE (OUTPATIENT)
Dept: CARDIOLOGY | Facility: CLINIC | Age: 58
End: 2023-11-20
Payer: OTHER GOVERNMENT

## 2023-11-20 NOTE — TELEPHONE ENCOUNTER
Notified patient of results/recommendations. Patient verbalized understanding.    Arabella Brooks RN  [place] [facility] [place]

## 2023-11-20 NOTE — TELEPHONE ENCOUNTER
This pt with a MDT CRTP device has a new ongoing AF event starting 11/15 (EGM below). She is not on AC. This appears to be new to her.   Kindly,   Meg Schotanus Device RN      \

## 2023-11-20 NOTE — TELEPHONE ENCOUNTER
Notified patient of results/recommendations. Patient verbalized understanding.    Arabella Brooks RN  [place] [facility] [place]

## 2023-11-20 NOTE — TELEPHONE ENCOUNTER
I called and left a message for her to call back regarding the atrial fibrillation.  She needs to be started on Xarelto 20 mg daily with dinner.  She has been on this in the past.  I did not reach her but left a voicemail for her regarding the need for Xarelto as her pacemaker is now showing new atrial fibrillation and her [id number] score gives her a moderate risk of stroke.  I will send in the medication.

## 2024-01-02 ENCOUNTER — TELEPHONE (OUTPATIENT)
Dept: CARDIOLOGY | Facility: CLINIC | Age: 59
End: 2024-01-02
Payer: OTHER GOVERNMENT

## 2024-01-02 NOTE — TELEPHONE ENCOUNTER
Reviewed recommendations with patient, verbalized understanding, will call with any further questions or complaints.  Pt scheduled to see Amara on Friday 1/5.    Jacquelin Law, ANJALI  Triage Nurse  01/02/24 13:51 EST

## 2024-01-02 NOTE — TELEPHONE ENCOUNTER
Triage,  I don't believe that the pulse ox is reading her HR accurately. Please get her scheduled to see a nurse practitioner this week. She was having new episodes of Afib on most recent device interrogation; she may not be tolerating it.     Pacemaker,  Can you also arrange for a device interrogation when she is in office?    Thanks!  RAHAT Saldana

## 2024-01-02 NOTE — TELEPHONE ENCOUNTER
SANDY-  I think they added this patient to your schedule for Friday. She is a [facility] patient with pacemaker and history of PVCs. She had new Afib on device interrogation in November this year and was started on AC.     She reports fatigue. She is coming in for device interrogation. Her beta blocker was increased when she saw RM. I wondering if she is having more Afib and not tolerating it?    Thanks!  RAHAT Saldana

## 2024-01-02 NOTE — TELEPHONE ENCOUNTER
Yes, that's no problem. I added them on for a check half an hr prior to their Amara appt on 1/5 and left them a voicemail to let them know.   Kindly,   Meg Schotanus Device RN

## 2024-01-04 ENCOUNTER — OFFICE VISIT (OUTPATIENT)
Dept: CARDIOLOGY | Facility: CLINIC | Age: 59
End: 2024-01-04
Payer: OTHER GOVERNMENT

## 2024-01-04 ENCOUNTER — CLINICAL SUPPORT NO REQUIREMENTS (OUTPATIENT)
Age: 59
End: 2024-01-04
Payer: OTHER GOVERNMENT

## 2024-01-04 VITALS
HEART RATE: 73 BPM | SYSTOLIC BLOOD PRESSURE: 132 MMHG | DIASTOLIC BLOOD PRESSURE: 80 MMHG | BODY MASS INDEX: 33.45 KG/M2 | WEIGHT: 200.8 LBS | HEIGHT: 65 IN

## 2024-01-04 DIAGNOSIS — G47.33 OSA (OBSTRUCTIVE SLEEP APNEA): ICD-10-CM

## 2024-01-04 DIAGNOSIS — I48.92 ATRIAL FLUTTER WITH CONTROLLED RESPONSE: ICD-10-CM

## 2024-01-04 DIAGNOSIS — I10 PRIMARY HYPERTENSION: ICD-10-CM

## 2024-01-04 DIAGNOSIS — I48.0 PAROXYSMAL ATRIAL FIBRILLATION: ICD-10-CM

## 2024-01-04 DIAGNOSIS — Z95.0 PRESENCE OF CARDIAC PACEMAKER: ICD-10-CM

## 2024-01-04 DIAGNOSIS — I42.8 NONISCHEMIC CARDIOMYOPATHY: Primary | ICD-10-CM

## 2024-01-04 DIAGNOSIS — E06.3 HASHIMOTO'S THYROIDITIS: ICD-10-CM

## 2024-01-04 DIAGNOSIS — I48.0 AF (PAROXYSMAL ATRIAL FIBRILLATION): ICD-10-CM

## 2024-01-04 PROCEDURE — 99214 OFFICE O/P EST MOD 30 MIN: CPT | Performed by: NURSE PRACTITIONER

## 2024-01-04 PROCEDURE — 93000 ELECTROCARDIOGRAM COMPLETE: CPT | Performed by: NURSE PRACTITIONER

## 2024-01-04 NOTE — PROGRESS NOTES
Date of Office Visit: 2024  Encounter Provider: RAHAT Sheppard  Place of Service: [facility] CARDIOLOGY  Patient Name: Whitney Schofield  :1965    No chief complaint on file.  : palpitations    HPI: Whitney Schofield is a 58 y.o. female who is a patient basely following with Dr. Marques and now follows with Dr. Viera.  She has a complex medical history including history of atrial septal defect status post patch repair in , tricuspid valve insufficiency status post tricuspid valve repair in , complete AV block postoperatively status post pacemaker placement in , obstructive sleep apnea, history of anemia, asthma, rheumatoid arthritis ([place]).  She also has history of nonischemic cardiomyopathy due to chronic right ventricular pacing, resolved with upgrade to CRT pacing (generator change ).  In addition, she has history of Hashimoto's thyroiditis and thyroid cancer for which she follows with Dr. Jacobs.      In , patient complained of severe volume retention and anemia.  CHANDANA showed EF had significantly decreased to 35% and there was an extensive amount of thrombus formation on her pacemaker wires.  No evidence of PE on CT scan.  She was placed on Coumadin and then eventually transition to Xarelto.  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 Noel.  Right heart cath  showed mild pulmonary hypertension.    Echocardiogram 2022 showed ejection fraction 51 to 55% with septal wall motion abnormality consistent with RV pacing, grade 1 diastolic dysfunction, normal RVSP.  She had a nonischemic stress nuclear perfusion study 10/10/2018.  Normal bilateral lower extremity venous duplex study on 2022.    She was seen by Dr. Viera on 2023 with complaints of tachycardia and palpitations but no dizziness or  syncope.  Her metoprolol was increased and it was recommended that she take a half dose of metoprolol as needed for days that she has more palpitations and tachycardia.  She was also called in a prescription for Xarelto 20 mg daily as recent monitor study showed atrial fibrillation.  Patient called a few days ago stating that her heart rate is getting down into the 30s and low 40s on a daily basis, and she is concerned.  She was checking with the pulse ox, therefore it was inaccurate.  Device check today showed that she is greater than 90% AT/AF.  Pristinely for the past year, patient has not had any AT/AF on downloads.  EKG today shows atrial flutter with a controlled ventricular rate.     She presents today with complaints of episodes of palpitations lasting anywhere from 1 to 4 minutes everyday over the last 6 weeks. The longer episodes cause her to have dyspnea and some tightness in chest which resolve on it's own.  She states that she did have a prolonged episode that caused her to be lightheaded on one occasion. Episodes happen most the time when she is sitting at her desk at work.  Blood pressure is well-controlled.  She denies any symptoms at this time.  EKG shows atrial flutter with controlled ventricular rate    Previous testing and notes have been reviewed by me.   Past Medical History:   Diagnosis Date    Anemia     ASD (atrial septal defect)     Asthma     Asystole     Post ASD closure asystole and high-grade AV block.  s/p pacemaker 5/11/10.    Celiac disease     DVT (deep venous thrombosis)     Left subclavian DVT following right atrial pacemaker lead revision in 2/14    Edema     Hashimoto's thyroiditis     s/p partial thyroidectomy 1/13/10    Heart palpitations     Hx of thyroid cancer     Malignant neoplasm of thyroid gland     Papillary carcinoma of the thyroid - s/p residual thyroidectomy in 2/11.    Migraine     Nonischemic cardiomyopathy     EF as low as 35% 3/13.  EF 6/21/13 by CHANDANA was 50-55%,  and 53% by echo on 6/17/14.  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  EF 50% by CHANDANA on 4/18/16.    NSVT (nonsustained ventricular tachycardia)     Noted by event recorder 9/12.  EP study by Dr. Aguilar on 11/20/12 showed no inducible VT.    LKUAS on CPAP     Papillary thyroid carcinoma 08/27/2013    Rheumatoid arthritis     Severe and debilitating     Secundum ASD     s/p open closure with a PeriPatch xenograft by Dr. Harper on 5/6/10 by Dr. Harper at [facility].    Sleep apnea     SOB (shortness of breath)     Multifactorial     Thrombus due to any device, implant or graft     Extensive thrombus formation on the pacemaker leads by CHANDANA 3/25/13.  Initiated on Coumadin transiently at that time - improved significantly by CHANDANA 6/21/13.       Past Surgical History:   Procedure Laterality Date    ASD AND VSD REPAIR      CARDIAC ELECTROPHYSIOLOGY PROCEDURE N/A 1/18/2021    Procedure: Generator Change BI Ventriculat PPM- Medtronic;  Surgeon: Jose Mcdowell MD;  Location: [facility] CATH INVASIVE LOCATION;  Service: Cardiology;  Laterality: N/A;    CHOLECYSTECTOMY  2007    ENDOSCOPY AND COLONOSCOPY  12/30/2015    Tom Orta MD    EXCISION MASS TRUNK Right 7/20/2018    Procedure: EXCISION MASS TRUNK, RUQ ABD WALL LIPOMA;  Surgeon: Eduar Marx MD;  [place] [facility] [place] [facility] [place]  Service: General    PACEMAKER IMPLANTATION  05/2010    Inital dual-chamber pacemaker placed 5/11/10 after ASD closure surgery.  Had LV lead placement and generator change on 8/19/13 by Dr. Aguilar (Medtronic #[id number]).  Then had right atrial lead revision on 2/19/14 (for right atrial lead dislodgement and diaphragmatic stimulation) - developed left subclavian DVT afterwards.  Then had laser lead extraction of the abandoned right atrial lead on 4/17/14.    REPLACEMENT TOTAL KNEE BILATERAL      SHOULDER SURGERY Right     THYROID LOBECTOMY Right 2012    THYROIDECTOMY Left 2011    Dr. Coronado, radioactive iodine X3    TONSILLECTOMY       TRICUSPID VALVE SURGERY      #32 MC3 annuloplasty ring by Dr. Harper on 5/6/10 (at time of ASD closure)       Social History     Socioeconomic History    Marital status:     Number of children: 2   Tobacco Use    Smoking status: Never     Passive exposure: Never    Smokeless tobacco: Never   Vaping Use    Vaping Use: Never used   Substance and Sexual Activity    Alcohol use: No    Drug use: No    Sexual activity: Yes     Partners: Male       Family History   Problem Relation Age of Onset    COPD Mother     Heart failure Mother         CHF    Lung disease Mother     Hypertension Mother     Diabetes Mother     Coronary artery disease Mother     Diabetes Sister     Breast cancer Maternal Aunt     Malig Hyperthermia Neg Hx        Review of Systems   Constitutional: Negative.   HENT: Negative.     Eyes: Negative.    Cardiovascular:  Positive for irregular heartbeat and palpitations.   Respiratory: Negative.     Endocrine: Negative.    Hematologic/Lymphatic:        Xarelto   Skin: Negative.    Musculoskeletal:  Positive for arthritis.   Gastrointestinal: Negative.    Genitourinary: Negative.    Neurological: Negative.    Psychiatric/Behavioral: Negative.     Allergic/Immunologic: Negative.        Allergies   Allergen Reactions    Ferumoxytol      Pt was hospitalized    Azithromycin Rash    Sugammadex Hives     Possible reaction with hives/redness in PACU after TSA on 3/16/21.  After chart review, it looks like this was the only mediation that the patient had not previously received.         Current Outpatient Medications:     Actemra ACTPen 162 MG/0.9ML solution auto-injector injection, Every 14 (Fourteen) Days., Disp: , Rfl:     bumetanide (BUMEX) 0.5 MG tablet, Take 2 tablets by mouth Daily As Needed (swelling)., Disp: 90 tablet, Rfl: 1    Ferrous Sulfate Dried 200 (65 FE) MG tablet, Take 1 tablet by mouth Daily., Disp: , Rfl:     folic acid (FOLVITE) 1 MG tablet, Take 1 tablet by mouth Daily., Disp: , Rfl:      "HYDROcodone-acetaminophen (NORCO)  MG per tablet, Take 1 tablet by mouth Every 6 (Six) Hours As Needed., Disp: , Rfl:     ipratropium-albuterol (DUO-NEB) 0.5-2.5 mg/3 ml nebulizer, Take 3 mL by nebulization Every 4 (Four) Hours As Needed for Wheezing or Shortness of Air., Disp: 360 mL, Rfl: 0    leflunomide (ARAVA) 20 MG tablet, Take 1 tablet by mouth Daily., Disp: , Rfl:     levothyroxine (SYNTHROID, LEVOTHROID) 175 MCG tablet, Take 1 tablet by mouth Daily., Disp: , Rfl:     methotrexate 2.5 MG tablet, Take 8 tablets by mouth 1 (One) Time Per Week., Disp: , Rfl:     omeprazole (priLOSEC) 40 MG capsule, Take 1 capsule by mouth Daily., Disp: , Rfl:     pantoprazole (PROTONIX) 40 MG EC tablet, Take 1 tablet by mouth Daily., Disp: , Rfl:     rivaroxaban (XARELTO) 20 MG tablet, Take 1 tablet by mouth Daily With Dinner., Disp: 30 tablet, Rfl: 1    vitamin D (ERGOCALCIFEROL) 08361 units capsule capsule, Take 1 capsule by mouth 3 (Three) Times a Week., Disp: , Rfl:     metoprolol tartrate (LOPRESSOR) 25 MG tablet, Take 1 tablet by mouth 2 (Two) Times a Day., Disp: 180 tablet, Rfl: 3      Objective:     Vitals:    01/04/24 1239   BP: 132/80   Pulse: 73   Weight: 91.1 kg (200 lb 12.8 oz)   Height: 165.1 cm (65\")     Body mass index is 33.41 kg/m².    2D Echocardiogram 06/20/2022:  Left ventricular ejection fraction appears to be 51 - 55%. Left ventricular systolic function is low normal.  Septal wall motion is abnormal, consistent with right ventricular pacing.  Left ventricular diastolic function is consistent with (grade I) impaired relaxation.  Normal right ventricular cavity size and systolic function noted.  The left atrial cavity is mildly dilated.  Mild tricuspid valve regurgitation is present.  Calculated right ventricular systolic pressure from tricuspid regurgitation is 29 mmHg  There is no evidence of pericardial effusion    2D Echocardiogram 2/12/2020:  Estimated EF = 54%.  Left ventricular systolic " function is normal.  Mild pulmonic valve regurgitation is present.  Electronic lead present in the right ventricle.    Stress perfusion study 10/10/2018:  Left ventricular ejection fraction is normal (Calculated EF = 59%).  Myocardial perfusion imaging indicates a normal myocardial perfusion study with no evidence of ischemia.  Impressions are consistent with a low risk study.     Stress echo 10/3/2018:  The stress echo portion is non-diagnostic. Patient is paced, has severe rheumatoid arthritis, and cannot achieve target heart rate  There is akinesis of the basal to mid inferolateral wall..  Left ventricular systolic function is low normal. Calculated EF = 52%.  Left ventricular diastolic dysfunction is noted (grade I) consistent with impaired relaxation.  Normal right ventricular cavity size and systolic function noted.  There is an annuloplasty ring in the tricuspid position. There is trace tricuspid regurgitation  There is no evidence of pericardial effusion.    2D Echocardiogram 9/21/2017:  Left ventricular systolic function is low normal. Calculated EF = 52%  Septal wall motion is abnormal, consistent with right ventricular pacing.  Left ventricular diastolic dysfunction is noted (grade I) consistent with impaired relaxation  Right ventricular cavity is mildly dilated  Normal right ventricular systolic function noted  There is no evidence of pericardial effusion.     PHYSICAL EXAM:    Constitutional:       Appearance: Healthy appearance. Not in distress.   Neck:      Vascular: No JVR. JVD normal.   Pulmonary:      Effort: Pulmonary effort is normal.      Breath sounds: Normal breath sounds. No wheezing. No rhonchi. No rales.   Chest:      Chest wall: Not tender to palpatation.   Cardiovascular:      PMI at left midclavicular line. Normal rate. Irregularly irregular rhythm. Normal S1. Normal S2.       Murmurs: There is no murmur.      No gallop.  No click. No rub.   Pulses:     Intact distal pulses.   Edema:      Peripheral edema absent.   Abdominal:      General: Bowel sounds are normal.      Palpations: Abdomen is soft.      Tenderness: There is no abdominal tenderness.   Musculoskeletal: Normal range of motion.         General: No tenderness. Skin:     General: Skin is warm and dry.   Neurological:      General: No focal deficit present.      Mental Status: Alert and oriented to person, place and time.           ECG 12 Lead    Date/Time: 1/4/2024 2:15 PM  Performed by: Evelina Smith APRN    Authorized by: Evelina Smith APRN  Comparison: compared with previous ECG from 11/9/2023  Rhythm: atrial flutter  BPM: 73  Pacing: ventricular pacing          Assessment:       Diagnosis Plan   1. Nonischemic cardiomyopathy  ECG 12 Lead      2. Presence of cardiac pacemaker  ECG 12 Lead      3. Primary hypertension  ECG 12 Lead      4. Hashimoto's thyroiditis  ECG 12 Lead      5. LUKAS (obstructive sleep apnea) not on CPAP  ECG 12 Lead      6. Atrial flutter with controlled response  ECG 12 Lead      7. AF (paroxysmal atrial fibrillation)  ECG 12 Lead      8. Paroxysmal atrial fibrillation          Orders Placed This Encounter   Procedures    ECG 12 Lead     This order was created via procedure documentation     Order Specific Question:   Release to patient     Answer:   Routine Release [[id number]]          Plan:       History of nonischemic cardiomyopathy due to chronic right ventricular pacing: Resolved with upgrade to CRT pacing  ASD status post patch repair 2010  Hashimoto's thyroiditis and history of thyroid cancer: Follows with Dr. Jacobs.  Recent labs ok  Palpitations: Secondary to PVCs in the past.  On metoprolol succinate daily.  Will change metoprolol to tartrate 25 mg twice daily.  History of thrombus pacemaker wire followed by left subclavian thrombus (2014): Previously on Coumadin.  Simply placed on Xarelto secondary to atrial fibrillation burden on device download.   Obstructive sleep apnea: Noncompliant  with CPAP.  Unable to tolerate.    Ms. Schofield has a scheduled appointment with both Dr. Lucas and RAHAT Pena in May.  Will call sooner for any questions or concerns.  She does have endoscopic procedures scheduled for February.  She is cleared from cardiac standpoint to proceed with the procedures.  She may hold her Xarelto for 2 days prior to procedures         Your medication list            Accurate as of January 4, 2024  2:17 PM. If you have any questions, ask your nurse or doctor.                START taking these medications        Instructions Last Dose Given Next Dose Due   metoprolol tartrate 25 MG tablet  Commonly known as: LOPRESSOR  Started by: RAHAT Sheppard      Take 1 tablet by mouth 2 (Two) Times a Day.              CONTINUE taking these medications        Instructions Last Dose Given Next Dose Due   Actemra ACTPen 162 MG/0.9ML solution auto-injector injection  Generic drug: Tocilizumab      Every 14 (Fourteen) Days.       bumetanide 0.5 MG tablet  Commonly known as: BUMEX      Take 2 tablets by mouth Daily As Needed (swelling).       Ferrous Sulfate Dried 200 (65 Fe) MG tablet tablet      Take 1 tablet by mouth Daily.       folic acid 1 MG tablet  Commonly known as: FOLVITE      Take 1 tablet by mouth Daily.       HYDROcodone-acetaminophen  MG per tablet  Commonly known as: NORCO      Take 1 tablet by mouth Every 6 (Six) Hours As Needed.       ipratropium-albuterol 0.5-2.5 mg/3 ml nebulizer  Commonly known as: DUO-NEB      Take 3 mL by nebulization Every 4 (Four) Hours As Needed for Wheezing or Shortness of Air.       leflunomide 20 MG tablet  Commonly known as: ARAVA      Take 1 tablet by mouth Daily.       levothyroxine 175 MCG tablet  Commonly known as: SYNTHROID, LEVOTHROID      Take 1 tablet by mouth Daily.       methotrexate 2.5 MG tablet      Take 8 tablets by mouth 1 (One) Time Per Week.       omeprazole 40 MG capsule  Commonly known as: priLOSEC      Take 1 capsule  by mouth Daily.       pantoprazole 40 MG EC tablet  Commonly known as: PROTONIX      Take 1 tablet by mouth Daily.       rivaroxaban 20 MG tablet  Commonly known as: XARELTO      Take 1 tablet by mouth Daily With Dinner.       vitamin D 1.25 MG (38364 UT) capsule capsule  Commonly known as: ERGOCALCIFEROL      Take 1 capsule by mouth 3 (Three) Times a Week.              STOP taking these medications      cloNIDine 0.2 MG tablet  Commonly known as: CATAPRES  Stopped by: RAHAT Sheppard        losartan 25 MG tablet  Commonly known as: COZAAR  Stopped by: RAHAT Sheppard        metoprolol succinate XL 25 MG 24 hr tablet  Commonly known as: TOPROL-XL  Stopped by: RAHAT Sheppard                  Where to Get Your Medications        These medications were sent to [facility] - [place], [place] - [address] [place] [address] - [phone]  - [phone] [address] [address]      Phone: [phone]   metoprolol tartrate 25 MG tablet           As always, it has been a pleasure to participate in your patient's care.      Sincerely,       RAHAT Beckett

## 2024-05-15 ENCOUNTER — OFFICE VISIT (OUTPATIENT)
Dept: CARDIOLOGY | Facility: CLINIC | Age: 59
End: 2024-05-15
Payer: OTHER GOVERNMENT

## 2024-05-15 ENCOUNTER — OFFICE VISIT (OUTPATIENT)
Age: 59
End: 2024-05-15
Payer: OTHER GOVERNMENT

## 2024-05-15 ENCOUNTER — CLINICAL SUPPORT NO REQUIREMENTS (OUTPATIENT)
Age: 59
End: 2024-05-15
Payer: OTHER GOVERNMENT

## 2024-05-15 ENCOUNTER — TRANSCRIBE ORDERS (OUTPATIENT)
Dept: CARDIOLOGY | Facility: CLINIC | Age: 59
End: 2024-05-15

## 2024-05-15 VITALS
SYSTOLIC BLOOD PRESSURE: 152 MMHG | WEIGHT: 200 LBS | BODY MASS INDEX: 33.32 KG/M2 | HEIGHT: 65 IN | HEART RATE: 82 BPM | DIASTOLIC BLOOD PRESSURE: 100 MMHG

## 2024-05-15 VITALS
WEIGHT: 205 LBS | DIASTOLIC BLOOD PRESSURE: 98 MMHG | BODY MASS INDEX: 34.16 KG/M2 | HEART RATE: 82 BPM | HEIGHT: 65 IN | SYSTOLIC BLOOD PRESSURE: 150 MMHG

## 2024-05-15 DIAGNOSIS — I42.8 NONISCHEMIC CARDIOMYOPATHY: Primary | ICD-10-CM

## 2024-05-15 DIAGNOSIS — G47.33 OSA (OBSTRUCTIVE SLEEP APNEA): ICD-10-CM

## 2024-05-15 DIAGNOSIS — I10 PRIMARY HYPERTENSION: ICD-10-CM

## 2024-05-15 DIAGNOSIS — I48.92 ATRIAL FLUTTER WITH CONTROLLED RESPONSE: Primary | ICD-10-CM

## 2024-05-15 DIAGNOSIS — I42.8 NONISCHEMIC CARDIOMYOPATHY: ICD-10-CM

## 2024-05-15 DIAGNOSIS — E28.39 BMP15-RELATED PREMATURE OVARIAN FAILURE: ICD-10-CM

## 2024-05-15 DIAGNOSIS — I48.19 PERSISTENT ATRIAL FIBRILLATION: ICD-10-CM

## 2024-05-15 DIAGNOSIS — Z95.0 PRESENCE OF CARDIAC PACEMAKER: ICD-10-CM

## 2024-05-15 DIAGNOSIS — R79.89 ABNORMAL CBC: Primary | ICD-10-CM

## 2024-05-15 PROBLEM — Z16.12 UTI DUE TO EXTENDED-SPECTRUM BETA LACTAMASE (ESBL) PRODUCING ESCHERICHIA COLI: Status: RESOLVED | Noted: 2019-03-11 | Resolved: 2024-05-15

## 2024-05-15 PROBLEM — N39.0 UTI (URINARY TRACT INFECTION): Status: RESOLVED | Noted: 2019-03-11 | Resolved: 2024-05-15

## 2024-05-15 PROBLEM — B96.29 UTI DUE TO EXTENDED-SPECTRUM BETA LACTAMASE (ESBL) PRODUCING ESCHERICHIA COLI: Status: RESOLVED | Noted: 2019-03-11 | Resolved: 2024-05-15

## 2024-05-15 PROBLEM — N39.0 UTI DUE TO EXTENDED-SPECTRUM BETA LACTAMASE (ESBL) PRODUCING ESCHERICHIA COLI: Status: RESOLVED | Noted: 2019-03-11 | Resolved: 2024-05-15

## 2024-05-15 PROBLEM — J18.9 PNA (PNEUMONIA): Status: RESOLVED | Noted: 2019-03-11 | Resolved: 2024-05-15

## 2024-05-15 PROBLEM — J18.9 PNEUMONIA: Status: RESOLVED | Noted: 2019-03-12 | Resolved: 2024-05-15

## 2024-05-15 PROBLEM — Z12.11 COLON CANCER SCREENING: Status: RESOLVED | Noted: 2023-08-07 | Resolved: 2024-05-15

## 2024-05-15 PROBLEM — J98.01 BRONCHOSPASM: Status: RESOLVED | Noted: 2019-03-11 | Resolved: 2024-05-15

## 2024-05-15 PROCEDURE — 93000 ELECTROCARDIOGRAM COMPLETE: CPT | Performed by: INTERNAL MEDICINE

## 2024-05-15 PROCEDURE — 99214 OFFICE O/P EST MOD 30 MIN: CPT | Performed by: INTERNAL MEDICINE

## 2024-05-15 RX ORDER — VALSARTAN 80 MG/1
80 TABLET ORAL NIGHTLY
Qty: 90 TABLET | Refills: 3 | Status: SHIPPED | OUTPATIENT
Start: 2024-05-15

## 2024-05-15 NOTE — PROGRESS NOTES
Date of Office Visit: 05/15/2024  Encounter Provider: Yennifer Viera MD  Place of Service: [facility] CARDIOLOGY  Patient Name: Whitney Schofield  :1965      Patient ID:  Whitney Schofield is a 58 y.o. 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     The patient originally presented to [facility] with symptoms of palpitations and  shortness of breath in 2009. She subsequently was diagnosed with Hashimoto  thyroiditis, and underwent a partial thyroidectomy in 2010. As part of a routine  follow-up, she had a two-dimensional echocardiogram performed on 3/1/2010 which   revealed a severely dilated right ventricle and significant pulmonary hypertension. A CT   scan of her chest did not show any evidence for pulmonary embolus or other acute lung   pathology.  She underwent a transesophageal echocardiogram on 3/16/2010 which   revealed a large secundum ASD high in the atrial septum. 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 She was referred to   Dr. Harper for evaluation to surgically repair the ASD, and underwent a preoperative   cardiac catheterization on 3/26/2010. This revealed angiographically normal coronary   arteries, with a Qp:Qs of 2.3, consistent with a large shunt. The patient underwent   closure of the ASD, as well as tricuspid valve repair with an annuloplasty ring by Dr. Harper at [facility] on 05/06/2010. Her postoperative period was complicated by   complete AV block and accelerated junctional escape rhythm. She underwent   placement of a permanent pacemaker on 5/11/2010.      The patient continued to have significant shortness of breath and a 24-hour Holter   monitor and two-dimensional echocardiogram were obtained on 5/12/2011. Her   Holter monitor did show several brief episodes of tachycardia. The echocardiogram   did not show any significant findings other than a \"shuttering motion\" of the septum.   She underwent a right and left heart catheterization on 7/20/2011 to assess for any   evidence of constriction. There was no evidence of restriction or constriction on the   heart catheterization, although she did have pulmonary hypertension with a mean   pulmonary artery pressure of 30 mmHg. A transesophageal echocardiogram was also   performed on 6/20/2011 which revealed an ejection fraction of 55-60%, and an intact   ASD repair. An electrophysiology study was performed by Dr. Foster on 8/1/2011.   This was performed through her pacemaker in conjunction with an isoproterenol   infusion. This showed no inducible tachycardia or ventricular tachycardia.       The patient eventually wore a 21-day event recorder and was noted to go into   wide-complex tachycardia on 9/30/2012. She had multiple other episodes as well.   Dr. Aguilar from Electrophysiology saw the patient and felt this represented ventricular   tachycardia. She did ultimately undergo a repeat electrophysiology study on   11/20/2012 by Dr. Aguilar which confirmed complete infra-hisian block with no   conduction retrograde or antegrade. No ventricular tachycardia was induced. She   was started on beta blockers at that time.       At the patient's visit in 3/2013 she complained of severe volume retention and edema.    She was also more short of breath and had gained a significant amount of weight.   She was admitted to the hospital on " 3/25/2013 and underwent a transesophageal   echocardiogram.  Her ejection fraction had decreased significantly to 35%, and there   was an extensive amount of thrombus formation on her pacemaker wires. Her ASD   repair appeared to be in good condition. She did undergo a CT scan of the chest   which showed no evidence for pulmonary embolism, as well as a CT scan of the   abdomen which showed no evidence for inferior vena cava obstruction or clotting.   She was diuresed extensively and placed on Coumadin at that time. 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 She ultimately underwent left   ventricular lead placement and pacemaker replacement on 8/19/2013 by Dr. Aguilar.   She then had right atrial lead dislodgement and diaphragmatic stimulation. A right   atrial lead revision was performed on 2/19/2014; however, the patient developed a   significant left subclavian vein thrombosis post procedurally. She again was placed   on Coumadin at that time, which was then switched to Xarelto. She ultimately   underwent laser lead extraction of the abandoned right atrial lead on 4/17/2014. She   did undergo a right heart cath on 4/13/2015 which showed only mild pulmonary   hypertension (mean PA pressure 29 mm Hg).     She is , has 2 children, works as a , has never smoked, uses no alcohol and has 1 cup of coffee per day.      Echo done 6/20/2022 shows ejection fraction of 51-55% with septal wall motion abnormality consistent with RV pacing, grade 1 diastolic dysfunction, mildly dilated left atrium with mild tricuspid insufficiency, no pericardial effusion and normal RVSP.  She had a nonischemic stress nuclear perfusion study done 10/10/2018.  She had normal bilateral lower extremity venous duplex study done 1/26/2022.       Labs on 5/1/2024 show normal creatinine, normal AST and ALT, elevated CRP at 1.7, elevated ESR 32, normal CBC.  Labs done 8/3/2023 show unremarkable CBC, free T4  elevated 1.52, TSH low at 0.090, low vitamin D, triglycerides 72, total cholesterol 148, HDL 41, LDL 93, VLDL 14 normal CMP, elevated thyroglobulin antibody.  Pacemaker interrogation done today shows that she has been chronically in atrial flutter since 11/2023.    Interrogation of device today showed 5 years of battery left, 95% LV pacing, 95% BiV pacing, 95% RV pacing, chronic atrial fibrillation/flutter since 11/2023.  She has had significant fatigue and short windedness.  She has no chest pain or pressure.  She has had no syncope but some lightheadedness.  She has not been checking her blood pressure at home but she can.  She has no orthopnea or PND.  She has no lower extremity edema.  She just has not felt well with very poor energy.    Past Medical History:   Diagnosis Date   • Anemia    • ASD (atrial septal defect)    • Asthma    • Asystole     Post ASD closure asystole and high-grade AV block.  s/p pacemaker 5/11/10.   • Celiac disease    • DVT (deep venous thrombosis)     Left subclavian DVT following right atrial pacemaker lead revision in 2/14   • Edema    • Hashimoto's thyroiditis     s/p partial thyroidectomy 1/13/10   • Heart palpitations    • Hx of thyroid cancer    • Malignant neoplasm of thyroid gland     Papillary carcinoma of the thyroid - s/p residual thyroidectomy in 2/11.   • Migraine    • Nonischemic cardiomyopathy     EF as low as 35% 3/13.  EF 6/21/13 by CHANDANA was 50-55%, and 53% by echo on 6/17/14.  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  EF 50% by CHANDANA on 4/18/16.   • NSVT (nonsustained ventricular tachycardia)     Noted by event recorder 9/12.  EP study by Dr. Aguilar on 11/20/12 showed no inducible VT.   • LUKAS on CPAP    • Papillary thyroid carcinoma 08/27/2013   • Rheumatoid arthritis     Severe and debilitating    • Secundum ASD     s/p open closure with a PeriPatch xenograft by Dr. Harper on 5/6/10 by Dr. [facility] at [facility].   • Sleep apnea    • SOB (shortness of  breath)     Multifactorial    • Thrombus due to any device, implant or graft     Extensive thrombus formation on the pacemaker leads by CHANDANA 3/25/13.  Initiated on Coumadin transiently at that time - improved significantly by CHANDANA 6/21/13.         Past Surgical History:   Procedure Laterality Date   • ASD AND VSD REPAIR     • CARDIAC ELECTROPHYSIOLOGY PROCEDURE N/A 1/18/2021    Procedure: Generator Change BI Ventriculat PPM- Medtronic;  Surgeon: Jose Mcdowell MD;  Location: [facility] CATH INVASIVE LOCATION;  Service: Cardiology;  Laterality: N/A;   • CHOLECYSTECTOMY  2007   • ENDOSCOPY AND COLONOSCOPY  12/30/2015    Tom Orta MD   • EXCISION MASS TRUNK Right 7/20/2018    Procedure: EXCISION MASS TRUNK, RUQ ABD WALL LIPOMA;  Surgeon: Eduar Marx MD;  Location: [facility] OR [facility] [place], [place];  Service: General   • PACEMAKER IMPLANTATION  05/2010    Inital dual-chamber pacemaker placed 5/11/10 after ASD closure surgery.  Had LV lead placement and generator change on 8/19/13 by Dr. Aguilar (Medtronic #[id number]).  Then had right atrial lead revision on 2/19/14 (for right atrial lead dislodgement and diaphragmatic stimulation) - developed left subclavian DVT afterwards.  Then had laser lead extraction of the abandoned right atrial lead on 4/17/14.   • REPLACEMENT TOTAL KNEE BILATERAL     • SHOULDER SURGERY Right    • THYROID LOBECTOMY Right 2012   • THYROIDECTOMY Left 2011    Dr. Coronado, radioactive iodine X3   • TONSILLECTOMY     • TRICUSPID VALVE SURGERY      #32 MC3 annuloplasty ring by Dr. Harper on 5/6/10 (at time of ASD closure)       Current Outpatient Medications on File Prior to Visit   Medication Sig Dispense Refill   • Actemra ACTPen 162 MG/0.9ML solution auto-injector injection Every 14 (Fourteen) Days.     • bumetanide (BUMEX) 0.5 MG tablet Take 2 tablets by mouth Daily As Needed (swelling). 90 tablet 1   • Ferrous Sulfate Dried 200 (65 FE) MG tablet Take 1 tablet by mouth Daily.     • folic acid (FOLVITE) 1 MG  "tablet Take 1 tablet by mouth Daily.     • HYDROcodone-acetaminophen (NORCO)  MG per tablet Take 1 tablet by mouth Every 6 (Six) Hours As Needed.     • ipratropium-albuterol (DUO-NEB) 0.5-2.5 mg/3 ml nebulizer Take 3 mL by nebulization Every 4 (Four) Hours As Needed for Wheezing or Shortness of Air. 360 mL 0   • leflunomide (ARAVA) 20 MG tablet Take 1 tablet by mouth Daily.     • levothyroxine (SYNTHROID, LEVOTHROID) 175 MCG tablet Take 1 tablet by mouth Daily.     • methotrexate 2.5 MG tablet Take 8 tablets by mouth 1 (One) Time Per Week.     • omeprazole (priLOSEC) 40 MG capsule Take 1 capsule by mouth Daily.     • pantoprazole (PROTONIX) 40 MG EC tablet Take 1 tablet by mouth Daily.     • rivaroxaban (XARELTO) 20 MG tablet Take 1 tablet by mouth Daily With Dinner. 30 tablet 1   • vitamin D (ERGOCALCIFEROL) 45330 units capsule capsule Take 1 capsule by mouth 3 (Three) Times a Week.     • [DISCONTINUED] metoprolol tartrate (LOPRESSOR) 25 MG tablet Take 1 tablet by mouth 2 (Two) Times a Day. 180 tablet 3     No current facility-administered medications on file prior to visit.       Social History     Socioeconomic History   • Marital status:    • Number of children: 2   Tobacco Use   • Smoking status: Never     Passive exposure: Never   • Smokeless tobacco: Never   Vaping Use   • Vaping status: Never Used   Substance and Sexual Activity   • Alcohol use: No   • Drug use: No   • Sexual activity: Yes     Partners: Male             Procedures    ECG 12 Lead    Date/Time: 5/15/2024 12:43 PM  Performed by: Yennifer Viera MD    Authorized by: Yennifer Viera MD  Comparison: compared with previous ECG   Rhythm: atrial flutter  Pacing: ventricular paced rhythm  Clinical impression: abnormal EKG            Objective:      Vitals:    05/15/24 1204   BP: 150/98   Pulse: 82   Weight: 93 kg (205 lb)   Height: 165.1 cm (65\")     Body mass index is 34.11 kg/m².    Vitals reviewed.   Constitutional:       " General: Not in acute distress.     Appearance: Not diaphoretic.   Neck:      Vascular: No carotid bruit or JVD.   Pulmonary:      Effort: Pulmonary effort is normal.      Breath sounds: Normal breath sounds.   Cardiovascular:      Regularly irregular rhythm.      No gallop.    Pulses:     Intact distal pulses.      Carotid: 2+ bilaterally.     Radial: 2+ bilaterally.     Dorsalis pedis: 2+ bilaterally.     Posterior tibial: 2+ bilaterally.  Edema:     Peripheral edema absent.   Neurological:      Cranial Nerves: No cranial nerve deficit.       Lab Review:       Assessment:      Diagnosis Plan   1. Atrial flutter with controlled response  Adult Transthoracic Echo Complete W/ Cont if Necessary Per Protocol    Cardioversion External in Cardiology Department      2. s/p Medtronic dual chamber ICD        3. Primary hypertension        4. Nonischemic cardiomyopathy  Adult Transthoracic Echo Complete W/ Cont if Necessary Per Protocol        Large secundum atrial septal defect status post patch repair in 2010  Tricuspid valve insufficiency status post tricuspid repair with annuloplasty ring in 2010  Pacemaker placed in 2010 due to complete AV block.  History of nonischemic cardiomyopathy due to chronic right ventricular pacing, resolved with upgrade to CRT pacing.  Recheck echocardiogram  Rheumatoid arthritis, sees Dr. Ozuna.   Hashimoto's thyroiditis and history of thyroid cancer, follows with Dr. Bravo.   Palpitations due to PVCs in the past.  On metoprolol  Atrial flutter.  Set up cardioversion for symptomatic atrial flutter causing fatigue.  Elevated blood pressure.  Advised her to check her blood pressure at home.  Her goal is less than 120/80.     Plan:       See Janene in 6 weeks, start valsartan 80 mg nightly for hypertension.  Set up echocardiogram and cardioversion.

## 2024-05-15 NOTE — PROGRESS NOTES
ELECTROPHYSIOLOGY   Date of Office Visit: 05/15/2024  Patient Name: Whitney Schofield  : 1965  Encounter Provider: Darnell Triplett PA-C  Primary Cardiologist: Yennifer Viera MD  Electrophysiologist: Dr. Lizama and previously Dr. Noel  CHIEF COMPLAINT / REASON FOR OFFICE VISIT     NICM and Pacemaker Check  Nonischemic cardiomyopathy, ICD    HISTORY OF PRESENT ILLNESS     This is a 58 y.o. year old female who presents to [facility] CARDIOLOGY for a for a 6 month follow up.     She has history of a prior surgically repaired atrial septal defect in .  Postoperatively she developed a complete heart block and had a pacemaker implantation.    She eventually developed a cardiomyopathy and her pacemaker was upgraded to a Medtronic ICD.  After implant she had a vein thrombus/lead thrombus and lead dislodgment in . She had a device generator change 2021.    On device interrogations the RV threshold has been slightly elevated and she paces 99% of the time.    She has been in persistent AF since 2023. She followed up in office in January and was started on metoprolol 25 mg twice daily.     Device interrogation today shows normal device function. BVP 96.2%, RV 96.2%. Her atrial rate is in 140s.    Today the patient reports over the last 2 months she is having persistent fatigue and worsening dyspnea on exertion.  She reports feeling palpitations and her heart racing throughout the day.  This did get better when she started her metoprolol but it has not completely resolved.    She denies any episodes of dizziness, lightheadedness, near-syncope, worsening lower extremity edema or orthopnea.    She continues to follow closely with rheumatology at [facility] for her RA.  She is on methotrexate 20 mg weekly.    She works at the bank as a manager.     PMHx:  NICM, HTN, PAF, Hashimoto's thyroiditis, gastroparesis, recurrent UTI, adenocarcinoma of the thyroid, obstructive sleep  "apnea with CPAP use, asthma, rheumatoid arthritis, ASD closure 5/11/2010    PHYSICAL EXAMINATION     Vital Signs:  /100   Pulse 82   Ht 165.1 cm (65\")   Wt 90.7 kg (200 lb)   BMI 33.28 kg/m²   Estimated body mass index is 33.28 kg/m² as calculated from the following:    Height as of this encounter: 165.1 cm (65\").    Weight as of this encounter: 90.7 kg (200 lb).               Physical Exam  Constitutional:       Appearance: Normal appearance.   HENT:      Head: Normocephalic and atraumatic.   Cardiovascular:      Rate and Rhythm: Normal rate and regular rhythm.      Pulses: Normal pulses.      Heart sounds: Normal heart sounds.   Pulmonary:      Effort: Pulmonary effort is normal.      Breath sounds: Normal breath sounds.   Musculoskeletal:      Right lower leg: No edema.      Left lower leg: No edema.   Skin:     General: Skin is warm and dry.   Neurological:      General: No focal deficit present.      Mental Status: She is alert and oriented to person, place, and time.          Cardiac Testing/Results     Cardiac Testing:   - Echo 6/20/2022: EF of 51 to 55% with Low normal LV systolic function.  Grade 1 diastolic dysfunction.  Mild TR.  RVSP 29 mmHg.  Mildly dilated left atrium    Result Review :  The following data was reviewed by: Darnell Triplett PA-C on 05/15/2024:       Lab Results   Component Value Date     11/30/2022     05/18/2022    K 4.1 11/30/2022    K 4.4 05/18/2022     11/30/2022     05/18/2022    CO2 28 11/30/2022    CO2 24 05/18/2022    BUN 11 11/30/2022    BUN 16 05/18/2022    CREATININE 0.73 03/01/2023    CREATININE 0.75 11/30/2022    EGFRIFNONA 75 03/04/2021    EGFRIFNONA 69 01/14/2021    EGFRIFAFRI >60 03/01/2023    EGFRIFAFRI >60 11/30/2022    GLUCOSE 103 (H) 03/04/2021    GLUCOSE 109 (H) 01/14/2021    CALCIUM 8.9 11/30/2022    CALCIUM 8.4 05/18/2022    ALBUMIN 4.0 11/30/2022    ALBUMIN 4.1 05/18/2022    AST 15 05/01/2024    AST 14 01/31/2024    ALT 12 " "05/01/2024    ALT 12 01/31/2024     Lab Results   Component Value Date    WBC 5.27 05/01/2024    WBC 5.81 01/31/2024    HGB 12.5 05/01/2024    HGB 12.9 01/31/2024    HCT 39.3 05/01/2024    HCT 41.0 01/31/2024    MCV 84.2 05/01/2024    MCV 84.2 01/31/2024     05/01/2024     01/31/2024     No results found for: \"PROBNP\", \"BNP\"  No results found for: \"CKTOTAL\", \"CKMB\", \"CKMBINDEX\", \"TROPONINI\", \"TROPONINT\"  Lab Results   Component Value Date    TSH 0.011 (L) 07/14/2021    TSH 0.033 (L) 03/04/2021                 ECG 12 Lead    Date/Time: 5/15/2024 10:41 AM  Performed by: Darnell Shipman PA-C    Authorized by: Darnell Shipman PA-C  Comparison: compared with previous ECG from 1/4/2024  Rhythm: paced  Rate: normal  BPM: 82  Pacing: ventricular pacingComments: Underlying AF                ASSESSMENT & PLAN       Diagnoses and all orders for this visit:    1. Nonischemic cardiomyopathy (Primary)  She has longstanding history nonischemic cardiomyopathy that has improved with CRT pacing.  Most recent echocardiogram showed EF of 55%.  She is following up with her primary cardiologist today Dr. Viera  No increasing symptoms of volume overload.  She takes Bumex as needed  2. Persistent atrial fibrillation  She has been in persistent atrial fibrillation since November 2023.  She had symptomatic fatigue, shortness of breath and palpitations  Symptoms have minimally improved with starting metoprolol 25 mg twice daily.  Recommend increasing metoprolol to 50 mg twice daily  She is always had a history of paroxysmal atrial fibrillation but this is the first time that has become persistent  She currently takes rivaroxaban 20 mg daily, denies bleeding complications or missing any dosing  She benefits from a cardioversion to restore sinus rhythm.  I discussed this with Dr. Viera  She did note that she has had an ablation in the past but I believe this was for her thyroid  3. s/p Medtronic dual chamber " ICD  -     ECG 12 Lead  Normal device function on interrogation today.  She is 96.2% V paced beats  There has been elevated RV impedance levels however leads are working well today.  Will evaluate this again in the future  4. LUKAS (obstructive sleep apnea) not on CPAP  Continue compliance with CPAP      Follow Up:  Return in about 3 months (around 8/15/2024) for Dr. Lizama-Yakov.  Patient was given instructions and counseling regarding her condition or for health maintenance advice. Please contact office if worsening symptoms or proceed to ER when appropriate.      Darnell Triplett PA-C  05/15/24  11:11 EDT    MEDICATIONS         Discharge Medications            Accurate as of May 15, 2024 11:11 AM. If you have any questions, ask your nurse or doctor.                Changes to Medications        Instructions Start Date   metoprolol tartrate 25 MG tablet  Commonly known as: LOPRESSOR  What changed: how much to take  Changed by: Darnell Triplett PA-C   50 mg, Oral, 2 Times Daily             Continue These Medications        Instructions Start Date   Actemra ACTPen 162 MG/0.9ML solution auto-injector injection  Generic drug: Tocilizumab   Every 14 Days      bumetanide 0.5 MG tablet  Commonly known as: BUMEX   1 mg, Oral, Daily PRN      Ferrous Sulfate Dried 200 (65 Fe) MG tablet tablet   200 mg, Oral, Daily      folic acid 1 MG tablet  Commonly known as: FOLVITE   1 mg, Oral, Daily      HYDROcodone-acetaminophen  MG per tablet  Commonly known as: NORCO   1 tablet, Oral, Every 6 Hours PRN      ipratropium-albuterol 0.5-2.5 mg/3 ml nebulizer  Commonly known as: DUO-NEB   3 mL, Nebulization, Every 4 Hours PRN      leflunomide 20 MG tablet  Commonly known as: ARAVA   20 mg, Oral, Daily      levothyroxine 175 MCG tablet  Commonly known as: SYNTHROID, LEVOTHROID   175 mcg, Oral, Daily      methotrexate 2.5 MG tablet   20 mg, Oral, Weekly      omeprazole 40 MG capsule  Commonly known as: priLOSEC   40 mg, Oral,  Daily      pantoprazole 40 MG EC tablet  Commonly known as: PROTONIX   40 mg, Oral, Daily      rivaroxaban 20 MG tablet  Commonly known as: XARELTO   20 mg, Oral, Daily With Dinner      vitamin D 1.25 MG (16361 UT) capsule capsule  Commonly known as: ERGOCALCIFEROL   50,000 Units, Oral, 3 Times Weekly                   **Gamal Disclaimer: This note was dictated using an electronic transcription. The electronic translation of spoken language may permit erroneous, or at times, nonsensical words or phrases to be inadvertently transcribed. Although I have reviewed the note for such errors, some may still exist.

## 2024-05-17 ENCOUNTER — PATIENT MESSAGE (OUTPATIENT)
Dept: CARDIOLOGY | Facility: CLINIC | Age: 59
End: 2024-05-17
Payer: OTHER GOVERNMENT

## 2024-06-12 ENCOUNTER — HOSPITAL ENCOUNTER (OUTPATIENT)
Dept: CARDIOLOGY | Facility: HOSPITAL | Age: 59
Discharge: HOME | End: 2024-06-12
Admitting: INTERNAL MEDICINE
Payer: OTHER GOVERNMENT

## 2024-06-12 VITALS
SYSTOLIC BLOOD PRESSURE: 112 MMHG | HEIGHT: 65 IN | DIASTOLIC BLOOD PRESSURE: 60 MMHG | WEIGHT: 205.03 LBS | OXYGEN SATURATION: 100 % | BODY MASS INDEX: 34.16 KG/M2 | HEART RATE: 49 BPM

## 2024-06-12 DIAGNOSIS — I48.92 ATRIAL FLUTTER WITH CONTROLLED RESPONSE: ICD-10-CM

## 2024-06-12 DIAGNOSIS — I42.8 NONISCHEMIC CARDIOMYOPATHY: ICD-10-CM

## 2024-06-12 LAB
AORTIC ARCH: 3.4 CM
AORTIC DIMENSIONLESS INDEX: 0.8 (DI)
ASCENDING AORTA: 3.2 CM
BH CV ECHO MEAS - ACS: 2.1 CM
BH CV ECHO MEAS - AO MAX PG: 6 MMHG
BH CV ECHO MEAS - AO MEAN PG: 3 MMHG
BH CV ECHO MEAS - AO V2 MAX: 122 CM/SEC
BH CV ECHO MEAS - AO V2 VTI: 29.9 CM
BH CV ECHO MEAS - AVA(I,D): 3.3 CM2
BH CV ECHO MEAS - EDV(CUBED): 157.5 ML
BH CV ECHO MEAS - EDV(MOD-SP2): 106 ML
BH CV ECHO MEAS - EDV(MOD-SP4): 93 ML
BH CV ECHO MEAS - EF(MOD-BP): 59.4 %
BH CV ECHO MEAS - EF(MOD-SP2): 63.2 %
BH CV ECHO MEAS - EF(MOD-SP4): 58.1 %
BH CV ECHO MEAS - ESV(CUBED): 65.6 ML
BH CV ECHO MEAS - ESV(MOD-SP2): 39 ML
BH CV ECHO MEAS - ESV(MOD-SP4): 39 ML
BH CV ECHO MEAS - FS: 25.3 %
BH CV ECHO MEAS - IVS/LVPW: 1 CM
BH CV ECHO MEAS - IVSD: 0.9 CM
BH CV ECHO MEAS - LV DIASTOLIC VOL/BSA (35-75): 46.5 CM2
BH CV ECHO MEAS - LV MASS(C)D: 180.1 GRAMS
BH CV ECHO MEAS - LV MAX PG: 4.8 MMHG
BH CV ECHO MEAS - LV MEAN PG: 2 MMHG
BH CV ECHO MEAS - LV SYSTOLIC VOL/BSA (12-30): 19.5 CM2
BH CV ECHO MEAS - LV V1 MAX: 110 CM/SEC
BH CV ECHO MEAS - LV V1 VTI: 23 CM
BH CV ECHO MEAS - LVIDD: 5.4 CM
BH CV ECHO MEAS - LVIDS: 4 CM
BH CV ECHO MEAS - LVOT AREA: 4.2 CM2
BH CV ECHO MEAS - LVOT DIAM: 2.32 CM
BH CV ECHO MEAS - LVPWD: 0.9 CM
BH CV ECHO MEAS - MV DEC SLOPE: 364.6 CM/SEC2
BH CV ECHO MEAS - MV MAX PG: 7.5 MMHG
BH CV ECHO MEAS - MV MEAN PG: 2.22 MMHG
BH CV ECHO MEAS - MV P1/2T: 99.3 MSEC
BH CV ECHO MEAS - MV V2 VTI: 27.9 CM
BH CV ECHO MEAS - MVA(P1/2T): 2.22 CM2
BH CV ECHO MEAS - MVA(VTI): 3.5 CM2
BH CV ECHO MEAS - PA ACC TIME: 0.08 SEC
BH CV ECHO MEAS - PA V2 MAX: 96.4 CM/SEC
BH CV ECHO MEAS - QP/QS: 0.95
BH CV ECHO MEAS - RAP SYSTOLE: 3 MMHG
BH CV ECHO MEAS - RV MAX PG: 2.12 MMHG
BH CV ECHO MEAS - RV V1 MAX: 72.8 CM/SEC
BH CV ECHO MEAS - RV V1 VTI: 16.1 CM
BH CV ECHO MEAS - RVOT DIAM: 2.7 CM
BH CV ECHO MEAS - RVSP: 32 MMHG
BH CV ECHO MEAS - SV(LVOT): 97.3 ML
BH CV ECHO MEAS - SV(MOD-SP2): 67 ML
BH CV ECHO MEAS - SV(MOD-SP4): 54 ML
BH CV ECHO MEAS - SV(RVOT): 92.7 ML
BH CV ECHO MEAS - SVI(LVOT): 48.7 ML/M2
BH CV ECHO MEAS - SVI(MOD-SP2): 33.5 ML/M2
BH CV ECHO MEAS - SVI(MOD-SP4): 27 ML/M2
BH CV ECHO MEAS - TAPSE (>1.6): 1.5 CM
BH CV ECHO MEAS - TR MAX PG: 29.3 MMHG
BH CV ECHO MEAS - TR MAX VEL: 270.6 CM/SEC
BH CV XLRA - RV BASE: 3.5 CM
BH CV XLRA - RV LENGTH: 7.6 CM
BH CV XLRA - RV MID: 2.6 CM
LEFT ATRIUM VOLUME INDEX: 52 ML/M2
SINUS: 3.2 CM
STJ: 2.6 CM
TV MEAN GRADIENT: 2 MMHG

## 2024-06-12 PROCEDURE — 93306 TTE W/DOPPLER COMPLETE: CPT

## 2024-06-14 ENCOUNTER — TELEPHONE (OUTPATIENT)
Dept: CARDIOLOGY | Facility: CLINIC | Age: 59
End: 2024-06-14
Payer: OTHER GOVERNMENT

## 2024-06-14 NOTE — TELEPHONE ENCOUNTER
Reviewed results and recommendations with Whitney Schofield.  Patient verbalized understanding of results and recommendations.    Martina,  Patient had a follow up question regarding her atrial cavity:    Left Atrium The left atrial cavity is severely dilated. Left atrial volume is severely increased.      Patient is asking for additional information on this finding.  What caused this finding?    Thank you,  Sana QUARLES RN  Triage Nurse [facility] [place]   13:06 EDT

## 2024-06-14 NOTE — TELEPHONE ENCOUNTER
Please let her know that echo looks good.  Tricuspid valve is functioning well after annuloplasty.  There is no residual shunt after ASD repair.  Continue current medications and keep currently scheduled follow-up appointment.

## 2024-06-21 ENCOUNTER — LAB (OUTPATIENT)
Dept: LAB | Facility: HOSPITAL | Age: 59
End: 2024-06-21
Payer: OTHER GOVERNMENT

## 2024-06-21 DIAGNOSIS — E28.39 BMP15-RELATED PREMATURE OVARIAN FAILURE: ICD-10-CM

## 2024-06-21 DIAGNOSIS — R79.89 ABNORMAL CBC: ICD-10-CM

## 2024-06-21 LAB
ANION GAP SERPL CALCULATED.3IONS-SCNC: 8.6 MMOL/L (ref 5–15)
BASOPHILS # BLD AUTO: 0.03 10*3/MM3 (ref 0–0.2)
BASOPHILS NFR BLD AUTO: 0.6 % (ref 0–1.5)
BUN SERPL-MCNC: 20 MG/DL (ref 6–20)
BUN/CREAT SERPL: 18.5 (ref 7–25)
CALCIUM SPEC-SCNC: 8.3 MG/DL (ref 8.6–10.5)
CHLORIDE SERPL-SCNC: 104 MMOL/L (ref 98–107)
CO2 SERPL-SCNC: 27.4 MMOL/L (ref 22–29)
CREAT SERPL-MCNC: 1.08 MG/DL (ref 0.57–1)
DEPRECATED RDW RBC AUTO: 46.1 FL (ref 37–54)
EGFRCR SERPLBLD CKD-EPI 2021: 59.3 ML/MIN/1.73
EOSINOPHIL # BLD AUTO: 0.2 10*3/MM3 (ref 0–0.4)
EOSINOPHIL NFR BLD AUTO: 3.8 % (ref 0.3–6.2)
ERYTHROCYTE [DISTWIDTH] IN BLOOD BY AUTOMATED COUNT: 14.8 % (ref 12.3–15.4)
GLUCOSE SERPL-MCNC: 100 MG/DL (ref 65–99)
HCT VFR BLD AUTO: 38.8 % (ref 34–46.6)
HGB BLD-MCNC: 12.2 G/DL (ref 12–15.9)
IMM GRANULOCYTES # BLD AUTO: 0.02 10*3/MM3 (ref 0–0.05)
IMM GRANULOCYTES NFR BLD AUTO: 0.4 % (ref 0–0.5)
LYMPHOCYTES # BLD AUTO: 1.3 10*3/MM3 (ref 0.7–3.1)
LYMPHOCYTES NFR BLD AUTO: 24.4 % (ref 19.6–45.3)
MCH RBC QN AUTO: 26.8 PG (ref 26.6–33)
MCHC RBC AUTO-ENTMCNC: 31.4 G/DL (ref 31.5–35.7)
MCV RBC AUTO: 85.3 FL (ref 79–97)
MONOCYTES # BLD AUTO: 0.52 10*3/MM3 (ref 0.1–0.9)
MONOCYTES NFR BLD AUTO: 9.8 % (ref 5–12)
NEUTROPHILS NFR BLD AUTO: 3.25 10*3/MM3 (ref 1.7–7)
NEUTROPHILS NFR BLD AUTO: 61 % (ref 42.7–76)
NRBC BLD AUTO-RTO: 0 /100 WBC (ref 0–0.2)
PLATELET # BLD AUTO: 201 10*3/MM3 (ref 140–450)
PMV BLD AUTO: 9.4 FL (ref 6–12)
POTASSIUM SERPL-SCNC: 4.3 MMOL/L (ref 3.5–5.2)
RBC # BLD AUTO: 4.55 10*6/MM3 (ref 3.77–5.28)
SODIUM SERPL-SCNC: 140 MMOL/L (ref 136–145)
WBC NRBC COR # BLD AUTO: 5.32 10*3/MM3 (ref 3.4–10.8)

## 2024-06-21 PROCEDURE — 80048 BASIC METABOLIC PNL TOTAL CA: CPT

## 2024-06-21 PROCEDURE — 36415 COLL VENOUS BLD VENIPUNCTURE: CPT

## 2024-06-21 PROCEDURE — 85025 COMPLETE CBC W/AUTO DIFF WBC: CPT

## 2024-06-25 ENCOUNTER — TELEPHONE (OUTPATIENT)
Dept: CARDIOLOGY | Facility: CLINIC | Age: 59
End: 2024-06-25
Payer: OTHER GOVERNMENT

## 2024-06-25 NOTE — TELEPHONE ENCOUNTER
I called and left message for her to call back about the canceled cardioversion due to not taking her anticoagulation-maybe she could have a CHANDANA and cardioversion with one of my partners   Migraine

## 2024-06-26 ENCOUNTER — TELEPHONE (OUTPATIENT)
Dept: CARDIOLOGY | Facility: CLINIC | Age: 59
End: 2024-06-26
Payer: OTHER GOVERNMENT

## 2024-06-26 DIAGNOSIS — I48.92 ATRIAL FLUTTER WITH CONTROLLED RESPONSE: Primary | ICD-10-CM

## 2024-06-26 NOTE — TELEPHONE ENCOUNTER
Notified pt. She was very happy to hear that Dr. Marques would do her procedures.    Thank you,    Amara Hensley, RN  Triage [facility]  06/26/24 15:10 EDT

## 2024-06-26 NOTE — TELEPHONE ENCOUNTER
Dr. Marques,    Pt said you recommended a provider to do patient's CHANDANA and cardioversion a while back. She called back today and asked if you could tell her the name of whom you recommended.    Thank you,    Amara Hensley RN  Triage [facility] [place]  06/26/24 14:47 EDT

## 2024-06-26 NOTE — TELEPHONE ENCOUNTER
I called and left a message for her to call back.  I would be fine with any of my partners doing her CHANDANA and cardioversion.    ----- Message from Martina SHAFER sent at 6/26/2024  1:52 PM EDT -----  Regarding: FW: Cardio version  Contact: [phone]    ----- Message -----  From: Whitney Schofield  Sent: 6/25/2024   7:35 PM EDT  To: Martha Osuna [facility]  Subject: Cardio version                                   FYI.  I've actually been dealing with the AFib continuously since November of last year.  Before that it was on and off.

## 2024-06-26 NOTE — TELEPHONE ENCOUNTER
Notified Harleen in scheduling. She verbalized understanding.    Thank you,    Amara Hensley, RN  [place] [facility] [place]  06/26/24 15:06 EDT

## 2024-07-03 ENCOUNTER — TELEPHONE (OUTPATIENT)
Dept: CARDIOLOGY | Facility: CLINIC | Age: 59
End: 2024-07-03
Payer: OTHER GOVERNMENT

## 2024-07-03 NOTE — TELEPHONE ENCOUNTER
Patient called and stated that her CHANDANA and cardioversion has been cancelled again (this is the 2nd time).  She stated that she has been in afib for 8 months and she wanted to know what percentage will this work?  Should she still do it?  Please advise.    CB: [phone]    Arnold

## 2024-07-18 ENCOUNTER — ANESTHESIA EVENT (OUTPATIENT)
Dept: POSTOP/PACU | Facility: HOSPITAL | Age: 59
End: 2024-07-18
Payer: OTHER GOVERNMENT

## 2024-07-18 ENCOUNTER — ANESTHESIA (OUTPATIENT)
Dept: POSTOP/PACU | Facility: HOSPITAL | Age: 59
End: 2024-07-18
Payer: OTHER GOVERNMENT

## 2024-07-18 ENCOUNTER — HOSPITAL ENCOUNTER (OUTPATIENT)
Dept: POSTOP/PACU | Facility: HOSPITAL | Age: 59
Discharge: HOME | End: 2024-07-18
Payer: OTHER GOVERNMENT

## 2024-07-18 VITALS — OXYGEN SATURATION: 100 % | SYSTOLIC BLOOD PRESSURE: 102 MMHG | DIASTOLIC BLOOD PRESSURE: 61 MMHG | HEART RATE: 70 BPM

## 2024-07-18 VITALS
RESPIRATION RATE: 16 BRPM | OXYGEN SATURATION: 99 % | SYSTOLIC BLOOD PRESSURE: 132 MMHG | TEMPERATURE: 98.4 F | HEART RATE: 64 BPM | DIASTOLIC BLOOD PRESSURE: 76 MMHG

## 2024-07-18 DIAGNOSIS — I48.92 ATRIAL FLUTTER WITH CONTROLLED RESPONSE: ICD-10-CM

## 2024-07-18 LAB
ANION GAP SERPL CALCULATED.3IONS-SCNC: 7.9 MMOL/L (ref 5–15)
BH CV ECHO MEAS - RAP SYSTOLE: 8 MMHG
BH CV ECHO MEAS - RVSP: 20 MMHG
BH CV ECHO MEAS - TR MAX PG: 12 MMHG
BH CV ECHO SHUNT ASSESSMENT PERFORMED (HIDDEN SCRIPTING): 1
BUN SERPL-MCNC: 16 MG/DL (ref 6–20)
BUN/CREAT SERPL: 16.8 (ref 7–25)
CALCIUM SPEC-SCNC: 8.1 MG/DL (ref 8.6–10.5)
CHLORIDE SERPL-SCNC: 110 MMOL/L (ref 98–107)
CO2 SERPL-SCNC: 24.1 MMOL/L (ref 22–29)
CREAT SERPL-MCNC: 0.95 MG/DL (ref 0.57–1)
EGFRCR SERPLBLD CKD-EPI 2021: 69.2 ML/MIN/1.73
GLUCOSE SERPL-MCNC: 99 MG/DL (ref 65–99)
LV EF 2D ECHO EST: 50 %
POTASSIUM SERPL-SCNC: 4.1 MMOL/L (ref 3.5–5.2)
QT INTERVAL: 545 MS
QT INTERVAL: 552 MS
QTC INTERVAL: 545 MS
QTC INTERVAL: 552 MS
SODIUM SERPL-SCNC: 142 MMOL/L (ref 136–145)

## 2024-07-18 PROCEDURE — 93320 DOPPLER ECHO COMPLETE: CPT

## 2024-07-18 PROCEDURE — 80048 BASIC METABOLIC PNL TOTAL CA: CPT | Performed by: INTERNAL MEDICINE

## 2024-07-18 PROCEDURE — 93312 ECHO TRANSESOPHAGEAL: CPT

## 2024-07-18 PROCEDURE — 93005 ELECTROCARDIOGRAM TRACING: CPT | Performed by: INTERNAL MEDICINE

## 2024-07-18 PROCEDURE — 25010000002 PROPOFOL 10 MG/ML EMULSION: Performed by: REGISTERED NURSE

## 2024-07-18 PROCEDURE — 92960 CARDIOVERSION ELECTRIC EXT: CPT

## 2024-07-18 PROCEDURE — 25810000003 LACTATED RINGERS PER 1000 ML: Performed by: REGISTERED NURSE

## 2024-07-18 PROCEDURE — 93325 DOPPLER ECHO COLOR FLOW MAPG: CPT

## 2024-07-18 RX ORDER — PROPOFOL 10 MG/ML
VIAL (ML) INTRAVENOUS AS NEEDED
Status: DISCONTINUED | OUTPATIENT
Start: 2024-07-18 | End: 2024-07-18 | Stop reason: SURG

## 2024-07-18 RX ORDER — ONDANSETRON 2 MG/ML
4 INJECTION INTRAMUSCULAR; INTRAVENOUS ONCE AS NEEDED
Status: DISCONTINUED | OUTPATIENT
Start: 2024-07-18 | End: 2024-07-19 | Stop reason: HOSPADM

## 2024-07-18 RX ORDER — DROPERIDOL 2.5 MG/ML
0.62 INJECTION, SOLUTION INTRAMUSCULAR; INTRAVENOUS
Status: DISCONTINUED | OUTPATIENT
Start: 2024-07-18 | End: 2024-07-19 | Stop reason: HOSPADM

## 2024-07-18 RX ORDER — FLUMAZENIL 0.1 MG/ML
0.2 INJECTION INTRAVENOUS AS NEEDED
Status: DISCONTINUED | OUTPATIENT
Start: 2024-07-18 | End: 2024-07-19 | Stop reason: HOSPADM

## 2024-07-18 RX ORDER — DIPHENHYDRAMINE HYDROCHLORIDE 50 MG/ML
12.5 INJECTION INTRAMUSCULAR; INTRAVENOUS
Status: DISCONTINUED | OUTPATIENT
Start: 2024-07-18 | End: 2024-07-19 | Stop reason: HOSPADM

## 2024-07-18 RX ORDER — NALOXONE HCL 0.4 MG/ML
0.2 VIAL (ML) INJECTION AS NEEDED
Status: DISCONTINUED | OUTPATIENT
Start: 2024-07-18 | End: 2024-07-19 | Stop reason: HOSPADM

## 2024-07-18 RX ORDER — LIDOCAINE HYDROCHLORIDE 10 MG/ML
0.5 INJECTION, SOLUTION INFILTRATION; PERINEURAL ONCE AS NEEDED
Status: CANCELLED | OUTPATIENT
Start: 2024-07-18

## 2024-07-18 RX ORDER — SODIUM CHLORIDE 0.9 % (FLUSH) 0.9 %
3 SYRINGE (ML) INJECTION EVERY 12 HOURS SCHEDULED
Status: CANCELLED | OUTPATIENT
Start: 2024-07-18

## 2024-07-18 RX ORDER — SODIUM CHLORIDE, SODIUM LACTATE, POTASSIUM CHLORIDE, CALCIUM CHLORIDE 600; 310; 30; 20 MG/100ML; MG/100ML; MG/100ML; MG/100ML
9 INJECTION, SOLUTION INTRAVENOUS CONTINUOUS
Status: CANCELLED | OUTPATIENT
Start: 2024-07-18

## 2024-07-18 RX ORDER — LIDOCAINE HYDROCHLORIDE 20 MG/ML
INJECTION, SOLUTION INFILTRATION; PERINEURAL AS NEEDED
Status: DISCONTINUED | OUTPATIENT
Start: 2024-07-18 | End: 2024-07-18 | Stop reason: SURG

## 2024-07-18 RX ORDER — PROMETHAZINE HYDROCHLORIDE 25 MG/1
25 SUPPOSITORY RECTAL ONCE AS NEEDED
Status: DISCONTINUED | OUTPATIENT
Start: 2024-07-18 | End: 2024-07-19 | Stop reason: HOSPADM

## 2024-07-18 RX ORDER — SODIUM CHLORIDE, SODIUM LACTATE, POTASSIUM CHLORIDE, CALCIUM CHLORIDE 600; 310; 30; 20 MG/100ML; MG/100ML; MG/100ML; MG/100ML
INJECTION, SOLUTION INTRAVENOUS CONTINUOUS PRN
Status: DISCONTINUED | OUTPATIENT
Start: 2024-07-18 | End: 2024-07-18 | Stop reason: SURG

## 2024-07-18 RX ORDER — SODIUM CHLORIDE 0.9 % (FLUSH) 0.9 %
3-10 SYRINGE (ML) INJECTION AS NEEDED
Status: CANCELLED | OUTPATIENT
Start: 2024-07-18

## 2024-07-18 RX ORDER — PROMETHAZINE HYDROCHLORIDE 25 MG/1
25 TABLET ORAL ONCE AS NEEDED
Status: DISCONTINUED | OUTPATIENT
Start: 2024-07-18 | End: 2024-07-19 | Stop reason: HOSPADM

## 2024-07-18 RX ADMIN — LIDOCAINE HYDROCHLORIDE 100 MG: 20 INJECTION, SOLUTION INFILTRATION; PERINEURAL at 07:35

## 2024-07-18 RX ADMIN — PROPOFOL 20 MG: 10 INJECTION, EMULSION INTRAVENOUS at 07:50

## 2024-07-18 RX ADMIN — SODIUM CHLORIDE, POTASSIUM CHLORIDE, SODIUM LACTATE AND CALCIUM CHLORIDE: 600; 310; 30; 20 INJECTION, SOLUTION INTRAVENOUS at 07:30

## 2024-07-18 RX ADMIN — PROPOFOL 30 MG: 10 INJECTION, EMULSION INTRAVENOUS at 07:37

## 2024-07-18 RX ADMIN — PROPOFOL 70 MG: 10 INJECTION, EMULSION INTRAVENOUS at 07:35

## 2024-07-18 RX ADMIN — PROPOFOL 20 MG: 10 INJECTION, EMULSION INTRAVENOUS at 07:44

## 2024-07-18 RX ADMIN — PROPOFOL 20 MG: 10 INJECTION, EMULSION INTRAVENOUS at 07:41

## 2024-07-18 RX ADMIN — PROPOFOL 20 MG: 10 INJECTION, EMULSION INTRAVENOUS at 07:47

## 2024-07-18 RX ADMIN — PROPOFOL 20 MG: 10 INJECTION, EMULSION INTRAVENOUS at 07:53

## 2024-07-18 RX ADMIN — PROPOFOL 20 MG: 10 INJECTION, EMULSION INTRAVENOUS at 07:39

## 2024-07-18 NOTE — ANESTHESIA POSTPROCEDURE EVALUATION
Patient: Whitney Schofield    Procedure Summary       Date: 07/18/24 Room / Location: [facility] PACU    Anesthesia Start: 0730 Anesthesia Stop: 0801    Procedures:       CARDIOVERSION EXTERNAL IN CARDIOLOGY DEPARTMENT      ADULT TRANSESOPHAGEAL ECHO (CHANDANA) W/ CONT IF NECESSARY PER PROTOCOL Diagnosis:       Atrial flutter with controlled response      (atrial fib)      (Arrhythmia)    Scheduled Providers: Lion Marques MD Provider: Rosalee Mendez MD    Anesthesia Type: MAC ASA Status: 4            Anesthesia Type: MAC    Vitals  Vitals Value Taken Time   /79 07/18/24 0830   Temp     Pulse 62 07/18/24 0831   Resp 18 07/18/24 0830   SpO2 98 % 07/18/24 0831   Vitals shown include unfiled device data.        Anesthesia Post Evaluation

## 2024-07-18 NOTE — ANESTHESIA PREPROCEDURE EVALUATION
Anesthesia Evaluation     Patient summary reviewed                Airway   Mallampati: III  Possible difficult intubation  Dental          Pulmonary - normal exam   (+) asthma,shortness of breath, sleep apnea on CPAP  (-) not a smoker  Cardiovascular     ECG reviewed  Rhythm: irregular    (+) hypertension, dysrhythmias Paroxysmal Atrial Fib, Atrial Fib, Atrial Flutter    ROS comment: Reviewed echo:    ·  Left ventricular systolic function is low normal. Calculated left ventricular EF = 59.4%; visually estimated LVEF 50-55%.  Septal bounce noted in the setting of ventricular pacing.  ·  The left ventricular cavity is mildly dilated.  ·  Left ventricular diastolic function was indeterminate.  ·  S/p tricuspid valve annuloplasty.  No significant functional stenosis noted, mean PG 2 mmHg at HR 67 bpm.  ·  Estimated right ventricular systolic pressure from tricuspid regurgitation is normal (<35 mmHg).  ·  History of secundum ASD patch repair.  No obvious evidence of residual shunt on Doppler.  ·  The left atrial cavity is severely dilated.  Normal RA and IVC size.      Neuro/Psych  (+) headaches  GI/Hepatic/Renal/Endo    (+) obesity, thyroid problem hypothyroidism    Musculoskeletal     Abdominal    Substance History      OB/GYN          Other   arthritis, autoimmune disease rheumatoid arthritis,   history of cancer                  Anesthesia Plan    ASA 4     MAC       Anesthetic plan, risks, benefits, and alternatives have been provided, discussed and informed consent has been obtained with: patient.      CODE STATUS:

## 2024-08-22 ENCOUNTER — OFFICE VISIT (OUTPATIENT)
Dept: CARDIOLOGY | Facility: CLINIC | Age: 59
End: 2024-08-22
Payer: OTHER GOVERNMENT

## 2024-08-22 VITALS
HEIGHT: 65 IN | SYSTOLIC BLOOD PRESSURE: 158 MMHG | DIASTOLIC BLOOD PRESSURE: 84 MMHG | HEART RATE: 81 BPM | WEIGHT: 204 LBS | BODY MASS INDEX: 33.99 KG/M2

## 2024-08-22 DIAGNOSIS — I48.0 PAROXYSMAL ATRIAL FIBRILLATION: ICD-10-CM

## 2024-08-22 DIAGNOSIS — I48.19 PERSISTENT ATRIAL FIBRILLATION: ICD-10-CM

## 2024-08-22 DIAGNOSIS — I42.8 NONISCHEMIC CARDIOMYOPATHY: Primary | ICD-10-CM

## 2024-08-22 DIAGNOSIS — Z95.0 PRESENCE OF CARDIAC PACEMAKER: ICD-10-CM

## 2024-08-22 DIAGNOSIS — I10 PRIMARY HYPERTENSION: ICD-10-CM

## 2024-08-22 PROCEDURE — 93000 ELECTROCARDIOGRAM COMPLETE: CPT | Performed by: INTERNAL MEDICINE

## 2024-08-22 PROCEDURE — 99214 OFFICE O/P EST MOD 30 MIN: CPT | Performed by: INTERNAL MEDICINE

## 2024-08-22 RX ORDER — METOPROLOL TARTRATE 50 MG/1
50 TABLET, FILM COATED ORAL 2 TIMES DAILY
Qty: 270 TABLET | Refills: 3 | Status: SHIPPED | OUTPATIENT
Start: 2024-08-22

## 2024-08-22 NOTE — PROGRESS NOTES
Date of Office Visit: 05/15/2024  Encounter Provider: Yennifer Viera MD  Place of Service: [facility] CARDIOLOGY  Patient Name: Whitney Schofield  :1965      Patient ID:  Whitney Schofield is a 58 y.o. 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     The patient originally presented to [facility] with symptoms of palpitations and  shortness of breath in 2009. She subsequently was diagnosed with Hashimoto  thyroiditis, and underwent a partial thyroidectomy in 2010. As part of a routine  follow-up, she had a two-dimensional echocardiogram performed on 3/1/2010 which   revealed a severely dilated right ventricle and significant pulmonary hypertension. A CT   scan of her chest did not show any evidence for pulmonary embolus or other acute lung   pathology.  She underwent a transesophageal echocardiogram on 3/16/2010 which   revealed a large secundum ASD high in the atrial septum. 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 She was referred to   Dr. Harper for evaluation to surgically repair the ASD, and underwent a preoperative   cardiac catheterization on 3/26/2010. This revealed angiographically normal coronary   arteries, with a Qp:Qs of 2.3, consistent with a large shunt. The patient underwent   closure of the ASD, as well as tricuspid valve repair with an annuloplasty ring by Dr. Harper at [facility]  [facility] on 05/06/2010. Her postoperative period was complicated by   complete AV block and accelerated junctional escape rhythm. She underwent   placement of a permanent pacemaker on 5/11/2010.      The patient continued to have significant shortness of breath and a 24-hour Holter   monitor and two-dimensional echocardiogram were obtained on 5/12/2011. Her   Holter monitor did show several brief episodes of tachycardia. The echocardiogram   did not show any significant findings other than a \"shuttering motion\" of the septum.   She underwent a right and left heart catheterization on 7/20/2011 to assess for any   evidence of constriction. There was no evidence of restriction or constriction on the   heart catheterization, although she did have pulmonary hypertension with a mean   pulmonary artery pressure of 30 mmHg. A transesophageal echocardiogram was also   performed on 6/20/2011 which revealed an ejection fraction of 55-60%, and an intact   ASD repair. An electrophysiology study was performed by Dr. Foster on 8/1/2011.   This was performed through her pacemaker in conjunction with an isoproterenol   infusion. This showed no inducible tachycardia or ventricular tachycardia.       The patient eventually wore a 21-day event recorder and was noted to go into   wide-complex tachycardia on 9/30/2012. She had multiple other episodes as well.   Dr. Aguilar from Electrophysiology saw the patient and felt this represented ventricular   tachycardia. She did ultimately undergo a repeat electrophysiology study on   11/20/2012 by Dr. Aguilar which confirmed complete infra-hisian block with no   conduction retrograde or antegrade. No ventricular tachycardia was induced. She   was started on beta blockers at that time.       At the patient's visit in 3/2013 she complained of severe volume retention and edema.    She was also more short of breath and had gained a significant amount of weight.   She was admitted to the hospital on " 3/25/2013 and underwent a transesophageal   echocardiogram.  Her ejection fraction had decreased significantly to 35%, and there   was an extensive amount of thrombus formation on her pacemaker wires. Her ASD   repair appeared to be in good condition. She did undergo a CT scan of the chest   which showed no evidence for pulmonary embolism, as well as a CT scan of the   abdomen which showed no evidence for inferior vena cava obstruction or clotting.   She was diuresed extensively and placed on Coumadin at that time. 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 She ultimately underwent left   ventricular lead placement and pacemaker replacement on 8/19/2013 by Dr. Aguilar.   She then had right atrial lead dislodgement and diaphragmatic stimulation. A right   atrial lead revision was performed on 2/19/2014; however, the patient developed a   significant left subclavian vein thrombosis post procedurally. She again was placed   on Coumadin at that time, which was then switched to Xarelto. She ultimately   underwent laser lead extraction of the abandoned right atrial lead on 4/17/2014. She   did undergo a right heart cath on 4/13/2015 which showed only mild pulmonary   hypertension (mean PA pressure 29 mm Hg).     She is , has 2 children, works as a , has never smoked, uses no alcohol and has 1 cup of coffee per day.      Echo done 6/20/2022 shows ejection fraction of 51-55% with septal wall motion abnormality consistent with RV pacing, grade 1 diastolic dysfunction, mildly dilated left atrium with mild tricuspid insufficiency, no pericardial effusion and normal RVSP.  She had a nonischemic stress nuclear perfusion study done 10/10/2018.  She had normal bilateral lower extremity venous duplex study done 1/26/2022.      Transthoracic echocardiogram done 6/12/2024 showed ejection fraction of 50-55% with mild left ventricular dilation, tricuspid annuloplasty which was normal, normal  RVSP, no shunt on Doppler across the intra-atrial septum, severe left atrial enlargement.  Transesophageal echocardiogram done 7/18/2024 showed mildly dilated left ventricle with ejection fraction 50%, normal right ventricular size and function, moderate to severe left atrial enlargement, no left atrial appendage thrombus, normal saline study-s/p ASD patch repair, mild mitral insufficiency, annuloplasty ring in the tricuspid position with trace to mild insufficiency, normal RVSP.    She underwent cardioversion 7/18/2024 converting back into normal sinus rhythm.  Still in sinus rhythm today.  She has had no dizziness, syncope or falls.  She has no chest pain or pressure.  She does feel her heart palpitating sometimes still.  She has no orthopnea or PND.  She has no exertional dyspnea.  She is taking her medications as directed without difficulty.    Past Medical History:   Diagnosis Date    Anemia     ASD (atrial septal defect)     Asthma     Asystole     Post ASD closure asystole and high-grade AV block.  s/p pacemaker 5/11/10.    Celiac disease     DVT (deep venous thrombosis)     Left subclavian DVT following right atrial pacemaker lead revision in 2/14    Edema     Hashimoto's thyroiditis     s/p partial thyroidectomy 1/13/10    Heart palpitations     Hx of thyroid cancer     Malignant neoplasm of thyroid gland     Papillary carcinoma of the thyroid - s/p residual thyroidectomy in 2/11.    Migraine     Nonischemic cardiomyopathy     EF as low as 35% 3/13.  EF 6/21/13 by CHANDANA was 50-55%, and 53% by echo on 6/17/14.  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  EF 50% by CHANDANA on 4/18/16.    NSVT (nonsustained ventricular tachycardia)     Noted by event recorder 9/12.  EP study by Dr. Aguilar on 11/20/12 showed no inducible VT.    LUKAS on CPAP     Papillary thyroid carcinoma 08/27/2013    Rheumatoid arthritis     Severe and debilitating     Secundum ASD     s/p open closure with a PeriPatch xenograft by   [place] on 5/6/10 by Dr. Harper at [facility].    Sleep apnea     SOB (shortness of breath)     Multifactorial     Thrombus due to any device, implant or graft     Extensive thrombus formation on the pacemaker leads by CHANDANA 3/25/13.  Initiated on Coumadin transiently at that time - improved significantly by CHANDANA 6/21/13.         Past Surgical History:   Procedure Laterality Date    ASD AND VSD REPAIR      CARDIAC ELECTROPHYSIOLOGY PROCEDURE N/A 1/18/2021    Procedure: Generator Change BI Ventriculat PPM- Medtronic;  Surgeon: Jose Mcdowell MD;  Location: [facility] CATH INVASIVE LOCATION;  Service: Cardiology;  Laterality: N/A;    CHOLECYSTECTOMY  2007    ENDOSCOPY AND COLONOSCOPY  12/30/2015    Tom Orta MD    EXCISION MASS TRUNK Right 7/20/2018    Procedure: EXCISION MASS TRUNK, RUQ ABD WALL LIPOMA;  Surgeon: Eduar Marx MD;  [place] [facility] [place] [facility] [place]: [place]    PACEMAKER IMPLANTATION  05/2010    Inital dual-chamber pacemaker placed 5/11/10 after ASD closure surgery.  Had LV lead placement and generator change on 8/19/13 by Dr. Aguilar (Medtronic #[id number]).  Then had right atrial lead revision on 2/19/14 (for right atrial lead dislodgement and diaphragmatic stimulation) - developed left subclavian DVT afterwards.  Then had laser lead extraction of the abandoned right atrial lead on 4/17/14.    REPLACEMENT TOTAL KNEE BILATERAL      SHOULDER SURGERY Right     THYROID LOBECTOMY Right 2012    THYROIDECTOMY Left 2011    Dr. Coronado, radioactive iodine X3    TONSILLECTOMY      TRICUSPID VALVE SURGERY      #32 MC3 annuloplasty ring by Dr. Harper on 5/6/10 (at time of ASD closure)       Current Outpatient Medications on File Prior to Visit   Medication Sig Dispense Refill    Actemra ACTPen 162 MG/0.9ML solution auto-injector injection Every 14 (Fourteen) Days.      bumetanide (BUMEX) 0.5 MG tablet Take 2 tablets by mouth Daily As Needed (swelling). 90 tablet 1    Ferrous Sulfate Dried 200 (65 FE) MG  tablet Take 1 tablet by mouth Daily.      folic acid (FOLVITE) 1 MG tablet Take 1 tablet by mouth Daily.      HYDROcodone-acetaminophen (NORCO)  MG per tablet Take 1 tablet by mouth Every 6 (Six) Hours As Needed.      ipratropium-albuterol (DUO-NEB) 0.5-2.5 mg/3 ml nebulizer Take 3 mL by nebulization Every 4 (Four) Hours As Needed for Wheezing or Shortness of Air. 360 mL 0    leflunomide (ARAVA) 20 MG tablet Take 1 tablet by mouth Daily.      levothyroxine (SYNTHROID, LEVOTHROID) 175 MCG tablet Take 1 tablet by mouth Daily.      methotrexate 2.5 MG tablet Take 8 tablets by mouth 1 (One) Time Per Week.      metoprolol tartrate (LOPRESSOR) 25 MG tablet Take 2 tablets by mouth 2 (Two) Times a Day. 180 tablet 3    omeprazole (priLOSEC) 40 MG capsule Take 1 capsule by mouth Daily.      pantoprazole (PROTONIX) 40 MG EC tablet Take 1 tablet by mouth Daily.      rivaroxaban (XARELTO) 20 MG tablet Take 1 tablet by mouth Daily With Dinner. 30 tablet 1    valsartan (DIOVAN) 80 MG tablet Take 1 tablet by mouth Every Night. 90 tablet 3    vitamin D (ERGOCALCIFEROL) 20012 units capsule capsule Take 1 capsule by mouth 3 (Three) Times a Week.       No current facility-administered medications on file prior to visit.       Social History     Socioeconomic History    Marital status:     Number of children: 2   Tobacco Use    Smoking status: Never     Passive exposure: Never    Smokeless tobacco: Never   Vaping Use    Vaping status: Never Used   Substance and Sexual Activity    Alcohol use: No    Drug use: No    Sexual activity: Yes     Partners: Male             Procedures    ECG 12 Lead    Date/Time: 8/22/2024 1:24 PM  Performed by: Yennifer Viera MD    Authorized by: Yennifer Viera MD  Comparison: compared with previous ECG   Comparison to previous ECG: A flutter no longer seen  Pacing: dual chamber pacing  Clinical impression: non-specific ECG              Objective:      Vitals:    08/22/24 1258   BP:  "158/84   Pulse: 81   Weight: 92.5 kg (204 lb)   Height: 165.1 cm (65\")     Body mass index is 33.95 kg/m².    Vitals reviewed.   Constitutional:       General: Not in acute distress.     Appearance: Not diaphoretic.   Neck:      Vascular: No carotid bruit or JVD.   Pulmonary:      Effort: Pulmonary effort is normal.      Breath sounds: Normal breath sounds.   Cardiovascular:      Regular rhythm.      No gallop.    Pulses:     Intact distal pulses.      Carotid: 2+ bilaterally.     Radial: 2+ bilaterally.     Dorsalis pedis: 2+ bilaterally.     Posterior tibial: 2+ bilaterally.  Edema:     Peripheral edema absent.   Neurological:      Cranial Nerves: No cranial nerve deficit.         Lab Review:       Assessment:      Diagnosis Plan   1. Nonischemic cardiomyopathy        2. Primary hypertension        3. s/p Medtronic dual chamber ICD        4. Paroxysmal atrial fibrillation          Large secundum atrial septal defect status post patch repair in 2010  Tricuspid valve insufficiency- s/p tricuspid repair with annuloplasty ring in 2010  Pacemaker placed in 2010 due to complete AV block.  History of nonischemic cardiomyopathy due to chronic right ventricular pacing, resolved with upgrade to CRT pacing.    Rheumatoid arthritis, sees Dr. Ozuna.   Hashimoto's thyroiditis and history of thyroid cancer, follows with Dr. Bravo.   Palpitations due to PVCs in the past.  On metoprolol  Atrial flutter -s/p cardioversion 7/18/2024.  Hypertension, her goal is less than 120/80.     Plan:       See Kathy in 6 weeks.  Continue valsartan 80 mg nightly and increase metoprolol succinate to 50 mg in the morning and 100 mg nightly.  "

## 2024-10-02 ENCOUNTER — TELEPHONE (OUTPATIENT)
Dept: FAMILY MEDICINE CLINIC | Facility: CLINIC | Age: 59
End: 2024-10-02
Payer: OTHER GOVERNMENT

## 2024-10-02 ENCOUNTER — TELEPHONE (OUTPATIENT)
Age: 59
End: 2024-10-02

## 2024-10-02 NOTE — TELEPHONE ENCOUNTER
The pt's MDT CRTP remote report shows a sudden increase in thresholds in the atrial lead. This pt was followed previously by MD Mcdowell and already has a hx of high RV thresholds/impedances and high LV thresholds. Atrial thresholds had been resulting at 0.75V and now appear to go up to 1.75V @ 0.4ms (see graph). Her atrial impedances are WNL. Her atrial lead is set on adaptive. She has an appt scheduled to see RAHAT Cassidy on 10/9 along with device check.    Additionally, the pt has a hx of VT - on this report there was one VT-NS episodes lasting 18 beats at reported rate of 313 bpm - see strip below.     I just wanted to update you prior to pt's appt next week.     Kindly,   Meg Schotanus Device RN     VT-NS event on 8/29 09:21

## 2024-10-02 NOTE — TELEPHONE ENCOUNTER
NEEDS UPDATED REF PUT IN FOR DR ZEHRA HERNANDEZ ( RHEUMATOLOGY)        DR. JENN MORALES  ELECTROPHYSIOLOGIST WITH CARDIOLOGY AND  DR JAYLA DURAN     PATIENT HAS ALISE

## 2024-10-07 ENCOUNTER — TELEPHONE (OUTPATIENT)
Dept: CARDIOLOGY | Facility: CLINIC | Age: 59
End: 2024-10-07

## 2024-10-07 DIAGNOSIS — E06.3 HASHIMOTO'S THYROIDITIS: ICD-10-CM

## 2024-10-07 DIAGNOSIS — I48.0 PAROXYSMAL ATRIAL FIBRILLATION: Primary | ICD-10-CM

## 2024-10-07 DIAGNOSIS — M06.9 RHEUMATOID ARTHRITIS, INVOLVING UNSPECIFIED SITE, UNSPECIFIED WHETHER RHEUMATOID FACTOR PRESENT: ICD-10-CM

## 2024-10-07 NOTE — TELEPHONE ENCOUNTER
Caller: Whitney Schofield     Best call back number: [phone]     What is your medical concern? PT CALLING TO SEE IF SHE NEEDS TO RSD HER APPT ON 10.09.24 - PT HAS DEVICE CHECK AND FU BUT TESTED POSITIVE FOR COVID ON 10.02.24 - SHE WAS ADVISED ONCE HER FEVER BROKE AND SYMPTOMS EASED, SHE WAS OKAY TO RETURN TO WORK BUT SHE WASN'T SURE IF APPT NEEDS TO BE MOVED - PT REPORTS HER FEVER BROKE OVER THE WEEKEND AND OTHER THAN A STOPPED UP NOSE, SHE IS FEELING OKAY - IF APPT NEEDS TO BE RSD, PLEASE CALL TO ADVISE

## 2024-10-08 NOTE — TELEPHONE ENCOUNTER
10/08/2024    Patient was left a voicemail with the recommendations by the APRN.     Thanks  Bhargav

## 2024-10-09 ENCOUNTER — CLINICAL SUPPORT NO REQUIREMENTS (OUTPATIENT)
Age: 59
End: 2024-10-09
Payer: OTHER GOVERNMENT

## 2024-10-09 ENCOUNTER — OFFICE VISIT (OUTPATIENT)
Dept: CARDIOLOGY | Facility: CLINIC | Age: 59
End: 2024-10-09
Payer: OTHER GOVERNMENT

## 2024-10-09 VITALS
BODY MASS INDEX: 33.49 KG/M2 | DIASTOLIC BLOOD PRESSURE: 82 MMHG | HEIGHT: 65 IN | WEIGHT: 201 LBS | SYSTOLIC BLOOD PRESSURE: 140 MMHG

## 2024-10-09 DIAGNOSIS — Z95.0 PRESENCE OF CARDIAC PACEMAKER: ICD-10-CM

## 2024-10-09 DIAGNOSIS — Z95.0 PRESENCE OF BIVENTRICULAR CARDIAC PACEMAKER: Primary | ICD-10-CM

## 2024-10-09 DIAGNOSIS — I42.8 NONISCHEMIC CARDIOMYOPATHY: Primary | ICD-10-CM

## 2024-10-09 DIAGNOSIS — I48.0 PAROXYSMAL ATRIAL FIBRILLATION: ICD-10-CM

## 2024-10-09 DIAGNOSIS — I10 PRIMARY HYPERTENSION: ICD-10-CM

## 2024-10-09 NOTE — Clinical Note
I saw Ms. Schofield today and she was having increased palpitations- she had cv for a flutter on 7/18/24. She had device check this afternoon which showed increased pacing thresholds and I am not sure if that is significant for her--- way back in 2013 she had thrombus formation on her pacer wires and presented with acute HF, had revisions following.   Device check also shows 16% AF  She is having lightheadedness and dizziness.   Let me know what you think

## 2024-10-09 NOTE — TELEPHONE ENCOUNTER
Just wanted to update you, and let you know pt came in today 10/09/2024 after seeing MINA GIANG for device check. Pt has DDD MDT CRT-P. Her thresholds continue to be elevated.  RA Threshold today was pretty consistent at 1.75V@0.4ms, RV Threshold resulting  2V@0.4ms and LV Threshold resulting 2.5V@ 1ms from last manual testing result of 2.25V@ 1ms. Auto is on in RA and RV Leads, and seems to result slightly higher than manual testing.

## 2024-10-09 NOTE — PROGRESS NOTES
Date of Office Visit: 10/09/2024  Encounter Provider: RAHAT Hernandez  Place of Service: [facility] CARDIOLOGY  Established cardiologist: Yennifer Viera MD  Patient Name: Whitney Schofield  :1965      Patient ID:  Whitney Schofield is a 58 y.o. 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   She is , has 2 children, works as a , has never smoked, uses no alcohol and has 1 cup of coffee per day.     History of Present Illness  She was seen at [facility] 2009 complaints of palpitation and shortness of breath.  She was subsequently diagnosed with Hashimoto thyroiditis and underwent partial thyroidectomy 2010.    In 2010 she had a 2D echo for follow-up which showed a severely dilated RV and significant pulmonary hypertension.  CT scan at that time did not show any evidence of PE or acute lung pathology.  She had a CHANDANA 2010 which showed a large secundum ASD high in the atrial septum.  She was also noted to have a myxomatous tricuspid valve with moderate TR.  She was referred to Dr. Harper for surgical repair of ASD.  Preop cardiac catheterization 3/26/2010 showed normal coronary arteries and finding consistent with a large shunt.  She underwent closure of the ASD and tricuspid valve repair with annuloplasty ring with Dr. Harper at [facility] on May 6, 2010.  She developed a complete AV block postop with accelerated junctional escape rhythm and had a permanent pacemaker  placed May 11, 2010.    May 2011 she was having significant shortness of breath and a 24-hour Holter monitor showed brief episodes of tachycardia.  An echocardiogram showed shuttering motion of the septum.  She underwent a right and left heart cath July 20, 2011 to assess for any evidence of constriction and there was no evidence of restriction or constriction though she did have pulmonary hypertension with a mean pulmonary artery pressure of 30 mmHg. CHANDANA 6/20/2011 showed an EF of 55 to 60%, intact ASD repair.  EP study with Dr. Foster August 2011 showed no inducible tachycardia or VT.    2012 she wore a 21-day event recorder and was noted to have gone into wide-complex tachycardia.  She saw Dr. Aguilar with EP and was felt this was VT.  She ultimately underwent a repeat EP study November 2012 with Dr. Aguilar which confirmed a complete infrahisian block with no conduction retrograde or antegrade.  She was started on beta-blockers.    2013 developed severe edema shortness of breath and weight gain.  CHANDANA showed an EF of 35% and an extensive amount of thrombus formation on her pacemaker wires.  Her ASD repair was intact.  CT of the chest showed no PE, she was IV diuresed and placed on warfarin.  It was felt she could have a pacemaker induced cardiomyopathy.  She underwent LV lead placement and pacemaker replacement 8/19/2013.  She then had right atrial lead dislodgment and diaphragmatic stimulation.  A right atrial lead revision was done 2/19/2014.  However she developed significant left subclavian vein thrombosis postprocedure and was again placed on warfarin.  This was ultimately changed to rivaroxaban.  She ultimately underwent laser lead extraction of the abandoned right atrial lead 4/17/2014.  A right heart cath 4/13/2015 showed only mild pulmonary hypertension.    Echo done 6/20/2022 shows ejection fraction of 51-55% with septal wall motion abnormality consistent with RV pacing, grade 1 diastolic dysfunction, mildly  dilated left atrium with mild tricuspid insufficiency, no pericardial effusion and normal RVSP.  She had a nonischemic stress nuclear perfusion study done 10/10/2018.  She had normal bilateral lower extremity venous duplex study done 1/26/2022.       TTE done June 12, 2024 with a EF 50 to 55%, mild LV dilation, tricuspid annuloplasty which was normal, normal RVSP, no shunt on Doppler across the interatrial septum, severe left atrial enlargement.  CHANDANA 7/18/2024 showed mildly dilated LV with EF 50%, normal RV size and function, moderate to severe left atrial enlargement, no left atrial appendage thrombus, normal saline study status post ASD patch repair, mild mitral insufficiency, annuloplasty ring in the tricuspid position with trace to mild insufficiency, normal RVSP.    7/18/2024 underwent cardioversion for A flutter converting back into normal sinus rhythm.  She saw Dr. Viera for follow-up in the office 8/22/2024.  Her metoprolol succinate was increased to 50 mg in the morning and 100 mg nightly.    She was diagnosed with COVID-19 10/4/2024.  She did have some shortness of breath with this.    Today she is feeling better from COVID. she is having frequent episodes of tachycardia, palpitations.  She has noticed both low and high blood pressures.  A systolic of 170 a couple days ago.  But she is also having  lightheadedness and dizziness with hypotension.  There has not been any presyncope/syncope.  There is no leg swelling.  She has had some mild shortness of breath, no AMBROSE, PND. There is no chest pain or pressure.  She is scheduled for a device check after this appointment today.    Current Outpatient Medications on File Prior to Visit   Medication Sig Dispense Refill    Actemra ACTPen 162 MG/0.9ML solution auto-injector injection Every 14 (Fourteen) Days.      bumetanide (BUMEX) 0.5 MG tablet Take 2 tablets by mouth Daily As Needed (swelling). 90 tablet 1    Ferrous Sulfate Dried 200 (65 FE) MG tablet Take 1  "tablet by mouth Daily.      folic acid (FOLVITE) 1 MG tablet Take 1 tablet by mouth Daily.      HYDROcodone-acetaminophen (NORCO)  MG per tablet Take 1 tablet by mouth Every 6 (Six) Hours As Needed.      ipratropium-albuterol (DUO-NEB) 0.5-2.5 mg/3 ml nebulizer Take 3 mL by nebulization Every 4 (Four) Hours As Needed for Wheezing or Shortness of Air. 360 mL 0    leflunomide (ARAVA) 20 MG tablet Take 1 tablet by mouth Daily.      levothyroxine (SYNTHROID, LEVOTHROID) 175 MCG tablet Take 1 tablet by mouth Daily.      methotrexate 2.5 MG tablet Take 8 tablets by mouth 1 (One) Time Per Week.      metoprolol tartrate (LOPRESSOR) 50 MG tablet Take 1 tablet by mouth 2 (Two) Times a Day. Take 50mg in am and 100mg in pm 270 tablet 3    omeprazole (priLOSEC) 40 MG capsule Take 1 capsule by mouth Daily.      pantoprazole (PROTONIX) 40 MG EC tablet Take 1 tablet by mouth Daily.      rivaroxaban (XARELTO) 20 MG tablet Take 1 tablet by mouth Daily With Dinner. 30 tablet 1    valsartan (DIOVAN) 80 MG tablet Take 1 tablet by mouth Every Night. 90 tablet 3    vitamin D (ERGOCALCIFEROL) 90357 units capsule capsule Take 1 capsule by mouth 3 (Three) Times a Week.       No current facility-administered medications on file prior to visit.         Objective:      Vitals:    10/09/24 1420   BP: 140/82   Weight: 91.2 kg (201 lb)   Height: 165.1 cm (65\")     Body mass index is 33.45 kg/m².  Wt Readings from Last 3 Encounters:   10/09/24 91.2 kg (201 lb)   08/22/24 92.5 kg (204 lb)   06/12/24 93 kg (205 lb 0.4 oz)         Constitutional:       Appearance: Healthy appearance. Not in distress.   Pulmonary:      Effort: Pulmonary effort is normal.      Breath sounds: Normal breath sounds.   Cardiovascular:      Normal rate. Regular rhythm.      No gallop.  No click. No rub.   Pulses:     Radial: 2+ bilaterally.     Dorsalis pedis: 2+ bilaterally.     Posterior tibial: 2+ bilaterally.  Edema:     Peripheral edema absent.   Skin:     " "General: Skin is warm and dry.   Neurological:      Mental Status: Alert and oriented to person, place and time.       Lab Review:      Lab Results   Component Value Date    TSH 0.011 (L) 07/14/2021       Lab Results   Component Value Date    CHOL 173 03/04/2021     Lab Results   Component Value Date    TRIG 84 03/04/2021     Lab Results   Component Value Date    HDL 49 03/04/2021     Lab Results   Component Value Date     (H) 03/04/2021       Lab Results   Component Value Date    WBC 5.32 06/21/2024    HGB 12.2 06/21/2024    HCT 38.8 06/21/2024    MCV 85.3 06/21/2024     06/21/2024       Lab Results   Component Value Date    GLUCOSE 99 07/18/2024    BUN 16 07/18/2024    CREATININE 0.95 07/18/2024    EGFR 69.2 07/18/2024    BCR 16.8 07/18/2024    K 4.1 07/18/2024    CO2 24.1 07/18/2024    CALCIUM 8.1 (L) 07/18/2024    ALBUMIN 4.0 11/30/2022    BILITOT 0.5 11/30/2022    AST 15 05/01/2024    ALT 12 05/01/2024       No results found for: \"HGBA1C\"    Assessment:     1. Nonischemic cardiomyopathy    2. Paroxysmal atrial fibrillation    3. s/p Medtronic dual chamber ICD    4. Primary hypertension      NICM due to chronic RV pacing, thrombus on pacing wires presented with acute HF in 2013-resolved with upgrade to CRT pacing.  No acute HF  PAF, atrial flutter, status post cardioversion 7/18/2024.  In regular rhythm today on exam.  She is experiencing palpitations and heart racing.  She is having her device check today.   Complete AV block status post ppm 2010, EP study 2012 with complete infra -hisian block. LV lead placement and pacemaker replacement 8/2013.  Right atrial lead revision 2/2014.  Laser lead extraction of right atrial lead 4/2014.  Hypertension, she is having labile readings at home.  Tricuspid valve insufficiency status post tricuspid repair with annuloplasty ring in 2010  Large secundum ASD status post patch repair in 2010  Rheumatoid arthritis, sees Dr. Ozuna.   Hashimoto's thyroiditis " and history of thyroid cancer, follows with Dr. Bravo.   COVID-19 infection 10/4/2024    Plan:   No medication changes were made  We are going to see what her device check shows and go from there   Will review with EP if she is having incidence of A-fib/flutter  Return in about 6 months (around 4/9/2025) for Dr. Viera.        Thank you for allowing me to participate in this patient's care. Please call with any questions or concerns.          Dragon dictation device was utilized in this note.

## 2024-10-15 ENCOUNTER — TELEPHONE (OUTPATIENT)
Dept: CARDIOLOGY | Facility: CLINIC | Age: 59
End: 2024-10-15

## 2024-10-15 NOTE — TELEPHONE ENCOUNTER
Caller: Whitney Schofield    Relationship to patient: Self    Best call back number: [phone] AND CAN LVM    Chief complaint: FU    Type of visit: FU    Requested date: NOV 5TH- EVENING TIME    Additional notes:RECENT TE STATES THAT SOMEONE WOULD CALL PT TO GET AN APPT SET UP WITH HER EP. NO INSTRUCTION FOR HUB TO SCHEDULE ANYTHING WITH EP.

## 2024-10-16 DIAGNOSIS — I48.19 PERSISTENT ATRIAL FIBRILLATION: ICD-10-CM

## 2024-10-16 DIAGNOSIS — I42.8 NONISCHEMIC CARDIOMYOPATHY: ICD-10-CM

## 2024-10-17 RX ORDER — METOPROLOL TARTRATE 25 MG/1
50 TABLET, FILM COATED ORAL 2 TIMES DAILY
Qty: 360 TABLET | Refills: 3 | OUTPATIENT
Start: 2024-10-17

## 2024-11-11 ENCOUNTER — CLINICAL SUPPORT NO REQUIREMENTS (OUTPATIENT)
Age: 59
End: 2024-11-11
Payer: OTHER GOVERNMENT

## 2024-11-11 ENCOUNTER — OFFICE VISIT (OUTPATIENT)
Age: 59
End: 2024-11-11
Payer: OTHER GOVERNMENT

## 2024-11-11 VITALS
WEIGHT: 204 LBS | DIASTOLIC BLOOD PRESSURE: 84 MMHG | HEIGHT: 65 IN | SYSTOLIC BLOOD PRESSURE: 128 MMHG | BODY MASS INDEX: 33.99 KG/M2 | HEART RATE: 67 BPM

## 2024-11-11 DIAGNOSIS — I48.19 PERSISTENT ATRIAL FIBRILLATION: Primary | ICD-10-CM

## 2024-11-11 DIAGNOSIS — Z98.890 H/O TRICUSPID VALVE REPAIR: ICD-10-CM

## 2024-11-11 DIAGNOSIS — I42.8 NONISCHEMIC CARDIOMYOPATHY: ICD-10-CM

## 2024-11-11 DIAGNOSIS — Z95.0 PRESENCE OF CARDIAC PACEMAKER: ICD-10-CM

## 2024-11-11 DIAGNOSIS — I42.8 NONISCHEMIC CARDIOMYOPATHY: Primary | ICD-10-CM

## 2024-11-11 PROCEDURE — 99214 OFFICE O/P EST MOD 30 MIN: CPT | Performed by: PHYSICIAN ASSISTANT

## 2024-11-11 PROCEDURE — 93000 ELECTROCARDIOGRAM COMPLETE: CPT | Performed by: PHYSICIAN ASSISTANT

## 2024-11-11 RX ORDER — METOPROLOL TARTRATE 25 MG/1
25 TABLET, FILM COATED ORAL 2 TIMES DAILY
Qty: 180 TABLET | Refills: 2 | Status: SHIPPED | OUTPATIENT
Start: 2024-11-11 | End: 2024-11-12 | Stop reason: SDUPTHER

## 2024-11-11 RX ORDER — VALSARTAN 80 MG/1
80 TABLET ORAL NIGHTLY
Qty: 90 TABLET | Refills: 3 | Status: SHIPPED | OUTPATIENT
Start: 2024-11-11

## 2024-11-11 NOTE — PROGRESS NOTES
ELECTROPHYSIOLOGY   Date of Office Visit: 2024  Patient Name: Whitney Schofield  : 1965  Encounter Provider: Darnell Triplett PA-C  Primary Cardiologist: Yennifer Viera MD  Electrophysiologist: Dr. Lizama and previously Dr. Aguilar  CHIEF COMPLAINT / REASON FOR OFFICE VISIT     NICM, persistent AFIB, Atrial Flutter, Sleep Apnea (/), and Pacemaker Check  6 month follow up    HISTORY OF PRESENT ILLNESS     This is a 58 y.o. year old female who presents to [facility] CARDIOLOGY for a for a 6 month follow up.     She was hx with a large ASD and myomatous TV in . She had a surgical repair of the ASD and tricuspid valve repair with annuloplasty by Dr. Harper 2010.     Post op, she had a complete AV block and had a dual chamber PPM placed.     In , she developed a cardiomyopathy and had a thrombus on her pacemaker wires that was treated with warfarin. She had a decline in EF at the time and an LV lead was added by Dr. Aguilar 2013.     She had a right atrial lead dislodgement post op then a subsequent right atrial lead revision 1014. Post, she developed a left subclavian vein thrombosis. She then had a laser lead extraction of the abandoned right atrial lead 2014.  She had a device generator change 2021.     She has been in persistent AF since 2023. She followed up in office in January and was started on metoprolol 25 mg twice daily. I saw her in May and we increased her metoprolol to 50 mg BID.     I reccommended CV. S/p cardioversion 2024.     Today the patient reports she still has palpitations described as skipped beats but she has had these for forever. She denies any sustained palpitations.     Her biggest issue is that she is having episodes of hypotension at home in the evenings and will sometimes need to skip her valsartan, then when she does this the following day BP is high the the AM.     She denies any nausea, vomiting, LE edema or  "orthopnea. Still has a cough from COVID, non-productive. One night of sharp chest pain lasting a few seconds.     She continues to follow closely with rheumatology at [facility] for her RA. She is on methotrexate 20 mg weekly.     Xarelto 20 mg daily for AC.     Device interrogation today shows normal device function with 98.7% effective CRT pacing.  She has had no episodes or events.  She is dependent on interrogation today.  She has noticed to have increasing thresholds in all leads including the right atrial lead, left ventricular lead and right ventricular lead.  She has always had high thresholds in the right ventricular lead but most recently the LV lead has also had up trending thresholds.     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    Weight as of this encounter: 92.5 kg (204 lb).             Physical Exam  Constitutional:       Appearance: Normal appearance.   HENT:      Head: Normocephalic and atraumatic.   Cardiovascular:      Rate and Rhythm: Normal rate and regular rhythm.      Pulses: Normal pulses.      Heart sounds: Normal heart sounds.   Pulmonary:      Effort: Pulmonary effort is normal.      Breath sounds: Normal breath sounds.   Musculoskeletal:      Right lower leg: No edema.      Left lower leg: No edema.   Skin:     General: Skin is warm and dry.   Neurological:      General: No focal deficit present.      Mental Status: She is alert and oriented to person, place, and time.          Cardiac Testing/Results " "    Cardiac Testing:   - Echo 6/20/2022: EF of 51 to 55% with Low normal LV systolic function.  Grade 1 diastolic dysfunction.  Mild TR.  RVSP 29 mmHg.  Mildly dilated left atrium     -CHANDANA 7/18/2024: EF 50% with normal RV size. No LINDA thrombus, Mild MR, trace to mild TR a the tricuspid valve repair. RVSP 20 mmHg    Result Review :  The following data was reviewed by: Darnell Triplett PA-C on 11/11/2024:       Lab Results   Component Value Date     07/18/2024     06/21/2024    K 4.1 07/18/2024    K 4.3 06/21/2024     (H) 07/18/2024     06/21/2024    CO2 24.1 07/18/2024    CO2 27.4 06/21/2024    BUN 16 07/18/2024    BUN 20 06/21/2024    CREATININE 0.95 07/18/2024    CREATININE 1.08 (H) 06/21/2024    EGFRIFNONA 75 03/04/2021    EGFRIFNONA 69 01/14/2021    EGFRIFAFRI >60 03/01/2023    EGFRIFAFRI >60 11/30/2022    GLUCOSE 99 07/18/2024    GLUCOSE 100 (H) 06/21/2024    CALCIUM 8.1 (L) 07/18/2024    CALCIUM 8.3 (L) 06/21/2024    ALBUMIN 4.0 11/30/2022    ALBUMIN 4.1 05/18/2022    AST 15 05/01/2024    AST 14 01/31/2024    ALT 12 05/01/2024    ALT 12 01/31/2024     Lab Results   Component Value Date    WBC 5.32 06/21/2024    WBC 5.27 05/01/2024    HGB 12.2 06/21/2024    HGB 12.5 05/01/2024    HCT 38.8 06/21/2024    HCT 39.3 05/01/2024    MCV 85.3 06/21/2024    MCV 84.2 05/01/2024     06/21/2024     05/01/2024     No results found for: \"PROBNP\", \"BNP\"  No results found for: \"CKTOTAL\", \"CKMB\", \"CKMBINDEX\", \"TROPONINI\", \"TROPONINT\"  Lab Results   Component Value Date    TSH 0.011 (L) 07/14/2021    TSH 0.033 (L) 03/04/2021                 ECG 12 Lead    Date/Time: 11/11/2024 9:31 AM  Performed by: Darnell Triplett PA-C    Authorized by: Darnell Triplett PA-C  Comparison: compared with previous ECG from 8/22/2024  Rhythm: paced  BPM: 67  ST Segments: ST segments normal  Comments: AP-, qtc 491                ASSESSMENT & PLAN       Diagnoses and all orders for this " visit:    1. Persistent atrial fibrillation (Primary)  She is in sinus rhythm on EKG  She had a subsequent CHANDANA and cardioversion on 7/18/2024 to restore sinus rhythm  Continue rivaroxaban 20 mg daily  Continue metoprolol 25 mg twice daily for rate control, decrease due to episodes of hypotension in the afternoon.  Able to take additional dose as needed for palpitations.  Open no recurrence of atrial fibrillation on interrogation since cardioversion.  Will monitor her pacemaker for recurrence of arrhythmia.  2. Nonischemic cardiomyopathy  Most recent echocardiogram shows improvement of ejection fraction to 50%.  She stays on valsartan and metoprolol long-term.  She is having issues with hypotension though so we will decrease her metoprolol.  Will stop blood pressure to be between 110-140  She also uses bumetanide as needed for lower extremity edema.  On examination today she is euvolemic  3. s/p Medtronic Bi-V pacemaker  She had a BiV pacemaker upgraded in 2013  Device interrogation today shows normal function with 98.7% effective CRT-P pacing  She has had a high RV lead threshold historically set at 2 at 1 and today on interrogation her LV lead thresholds are also trending up to be tested at 3.25 at 1.  Settings were adjusted but she is dependent and we will plan to see her back in the office in 3 months to recheck lead thresholds.  Battery currently has 2-1/2 years  She has had a previous left subclavian vein thrombosis  We will plan to get a chest x-ray to check placement of her pacemaker prior to next visit.  4. H/O tricuspid valve repair and ASD closure  History of a previous tricuspid valve repair with annuloplasty in 2010.  Most recent echocardiogram was reviewed with normal valvular function.  She will follow-up with Dr. Viera for this long-term        Follow Up:  Return in about 6 months (around 5/11/2025) for Dr. Lizama-Routine, 3 months with Kathy GIANG .  Patient was given instructions and counseling  regarding her condition or for health maintenance advice. Please contact office if worsening symptoms or proceed to ER when appropriate.      Darnell Triplett PA-C  11/11/24  09:51 EST    MEDICATIONS         Discharge Medications            Accurate as of November 11, 2024  9:51 AM. If you have any questions, ask your nurse or doctor.                Continue These Medications        Instructions Start Date   Actemra ACTPen 162 MG/0.9ML solution auto-injector injection  Generic drug: Tocilizumab   Every 14 Days      bumetanide 0.5 MG tablet  Commonly known as: BUMEX   1 mg, Oral, Daily PRN      Ferrous Sulfate Dried 200 (65 Fe) MG tablet tablet   200 mg, Daily      folic acid 1 MG tablet  Commonly known as: FOLVITE   1 mg, Daily      HYDROcodone-acetaminophen  MG per tablet  Commonly known as: NORCO   1 tablet, Every 6 Hours PRN      ipratropium-albuterol 0.5-2.5 mg/3 ml nebulizer  Commonly known as: DUO-NEB   3 mL, Nebulization, Every 4 Hours PRN      leflunomide 20 MG tablet  Commonly known as: ARAVA   20 mg, Daily      levothyroxine 175 MCG tablet  Commonly known as: SYNTHROID, LEVOTHROID   175 mcg, Daily      methotrexate 2.5 MG tablet   20 mg, Weekly      metoprolol tartrate 50 MG tablet  Commonly known as: LOPRESSOR   50 mg, Oral, 2 Times Daily, Take 50mg in am and 100mg in pm      omeprazole 40 MG capsule  Commonly known as: priLOSEC   40 mg, Daily      pantoprazole 40 MG EC tablet  Commonly known as: PROTONIX   40 mg, Daily      rivaroxaban 20 MG tablet  Commonly known as: XARELTO   20 mg, Oral, Daily With Dinner      valsartan 80 MG tablet  Commonly known as: DIOVAN   80 mg, Oral, Nightly      vitamin D 1.25 MG (69157 UT) capsule capsule  Commonly known as: ERGOCALCIFEROL   50,000 Units, 3 Times Weekly                   **Gamal Disclaimer: This note was dictated using an electronic transcription. The electronic translation of spoken language may permit erroneous, or at times, nonsensical words or  phrases to be inadvertently transcribed. Although I have reviewed the note for such errors, some may still exist.

## 2024-11-12 DIAGNOSIS — I48.19 PERSISTENT ATRIAL FIBRILLATION: ICD-10-CM

## 2024-11-12 DIAGNOSIS — I42.8 NONISCHEMIC CARDIOMYOPATHY: ICD-10-CM

## 2024-11-12 RX ORDER — METOPROLOL TARTRATE 25 MG/1
25 TABLET, FILM COATED ORAL 2 TIMES DAILY
Qty: 270 TABLET | Refills: 3 | Status: SHIPPED | OUTPATIENT
Start: 2024-11-12

## 2024-12-16 RX ORDER — RIVAROXABAN 20 MG/1
20 TABLET, FILM COATED ORAL
Qty: 90 TABLET | Refills: 1 | Status: SHIPPED | OUTPATIENT
Start: 2024-12-16

## 2024-12-16 NOTE — H&P
Patient Name:  Whitney Schofield  YOB: 1965  MRN:  [id number]  Admit Date:  3/11/2019  Patient Care Team:  Dmitri Schulte MD as PCP - General (Family Medicine)  Lion Marques MD as Consulting Physician (Cardiology)      No chief complaint on file.    Subjective   Ms. Schofield is a 53 y.o. non-smoker with a history of RA secondary Sjogren's, h/o papillary thyroid cancer s/p thyroidectomy, permanent pacemaker placement, nonischemic cardiomyopathy, LUKAS not on CPAP, and asthma that presents to [facility] complaining of several weeks of a URI and UTI.  She had a right shoulder surgery January 8.  Shortly after her surgery, she developed dysuria and frequency consistent with her history of recurrent UTIs.  Approximately, February 25 she developed cough, congestion, and fever (T-max 102).  Patient reported symptoms to her rheumatologist during an OV on 2/28. He diagnosed her with a URI and UTI. She was pescribed Levaquin but changed to nitrofurantoin when urine culture resultedwith an ESBL ecoli. Pt's WBC was normal on 2/28.  Rheumatologist also recommended stopping methotrexate and Arava due to active infection.  Her symptoms progressively worsened with persistent nonproductive cough, wheezing, and congestion.  Shortness of breath that is worse with exertion, talking and at night. She has pleuritic chest pain worse with deep breathe.  She is not on CPAP for history of LUKAS but states her oxygen saturations were dropping to 88-89%.  She does not think she has had a fever for over a week. Associated symptoms include malaise, decreased appetite, chills.  She was seen by cardiology and on March 6 had a CTA chest which was negative for PE but demonstrated small approximately 15 mm diameter groundglass nodular opacity in the right upper lobe.  She was seen by her PCP on 3/8/19.  Dr. Yeboah started her on clindamycin, Tessalon, Medrol Dosepak and Breo inhalers.  She was also instructed to  continue Macrobid for her UTI.  Labs from 3/8/19 include blood cultures without growth, normal WBC, and unremarkable BMP. Over the weekend, she continued to have severe coughing and dyspnea along with dysuria.   Per EMR records, PFTs from October 4, 2017 the patient had an FEV1-FVC of 85, FVC of 67%, DLCO of 73%. Moderate airway obstruction which is not reversible with broncho-dilators. Last stress test w/ EF = 59%.        History of Present Illness    Past Medical History:   Diagnosis Date   • Anemia    • ASD (atrial septal defect)    • Asthma    • Asystole (CMS/HCC)     Post ASD closure asystole and high-grade AV block.  s/p pacemaker 5/11/10.   • Celiac disease    • DVT (deep venous thrombosis) (CMS/HCC)     Left subclavian DVT following right atrial pacemaker lead revision in 2/14   • Edema    • Hashimoto's thyroiditis     s/p partial thyroidectomy 1/13/10   • Heart palpitations    • Hx of thyroid cancer    • Malignant neoplasm of thyroid gland (CMS/HCC)     Papillary carcinoma of the thyroid - s/p residual thyroidectomy in 2/11.   • Migraine    • Nonischemic cardiomyopathy (CMS/HCC)     EF as low as 35% 3/13.  EF 6/21/13 by CHANDANA was 50-55%, and 53% by echo on 6/17/14.  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  EF 50% by CHANDANA on 4/18/16.   • NSVT (nonsustained ventricular tachycardia) (CMS/HCC)     Noted by event recorder 9/12.  EP study by Dr. Aguilar on 11/20/12 showed no inducible VT.   • LUKAS on CPAP    • Papillary thyroid carcinoma (CMS/HCC) 08/27/2013   • Rheumatoid arthritis (CMS/HCC)     Severe and debilitating    • Secundum ASD     s/p open closure with a PeriPatch xenograft by Dr. Harper on 5/6/10 by Dr. Harper at [facility].   • Sleep apnea    • SOB (shortness of breath)     Multifactorial    • Thrombus due to any device, implant or graft     Extensive thrombus formation on the pacemaker leads by CHANDANA 3/25/13.  Initiated on Coumadin transiently at that time - improved significantly by CHANDANA  6/21/13.     Past Surgical History:   Procedure Laterality Date   • ASD AND VSD REPAIR     • CHOLECYSTECTOMY  2007   • ENDOSCOPY AND COLONOSCOPY  12/30/2015    Tom Orta MD   • EXCISION MASS TRUNK Right 7/20/2018    Procedure: EXCISION MASS TRUNK, RUQ ABD WALL LIPOMA;  Surgeon: Eduar Marx MD;  Location: [facility] OR [facility];  Service: General   • PACEMAKER IMPLANTATION  05/2010    Inital dual-chamber pacemaker placed 5/11/10 after ASD closure surgery.  Had LV lead placement and generator change on 8/19/13 by Dr. Aguilar (Bi02 [employer] #[id number]).  Then had right atrial lead revision on 2/19/14 (for right atrial lead dislodgement and diaphragmatic stimulation) - developed left subclavian DVT afterwards.  Then had laser lead extraction of the abandoned right atrial lead on 4/17/14.   • REPLACEMENT TOTAL KNEE BILATERAL     • THYROID LOBECTOMY Right 2012   • THYROIDECTOMY Left 2011    Dr. Coronado, radioactive iodine X3   • TONSILLECTOMY     • TRICUSPID VALVE SURGERY      #32 MC3 annuloplasty ring by Dr. Harper on 5/6/10 (at time of ASD closure)     Family History   Problem Relation Age of Onset   • COPD Mother    • Heart failure Mother         CHF   • Lung disease Mother    • Hypertension Mother    • Diabetes Mother    • Coronary artery disease Mother    • Diabetes Sister    • Breast cancer Maternal Aunt    • Malig Hyperthermia Neg Hx      Social History     Tobacco Use   • Smoking status: Never Smoker   • Smokeless tobacco: Never Used   Substance Use Topics   • Alcohol use: No   • Drug use: No     Medications Prior to Admission   Medication Sig Dispense Refill Last Dose   • albuterol sulfate  (90 Base) MCG/ACT inhaler Inhale 2 puffs Every 4 (Four) Hours As Needed for Wheezing. 2 puffs every 4 hours when necessary dyspnea 1 inhaler 1    • amoxicillin (AMOXIL) 500 MG capsule Take 4 capsules by mouth See Admin Instructions. 4 capsules 1 hour prior to procedure 4 capsule 6 Not Taking   • carvedilol (COREG) 3.125 MG  tablet Take 3.125 mg by mouth 2 (Two) Times a Day As Needed. DEPENDING ON BP.   Taking   • clindamycin (CLEOCIN) 300 MG capsule Take 1 capsule by mouth 3 (Three) Times a Day. x10d 30 capsule 0    • Ferrous Sulfate Dried 200 (65 FE) MG tablet Take 200 mg by mouth.   Taking   • folic acid (FOLVITE) 1 MG tablet Take 1 mg by mouth daily.   Taking   • HYDROcodone-acetaminophen (NORCO)  MG per tablet Take 1 tablet by mouth Every 6 (Six) Hours As Needed.   Taking   • levothyroxine (SYNTHROID, LEVOTHROID) 300 MCG tablet Take  by mouth Daily. Takes 227 mcg total daily  0 Taking   • losartan (COZAAR) 25 MG tablet Take 25 mg by mouth Daily As Needed.   Taking   • methotrexate 2.5 MG tablet Take 20 mg by mouth 1 (one) time per week.   Taking   • methylPREDNISolone (MEDROL, CAPO,) 4 MG tablet Take as directed on package instructions. 21 tablet 0    • omeprazole (priLOSEC) 40 MG capsule Take 40 mg by mouth Daily.   Taking   • potassium chloride (MICRO-K) 10 MEQ CR capsule Take 10 mEq by mouth As Needed.   Taking   • traZODone (DESYREL) 50 MG tablet Take 50 mg by mouth At Night As Needed.   Not Taking   • vitamin D (ERGOCALCIFEROL) 22437 units capsule capsule Take 50,000 Units by mouth 1 (One) Time Per Week. SUNDAY   Taking     Allergies:    Allergies   Allergen Reactions   • Ferumoxytol      Pt was hospitalized   • Azithromycin Rash       Review of Systems   Constitutional: Positive for appetite change, chills, fatigue and fever. Negative for activity change, diaphoresis and unexpected weight change.   HENT: Positive for congestion. Negative for trouble swallowing.    Eyes: Negative.    Respiratory: Positive for cough, chest tightness, shortness of breath and wheezing. Negative for choking.    Cardiovascular: Positive for chest pain. Negative for palpitations and leg swelling.        Pleuritic chest pain   Gastrointestinal: Positive for nausea. Negative for abdominal distention, abdominal pain, blood in stool, constipation,  diarrhea, rectal pain and vomiting.   Endocrine: Negative.  Negative for polydipsia, polyphagia and polyuria.   Genitourinary: Positive for dysuria and frequency.   Musculoskeletal: Positive for myalgias. Negative for back pain.   Skin: Positive for pallor. Negative for color change.   Allergic/Immunologic: Positive for immunocompromised state.   Neurological: Positive for light-headedness. Negative for dizziness.   Hematological: Does not bruise/bleed easily.   Psychiatric/Behavioral: Negative.         Objective    Vital Signs  Temp:  [97.9 °F (36.6 °C)] 97.9 °F (36.6 °C)  Resp:  [18] 18  BP: (143)/(87) 143/87  SpO2:  [96 %] 96 %  on   ;   Device (Oxygen Therapy): room air  There is no height or weight on file to calculate BMI.      Physical Exam   Constitutional: She is oriented to person, place, and time. She appears well-developed and well-nourished.   Ill appearing    HENT:   Head: Normocephalic and atraumatic.   Mouth/Throat: Oropharynx is clear and moist.   Eyes: EOM are normal. No scleral icterus.   Neck: Normal range of motion.   Cardiovascular: Normal rate, regular rhythm, normal heart sounds and intact distal pulses.   Pulmonary/Chest: Effort normal. No accessory muscle usage. No tachypnea. She has wheezes. She has rhonchi.   Abdominal: Soft. Bowel sounds are normal. She exhibits no shifting dullness, no distension, no pulsatile liver, no fluid wave, no abdominal bruit, no ascites, no pulsatile midline mass and no mass. There is no hepatosplenomegaly. There is no tenderness. There is no rigidity and no guarding. No hernia.   Musculoskeletal: Normal range of motion.   Neurological: She is alert and oriented to person, place, and time.   Skin: Skin is warm and dry.   Psychiatric: She has a normal mood and affect. Her behavior is normal. Thought content normal.   Nursing note and vitals reviewed.      Results Review:  I reviewed the patient's other test results and agree with the interpretation  I personally  viewed and interpreted the patient's EKG/Telemetry data  Results from last 7 days   Lab Units 03/08/19  1423   WBC 10*3/mm3 7.39   HEMOGLOBIN g/dL 13.1   HEMATOCRIT % 42.9   PLATELETS 10*3/mm3 342     Results from last 7 days   Lab Units 03/08/19  1423   SODIUM mmol/L 142   POTASSIUM mmol/L 3.9   CHLORIDE mmol/L 104   CO2 mmol/L 27.8   BUN mg/dL 8   CREATININE mg/dL 0.75   CALCIUM mg/dL 8.6   GLUCOSE mg/dL 89     Ct Angiogram Chest With & Without Contrast    Result Date: 3/6/2019  There is no CT evidence of pulmonary embolism. There is a single small approximately 15 mm diameter groundglass nodular opacity in the right upper lobe that could be a small area of pneumonia. Correlation clinical findings is recommended. Suggest a follow-up CT scan of the chest in 2-3 months after appropriate medical treatment.  This report was finalized on 3/6/2019 7:05 PM by Dr. Ron Gillette M.D.                Lab Results (last 24 hours)     ** No results found for the last 24 hours. **          Imaging Results (last 24 hours)     ** No results found for the last 24 hours. **          No orders to display     Assessment/Plan   Active Hospital Problems    Diagnosis  POA   • **PNA (pneumonia) [J18.9]  Yes   • LUKAS (obstructive sleep apnea) not on CPAP [G47.33]  Yes   • Nonischemic cardiomyopathy (CMS/HCC) [I42.8]  Yes   • History of papillary adenocarcinoma of thyroid [Z85.850]  Not Applicable   • Bronchospasm [J98.01]  Yes   • Asthma [J45.909]  Yes   • UTI (urinary tract infection) due to urinary indwelling catheter (CMS/HCC) [T83.511A, N39.0]  Yes   • Presence of cardiac pacemaker [Z95.0]  Yes   • UTI due to extended-spectrum beta lactamase (ESBL) producing Escherichia coli [N39.0, B96.29, Z16.12]  Yes   • Immunocompromised (CMS/HCC) [D84.9]  Yes      Resolved Hospital Problems   No resolved problems to display.       Ms. Schofield is a 53 y.o. immunocompromised with recent diagnosis of ESBL E. coli UTI and PNA on CTA 3/6. She has been on  macrobid for UTI and started on clindamycin 3 days ago for pneumonia.  It is unclear if she has pneumonia versus post viral COPD exacerbation. Recent blood cultures were negative, she reports being afebrile for over a week, and no leukocytosis on labs 3/8. In addition, rheumatoid flare could be causing the ground glass appearance on CTA.   · Admitted to monitored unit.  · Check CXR  · IVFs @75 cc/hr.   · Check CBC, CMP, procal, lactic acid, VRP  · Check rheumatoid factor, inflammatory markers, TSH  · If evidence of active PNA on above workup will start antibiotic. Urine antigens and sputum culture. Results likely unreliable d/t prior abx.  · She reports an allergy to Zithromax  · Supplemental oxygen to keep SaO2 > 92%  · Increase prednisone  · Bronchodilators and antitussives as needed  · Physical therapy to see    ESBL E.coli UTI  · Diagnosed with culture 3/2.   · Per culture results she has been on nitrofurantoin.  · She still has dysuria although it is improved  · Repeat UA    · merrem iv  · She has been previously seen for this by Dr Ellis, will ask them to see    SCDs  Full code     I discussed the patients findings and my recommendations with patient, family and nursing staff.      ARHAT Galeana  [facility]  03/11/19  3:08 PM     I have reviewed the history and plan as obtained by Berkley GIANG. I have personally examined the patient. My exam confirms her physical findings and I agree with the plan as listed above.  Dakota Hernandez MD  03/11/2019     sent home w/ family/friend

## 2025-02-12 ENCOUNTER — HOSPITAL ENCOUNTER (OUTPATIENT)
Dept: GENERAL RADIOLOGY | Facility: HOSPITAL | Age: 60
Discharge: HOME | End: 2025-02-12
Admitting: PHYSICIAN ASSISTANT
Payer: OTHER GOVERNMENT

## 2025-02-12 DIAGNOSIS — I48.19 PERSISTENT ATRIAL FIBRILLATION: ICD-10-CM

## 2025-02-12 DIAGNOSIS — Z95.0 PRESENCE OF CARDIAC PACEMAKER: ICD-10-CM

## 2025-02-12 PROCEDURE — 71046 X-RAY EXAM CHEST 2 VIEWS: CPT

## 2025-02-16 NOTE — PROGRESS NOTES
Date of Office Visit: 2023  Encounter Provider: Eduar Lizama MD  Place of Service: [facility] CARDIOLOGY  Patient Name: Whitney Schofield  :1965    Chief Complaint   Patient presents with    NICM    chronic combined systolic & diastolic CHF    Pacemaker Check   :     HPI: Whitney Schofield is a 57 y.o. female who presents today for establish device follow-up    In  she was found to have a significant ASD, which was surgically repaired.   She developed post operative heart block, and had a pacemaker implanted.  Developed a cardiomyopathy and was upgraded to ICD with lead extraction.  Vein thrombosis, lead dislodgement, but things have been ok since .      In regards to her current device.  Thresholds was slightly elevated.  Impedance has been gradually increase.  She is 99% paced.  EF has recovered.  Her paced morphology looks good.     She complains of some mild fatigue, but otherwise no significant symptoms at this time.     She has no complaints related to the pocket.           Past Medical History:   Diagnosis Date    Anemia     ASD (atrial septal defect)     Asthma     Asystole     Post ASD closure asystole and high-grade AV block.  s/p pacemaker 5/11/10.    Celiac disease     DVT (deep venous thrombosis)     Left subclavian DVT following right atrial pacemaker lead revision in     Edema     Hashimoto's thyroiditis     s/p partial thyroidectomy 1/13/10    Heart palpitations     Hx of thyroid cancer     Malignant neoplasm of thyroid gland     Papillary carcinoma of the thyroid - s/p residual thyroidectomy in .    Migraine     Nonischemic cardiomyopathy     EF as low as 35% 3/13.  EF 13 by CHANDANA was 50-55%, and 53% by echo on 14.  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  EF 50% by CHANDANA on 16.    NSVT (nonsustained ventricular tachycardia)     Noted by event recorder .  EP study by Dr. Aguilar on 12 showed no inducible  NEPHROLOGY PROGRESS NOTE    ADMISSION DATE:  2/13/2025  DATE:  2/16/2025  CURRENT HOSPITAL DAY:  Hospital Day: 4  ATTENDING PHYSICIAN:  Zen Cedillo MD      CHIEF COMPLAINT: ESRD.  Needs dialysis      INTERVAL HISTORY:    Awake and alert.  Offers no complaints  Plan for HD today due to staffing issues tomorrow    REVIEW OF SYSTEMS:   A complete 10 point ROS done and is neg except as mentioned above        MEDICATIONS:    Current Facility-Administered Medications   Medication Dose Route Frequency Provider Last Rate Last Admin    apixaBAN (ELIQUIS) tablet 5 mg  5 mg Oral 2 times per day Zen Cedillo MD   5 mg at 02/15/25 2034    aspirin chewable 81 mg  81 mg Oral Daily Zen Cedillo MD   81 mg at 02/15/25 1057    pantoprazole (PROTONIX INJECT) injection 40 mg  40 mg Intravenous 2 times per day Lin Mims PA-C   40 mg at 02/16/25 0844    sucralfate (CARAFATE) 1 GM/10ML suspension 1 g  1 g Oral 4x Daily Lin Mims PA-C   1 g at 02/16/25 0843    cinacalcet (SENSIPAR) tablet 150 mg  150 mg Oral Once per day on Monday Wednesday Friday Zen Cedillo MD        levETIRAcetam (KepPRA INJECT) injection 250 mg  250 mg Intravenous 2 times per day Zen Cedillo MD   250 mg at 02/16/25 0844    carvedilol (COREG) tablet 12.5 mg  12.5 mg Oral 2 times per day Zen Cedillo MD   12.5 mg at 02/15/25 2033    dilTIAZem (TIAZAC,CARDIZEM CD) 24 hr capsule 300 mg  300 mg Oral Daily Zen Cedillo MD   300 mg at 02/15/25 1057    DULoxetine (CYMBALTA) capsule 30 mg  30 mg Oral Daily Zen Cedillo MD   30 mg at 02/15/25 1057    fluticasone-vilanterol (BREO ELLIPTA) 200-25 MCG/ACT inhaler 1 puff  1 puff Inhalation Daily Resp Zen Cedillo MD   1 puff at 02/16/25 0749    traZODone (DESYREL) tablet 50 mg  50 mg Oral Nightly Zen Cedillo MD   50 mg at 02/15/25 2033    Magnesium Standard Replacement Protocol   Does not apply See Admin Instructions Zen Cedillo MD        Potassium Standard Replacement Protocol (Levels 3.5 and  VT.    LUKAS on CPAP     Papillary thyroid carcinoma 08/27/2013    Rheumatoid arthritis     Severe and debilitating     Secundum ASD     s/p open closure with a PeriPatch xenograft by Dr. Harper on 5/6/10 by Dr. Harper at [facility].    Sleep apnea     SOB (shortness of breath)     Multifactorial     Thrombus due to any device, implant or graft     Extensive thrombus formation on the pacemaker leads by CHANDANA 3/25/13.  Initiated on Coumadin transiently at that time - improved significantly by CHANDANA 6/21/13.       Past Surgical History:   Procedure Laterality Date    ASD AND VSD REPAIR      CARDIAC ELECTROPHYSIOLOGY PROCEDURE N/A 1/18/2021    Procedure: Generator Change BI Ventriculat PPM- Medtronic;  Surgeon: Jose Mcdowell MD;  Location: [facility] CATH INVASIVE LOCATION;  Service: Cardiology;  Laterality: N/A;    CHOLECYSTECTOMY  2007    ENDOSCOPY AND COLONOSCOPY  12/30/2015    Tom Orta MD    EXCISION MASS TRUNK Right 7/20/2018    Procedure: EXCISION MASS TRUNK, RUQ ABD WALL LIPOMA;  Surgeon: Eduar Marx MD;  [place] [facility] [place] [facility] [place]: [place]    PACEMAKER IMPLANTATION  05/2010    Inital dual-chamber pacemaker placed 5/11/10 after ASD closure surgery.  Had LV lead placement and generator change on 8/19/13 by Dr. Aguilar (Medtronic #[id number]).  Then had right atrial lead revision on 2/19/14 (for right atrial lead dislodgement and diaphragmatic stimulation) - developed left subclavian DVT afterwards.  Then had laser lead extraction of the abandoned right atrial lead on 4/17/14.    REPLACEMENT TOTAL KNEE BILATERAL      SHOULDER SURGERY Right     THYROID LOBECTOMY Right 2012    THYROIDECTOMY Left 2011    Dr. Coronado, radioactive iodine X3    TONSILLECTOMY      TRICUSPID VALVE SURGERY      #32 MC3 annuloplasty ring by Dr. Harper on 5/6/10 (at time of ASD closure)       Social History     Socioeconomic History    Marital status:     Number of children: 2   Tobacco Use    Smoking status: Never      lower)   Does not apply See Admin Instructions Zen Cedillo MD             OBJECTIVE:    VITAL SIGNS:     Visit Vitals  /68 (BP Location: LUE - Left upper extremity, Patient Position: Semi-Pope's)   Pulse 73   Temp 98.6 °F (37 °C) (Oral)   Resp 18   Ht 5' 2\" (1.575 m)   Wt 72.6 kg (160 lb 0.9 oz)   LMP 01/01/2003   SpO2 94%   BMI 29.27 kg/m²         PHYSICAL EXAM:    Constitutional:Lying in bed, comfortable. In no distress   HEENT: Atraumatic and Normocephalic  CVS: S1 and S2  Respiratory: Normal Vesicular breathing  Abdomen: Soft and non tender.   CNS: Awake and Alert.   Extremities: No pedal edema      LABORATORY DATA:    Lab Results   Component Value Date    SODIUM 135 02/16/2025    SODIUM 134 (L) 02/15/2025    POTASSIUM 3.9 02/16/2025    POTASSIUM 3.8 02/15/2025    CHLORIDE 98 02/16/2025    CHLORIDE 99 02/15/2025    CO2 26 02/16/2025    CO2 27 02/15/2025    BUN 32 (H) 02/16/2025    BUN 22 (H) 02/15/2025    CREATININE 8.65 (H) 02/16/2025    CREATININE 6.69 (H) 02/15/2025    GLUCOSE 62 (L) 02/16/2025    GLUCOSE 96 02/15/2025     Lab Results   Component Value Date    WBC 6.9 02/16/2025    WBC 8.2 02/15/2025    HCT 40.5 02/16/2025    HCT 39.3 02/15/2025    HGB 12.4 02/16/2025    HGB 12.1 02/15/2025     02/16/2025     02/15/2025     No components found for: \"CRCLCALC\"  Lab Results   Component Value Date    INR 1.0 10/22/2024    INR 1.0 04/18/2024       I have personally reviewed the above labs and summarized it in my plan below     INTAKE/OUTPUT:       Intake/Output Summary (Last 24 hours) at 2/16/2025 0909  Last data filed at 2/16/2025 0600  Gross per 24 hour   Intake 520 ml   Output 0 ml   Net 520 ml                             ASSESSMENT/PLAN:     ESRD on IHD.  Monday, Wednesday and Friday.  Denisa Soddy-Daisy  Dysphagia status post EGD on 2/14/2024.  Noted to have ulcerated two thirds of esophagus till GE junction  Diabetes mellitus type 2 with diabetic neuropathy  History of SVT  History  Passive exposure: Never    Smokeless tobacco: Never   Vaping Use    Vaping Use: Never used   Substance and Sexual Activity    Alcohol use: No    Drug use: No    Sexual activity: Yes     Partners: Male       Family History   Problem Relation Age of Onset    COPD Mother     Heart failure Mother         CHF    Lung disease Mother     Hypertension Mother     Diabetes Mother     Coronary artery disease Mother     Diabetes Sister     Breast cancer Maternal Aunt     Malig Hyperthermia Neg Hx        Review of Systems   Constitutional: Negative.   Cardiovascular: Negative.    Respiratory: Negative.     Gastrointestinal: Negative.        Allergies   Allergen Reactions    Ferumoxytol      Pt was hospitalized    Azithromycin Rash    Sugammadex Hives     Possible reaction with hives/redness in PACU after TSA on 3/16/21.  After chart review, it looks like this was the only mediation that the patient had not previously received.         Current Outpatient Medications:     Actemra ACTPen 162 MG/0.9ML solution auto-injector injection, Every 14 (Fourteen) Days., Disp: , Rfl:     bumetanide (BUMEX) 0.5 MG tablet, Take 2 tablets by mouth Daily As Needed (swelling)., Disp: 90 tablet, Rfl: 1    cloNIDine (CATAPRES) 0.2 MG tablet, Take 1 tablet by mouth 2 (Two) Times a Day. Take blood twice a day as needed for blood pressure over 180/90., Disp: 14 tablet, Rfl: 0    Ferrous Sulfate Dried 200 (65 FE) MG tablet, Take 1 tablet by mouth Daily., Disp: , Rfl:     folic acid (FOLVITE) 1 MG tablet, Take 1 tablet by mouth Daily., Disp: , Rfl:     HYDROcodone-acetaminophen (NORCO)  MG per tablet, Take 1 tablet by mouth Every 6 (Six) Hours As Needed., Disp: , Rfl:     ipratropium-albuterol (DUO-NEB) 0.5-2.5 mg/3 ml nebulizer, Take 3 mL by nebulization Every 4 (Four) Hours As Needed for Wheezing or Shortness of Air., Disp: 360 mL, Rfl: 0    leflunomide (ARAVA) 20 MG tablet, Take 1 tablet by mouth Daily., Disp: , Rfl:     levothyroxine  of pulmonary fibrosis  History of PAD status post left BKA  History of prior CVA  Generalized anxiety disorder     Plan:  Planning to do dialysis today due to staffing issues tomorrow  Electrolytes are within normal limit  Hemoglobin is above goal.  Holding further doses of Epogen  Blood pressure is better.  Continue home regimen  As per GI advance diet as tolerated.  Continue PPI and Carafate. Recommend to use the lowest dose of Carafate as it has aluminum in it  No contraindication for discharge from nephrology perspective     Plan discussed with RN and primary team in detail     Nidia Truong MD [facility] Nephrology   Pager: [phone]  Cell: [phone]                 "(SYNTHROID, LEVOTHROID) 175 MCG tablet, Take 1 tablet by mouth Daily., Disp: , Rfl:     losartan (COZAAR) 25 MG tablet, Take 1 tablet by mouth Daily As Needed., Disp: , Rfl:     methotrexate 2.5 MG tablet, Take 8 tablets by mouth 1 (One) Time Per Week., Disp: , Rfl:     omeprazole (priLOSEC) 40 MG capsule, Take 1 capsule by mouth Daily., Disp: , Rfl:     pantoprazole (PROTONIX) 40 MG EC tablet, Take 1 tablet by mouth Daily., Disp: , Rfl:     vitamin D (ERGOCALCIFEROL) 10525 units capsule capsule, Take 1 capsule by mouth 3 (Three) Times a Week., Disp: , Rfl:     metoprolol succinate XL (TOPROL-XL) 25 MG 24 hr tablet, Take 1 tablet by mouth Daily., Disp: 90 tablet, Rfl: 3      Objective:     Vitals:    11/09/23 1254   BP: 144/92   Pulse: 83   Weight: 91.6 kg (202 lb)   Height: 165.1 cm (65\")     Body mass index is 33.61 kg/m².    PHYSICAL EXAM:    Vitals and nursing note reviewed.   Constitutional:       General: Not in acute distress.  Pulmonary:      Effort: Pulmonary effort is normal. No respiratory distress.   Chest:      Comments: ICD in place on left chest  Cardiovascular:      Normal rate. Regular rhythm.   Skin:     General: Skin is warm and dry.   Neurological:      Mental Status: Alert and oriented to person, place, and time.   Psychiatric:         Behavior: Behavior normal.         Thought Content: Thought content normal.         Judgment: Judgment normal.             ECG 12 Lead    Date/Time: 11/10/2023 7:22 PM  Performed by: Eduar Lizama MD    Authorized by: Eduar Lizama MD  Comparison: compared with previous ECG from 5/2/2023  Similar to previous ECG  Rhythm: sinus rhythm and paced            Assessment:       Diagnosis Plan   1. Nonischemic cardiomyopathy        2. Presence of cardiac pacemaker               Plan:       RV Threshold is slightly high, impendence is trending higher.  No noise.  I feel that this is probably fibrosis at the lead tip.  We will continue to watch.  Unlikely " that LV and RV lead will both fail.  At time of generator change could consider replacing/ possibly with extraction, but that is years down the road.     As always, it has been a pleasure to participate in your patient's care.      Sincerely,         Eduar Lizama MD

## 2025-02-17 ENCOUNTER — TELEPHONE (OUTPATIENT)
Age: 60
End: 2025-02-17
Payer: OTHER GOVERNMENT

## 2025-02-17 NOTE — TELEPHONE ENCOUNTER
Patient with DDD/CRT-P, remote transmission reviewed today alerted for RV lead switch on 2/15 to uniploar. Bipolar lead impedance on 2/15 resulted @ 2983 ohms, unipolar resulted @ 1330 ohms. LV lead impedance resulted @ 2147 ohms with known high LV and RV  thresholds. This appears to be the first time RV has switched to unipolar due to high lead impedance. Patient has appt. with you on 2/18 with device check.

## 2025-02-18 ENCOUNTER — CLINICAL SUPPORT NO REQUIREMENTS (OUTPATIENT)
Age: 60
End: 2025-02-18
Payer: OTHER GOVERNMENT

## 2025-02-18 ENCOUNTER — OFFICE VISIT (OUTPATIENT)
Age: 60
End: 2025-02-18
Payer: OTHER GOVERNMENT

## 2025-02-18 VITALS
WEIGHT: 201 LBS | HEIGHT: 65 IN | SYSTOLIC BLOOD PRESSURE: 124 MMHG | HEART RATE: 66 BPM | DIASTOLIC BLOOD PRESSURE: 80 MMHG | BODY MASS INDEX: 33.49 KG/M2

## 2025-02-18 DIAGNOSIS — I42.8 NONISCHEMIC CARDIOMYOPATHY: Primary | ICD-10-CM

## 2025-02-18 DIAGNOSIS — I48.19 PERSISTENT ATRIAL FIBRILLATION: Primary | ICD-10-CM

## 2025-02-18 PROCEDURE — 93000 ELECTROCARDIOGRAM COMPLETE: CPT | Performed by: INTERNAL MEDICINE

## 2025-02-18 PROCEDURE — 99214 OFFICE O/P EST MOD 30 MIN: CPT | Performed by: INTERNAL MEDICINE

## 2025-02-18 NOTE — PROGRESS NOTES
Date of Office Visit: 2025  Encounter Provider: Eduar Lizama MD  Place of Service: [facility] CARDIOLOGY  Patient Name: Whitney Schofield  :1965    Chief Complaint   Patient presents with    Follow-up     3 mths    Atrial Fibrillation     Persistent      Cardiomyopathy     Nonischemic   :     HPI: Whitney Schofield is a 59 y.o. female who presents today for heart block, lead malfunction    She is here today for routine follow-up.  She has a history of a CRT-D placed following previous heart surgery for ASD repair and tricuspid repair.    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    She has had largely routine device follow-up however the issue is that her thresholds continue to rise, and the impedances of all the leads are increasing steadily.  This is causing significant drain on her battery.    Has had normalization of her ejection fraction.  Her last echo was in .    She developed persistent atrial fibrillation, I cardioverted her, and she has remained in sinus rhythm now for greater than 6 months.          Past Medical History:   Diagnosis Date    Anemia     ASD (atrial septal defect)     Asthma     Asystole     Post ASD closure asystole and high-grade AV block.  s/p pacemaker 5/11/10.    Celiac disease     DVT (deep venous thrombosis)     Left subclavian DVT following right atrial pacemaker lead revision in     Edema     Hashimoto's thyroiditis     s/p partial thyroidectomy 1/13/10    Heart palpitations     Hx of thyroid cancer     Malignant neoplasm of thyroid gland     Papillary carcinoma of the thyroid - s/p residual thyroidectomy in .    Migraine     Nonischemic cardiomyopathy     EF as low as 35% 3/13.  EF 13 by CHANDANA was 50-55%, and 53% by echo  on 6/17/14.  [place] to possibly be a pacemaker-induced cardiomyopathy.  EF 50% by CHANDANA on 4/18/16.    NSVT (nonsustained ventricular tachycardia)     Noted by event recorder 9/12.  EP study by Dr. Aguilar on 11/20/12 showed no inducible VT.    LUKAS on CPAP     Papillary thyroid carcinoma 08/27/2013    Rheumatoid arthritis     Severe and debilitating     Secundum ASD     s/p open closure with a PeriPatch xenograft by Dr. Harper on 5/6/10 by Dr. Harper at [facility].    Sleep apnea     SOB (shortness of breath)     Multifactorial     Thrombus due to any device, implant or graft     Extensive thrombus formation on the pacemaker leads by CHANDANA 3/25/13.  Initiated on Coumadin transiently at that time - improved significantly by CHANDANA 6/21/13.       Past Surgical History:   Procedure Laterality Date    ASD AND VSD REPAIR      CARDIAC ELECTROPHYSIOLOGY PROCEDURE N/A 1/18/2021    Procedure: Generator Change BI Ventriculat PPM- [employer];  Surgeon: Jose Mcdowell MD;  Location: [facility] CATH INVASIVE LOCATION;  Service: Cardiology;  Laterality: N/A;    CHOLECYSTECTOMY  2007    ENDOSCOPY AND COLONOSCOPY  12/30/2015    Tom Orta MD    EXCISION MASS TRUNK Right 7/20/2018    Procedure: EXCISION MASS TRUNK, RUQ ABD WALL LIPOMA;  Surgeon: Eduar Marx MD;  [place] [facility] [place] [facility] [place]  Service: General    PACEMAKER IMPLANTATION  05/2010    Inital dual-chamber pacemaker placed 5/11/10 after ASD closure surgery.  Had LV lead placement and generator change on 8/19/13 by Dr. Aguilar ([employer] #[id number]).  Then had right atrial lead revision on 2/19/14 (for right atrial lead dislodgement and diaphragmatic stimulation) - developed left subclavian DVT afterwards.  Then had laser lead extraction of the abandoned right atrial lead on 4/17/14.    REPLACEMENT TOTAL KNEE BILATERAL      SHOULDER SURGERY Right     THYROID LOBECTOMY Right 2012    THYROIDECTOMY Left 2011    Dr. Coronado, radioactive iodine X3    TONSILLECTOMY      TRICUSPID VALVE  SURGERY      #32 MC3 annuloplasty ring by Dr. Harper on 5/6/10 (at time of ASD closure)       Social History     Socioeconomic History    Marital status:     Number of children: 2   Tobacco Use    Smoking status: Never     Passive exposure: Never    Smokeless tobacco: Never   Vaping Use    Vaping status: Never Used   Substance and Sexual Activity    Alcohol use: No    Drug use: No    Sexual activity: Yes     Partners: Male       Family History   Problem Relation Age of Onset    COPD Mother     Heart failure Mother         CHF    Lung disease Mother     Hypertension Mother     Diabetes Mother     Coronary artery disease Mother     Diabetes Sister     Breast cancer Maternal Aunt     Malig Hyperthermia Neg Hx        Review of Systems   Constitutional: Negative.   Cardiovascular: Negative.    Respiratory: Negative.     Gastrointestinal: Negative.        Allergies   Allergen Reactions    Ferumoxytol      Pt was hospitalized    Azithromycin Rash    Sugammadex Hives     Possible reaction with hives/redness in PACU after TSA on 3/16/21.  After chart review, it looks like this was the only mediation that the patient had not previously received.         Current Outpatient Medications:     Actemra ACTPen 162 MG/0.9ML solution auto-injector injection, Every 14 (Fourteen) Days., Disp: , Rfl:     bumetanide (BUMEX) 0.5 MG tablet, Take 2 tablets by mouth Daily As Needed (swelling)., Disp: 90 tablet, Rfl: 1    Ferrous Sulfate Dried 200 (65 FE) MG tablet, Take 1 tablet by mouth Daily., Disp: , Rfl:     folic acid (FOLVITE) 1 MG tablet, Take 1 tablet by mouth Daily., Disp: , Rfl:     HYDROcodone-acetaminophen (NORCO)  MG per tablet, Take 1 tablet by mouth Every 6 (Six) Hours As Needed., Disp: , Rfl:     ipratropium-albuterol (DUO-NEB) 0.5-2.5 mg/3 ml nebulizer, Take 3 mL by nebulization Every 4 (Four) Hours As Needed for Wheezing or Shortness of Air., Disp: 360 mL, Rfl: 0    leflunomide (ARAVA) 20 MG tablet, Take 1 tablet  "by mouth Daily., Disp: , Rfl:     levothyroxine (SYNTHROID, LEVOTHROID) 175 MCG tablet, Take 1 tablet by mouth Daily., Disp: , Rfl:     methotrexate 2.5 MG tablet, Take 8 tablets by mouth 1 (One) Time Per Week., Disp: , Rfl:     metoprolol tartrate (LOPRESSOR) 25 MG tablet, Take 1 tablet by mouth 2 (Two) Times a Day. May take additional 25mg as needed for palpitations, Disp: 270 tablet, Rfl: 3    omeprazole (priLOSEC) 40 MG capsule, Take 1 capsule by mouth Daily., Disp: , Rfl:     pantoprazole (PROTONIX) 40 MG EC tablet, Take 1 tablet by mouth Daily., Disp: , Rfl:     rivaroxaban (Xarelto) 20 MG tablet, TAKE 1 TABLET DAILY WITH DINNER, Disp: 90 tablet, Rfl: 1    valsartan (DIOVAN) 80 MG tablet, Take 1 tablet by mouth Every Night., Disp: 90 tablet, Rfl: 3    vitamin D (ERGOCALCIFEROL) 16454 units capsule capsule, Take 1 capsule by mouth 3 (Three) Times a Week., Disp: , Rfl:       Objective:     Vitals:    02/18/25 1336   BP: 124/80   BP Location: Left arm   Patient Position: Sitting   Cuff Size: Adult   Pulse: 66   Weight: 91.2 kg (201 lb)   Height: 165.1 cm (65\")     Body mass index is 33.45 kg/m².    PHYSICAL EXAM:    Vitals and nursing note reviewed.   Constitutional:       General: Not in acute distress.     Appearance: Normal and healthy appearance. Not in distress.   Pulmonary:      Effort: Pulmonary effort is normal. No respiratory distress.   Chest:      Comments: Normal appearance of device pocket.  Cardiovascular:      Normal rate. Regular rhythm.   Skin:     General: Skin is warm and dry.   Neurological:      Mental Status: Alert and oriented to person, place, and time.   Psychiatric:         Behavior: Behavior normal. Behavior is cooperative.         Thought Content: Thought content normal.         Judgment: Judgment normal.             ECG 12 Lead    Date/Time: 2/18/2025 3:14 PM  Performed by: Eduar Lizama MD    Authorized by: Eduar Lizama MD  Comparison: compared with previous ECG from " 6/12/2024  Rhythm: sinus rhythm and paced            Assessment:      No diagnosis found.       Plan:       There is a really tough device management issue.    I do not think that her leads will last the rest of her lifetime.  And they are causing a significant battery drain, necessitating sooner and sooner generator implants each with the risk of infection.    We discussed a couple of options, we could place a leadless pacemaker, but she would lose CRT pacing.    We could perform a device extraction and try to at least implant a new RV lead.  1 issue with extraction is her CS lead is a star fix lead which is notoriously difficult lead to extract, and likely cannot be done completely.    I am going to consider, and discussed with her further.    Her A-fib is better controlled.    As always, it has been a pleasure to participate in your patient's care.      Sincerely,         Eduar Lizama MD

## 2025-02-20 DIAGNOSIS — T82.110A PACEMAKER LEAD MALFUNCTION, INITIAL ENCOUNTER: Primary | ICD-10-CM

## 2025-02-21 ENCOUNTER — OFFICE VISIT (OUTPATIENT)
Dept: FAMILY MEDICINE CLINIC | Facility: CLINIC | Age: 60
End: 2025-02-21
Payer: OTHER GOVERNMENT

## 2025-02-21 ENCOUNTER — HOSPITAL ENCOUNTER (OUTPATIENT)
Dept: GENERAL RADIOLOGY | Facility: HOSPITAL | Age: 60
Discharge: HOME | End: 2025-02-21
Payer: OTHER GOVERNMENT

## 2025-02-21 ENCOUNTER — PATIENT MESSAGE (OUTPATIENT)
Age: 60
End: 2025-02-21
Payer: OTHER GOVERNMENT

## 2025-02-21 VITALS
SYSTOLIC BLOOD PRESSURE: 134 MMHG | TEMPERATURE: 97.3 F | OXYGEN SATURATION: 100 % | HEIGHT: 65 IN | WEIGHT: 198.6 LBS | DIASTOLIC BLOOD PRESSURE: 80 MMHG | BODY MASS INDEX: 33.09 KG/M2 | HEART RATE: 90 BPM

## 2025-02-21 DIAGNOSIS — M25.521 RIGHT ELBOW PAIN: ICD-10-CM

## 2025-02-21 DIAGNOSIS — G89.29 CHRONIC RIGHT SHOULDER PAIN: ICD-10-CM

## 2025-02-21 DIAGNOSIS — Z12.31 ENCOUNTER FOR SCREENING MAMMOGRAM FOR MALIGNANT NEOPLASM OF BREAST: ICD-10-CM

## 2025-02-21 DIAGNOSIS — Z96.619 HISTORY OF SHOULDER REPLACEMENT, UNSPECIFIED LATERALITY: ICD-10-CM

## 2025-02-21 DIAGNOSIS — M25.511 CHRONIC RIGHT SHOULDER PAIN: ICD-10-CM

## 2025-02-21 DIAGNOSIS — M25.521 RIGHT ELBOW PAIN: Primary | ICD-10-CM

## 2025-02-21 DIAGNOSIS — I10 PRIMARY HYPERTENSION: ICD-10-CM

## 2025-02-21 PROCEDURE — 73070 X-RAY EXAM OF ELBOW: CPT

## 2025-02-21 PROCEDURE — 73030 X-RAY EXAM OF SHOULDER: CPT

## 2025-02-21 PROCEDURE — 99214 OFFICE O/P EST MOD 30 MIN: CPT

## 2025-02-21 NOTE — PROGRESS NOTES
"Chief Complaint  elbow pain (Pt is here for Right elbow and shoulder pain for about a few months but lately its been constant )    Subjective        Whitney Schofield presents to [facility]    History of Present Illness  59 year old female presents for right elbow, right shoulder pain and swelling that began a few months ago. Confirms numbness and tingling in right extremity. She has history of shoulder surgery and in need of referral back to Orthopedic Surgery Dr. Crum. Takes hydrocodone due to rheumatoid arthritis pain. Hypertension is well controlled at 134/80. Denies chest pain and shortness of breath. Due for mammogram. Follow up with PCP for physical with lab work.     Pt is new to me, followed by PCP RAHAT Starks.       Objective   Vital Signs:  /80 (BP Location: Left arm, Patient Position: Sitting, Cuff Size: Adult)   Pulse 90   Temp 97.3 °F (36.3 °C) (Temporal)   Ht 165.1 cm (65\")   Wt 90.1 kg (198 lb 9.6 oz)   SpO2 100%   BMI 33.05 kg/m²   Estimated body mass index is 33.05 kg/m² as calculated from the following:    Height as of this encounter: 165.1 cm (65\").    Weight as of this encounter: 90.1 kg (198 lb 9.6 oz).      Physical Exam  Constitutional:       Appearance: Normal appearance.   HENT:      Head: Normocephalic.   Eyes:      Conjunctiva/sclera: Conjunctivae normal.      Pupils: Pupils are equal, round, and reactive to light.   Cardiovascular:      Rate and Rhythm: Normal rate and regular rhythm.      Pulses: Normal pulses.      Heart sounds: Normal heart sounds.   Pulmonary:      Effort: Pulmonary effort is normal.      Breath sounds: Normal breath sounds.   Musculoskeletal:      Right shoulder: Bony tenderness present.      Right elbow: Swelling present. Tenderness present.   Skin:     General: Skin is warm.   Neurological:      General: No focal deficit present.      Mental Status: She is alert and oriented to person, place, and time. "   Psychiatric:         Mood and Affect: Mood normal.         Behavior: Behavior normal.            Result Review :                Assessment and Plan   Diagnoses and all orders for this visit:    1. Right elbow pain (Primary)  -     XR elbow 2 vw right; Future  -     XR Shoulder 2+ View Right; Future  -     Ambulatory Referral to Orthopedic Surgery    2. Primary hypertension  Assessment & Plan:  Hypertension is stable and controlled  Continue current treatment regimen.  Dietary sodium restriction.  Weight loss.  Regular aerobic exercise.  Ambulatory blood pressure monitoring.  Blood pressure will be reassessed  next scheduled appt .      3. Chronic right shoulder pain  -     XR elbow 2 vw right; Future  -     XR Shoulder 2+ View Right; Future  -     Ambulatory Referral to Orthopedic Surgery    4. Encounter for screening mammogram for malignant neoplasm of breast  -     Mammo screening digital tomosynthesis bilateral w CAD; Future    5. History of shoulder replacement, unspecified laterality  -     Ambulatory Referral to Orthopedic Surgery             Follow Up   Return for Annual physical.  Patient was given instructions and counseling regarding her condition or for health maintenance advice. Please see specific information pulled into the AVS if appropriate.

## 2025-02-21 NOTE — ASSESSMENT & PLAN NOTE
Hypertension is stable and controlled  Continue current treatment regimen.  Dietary sodium restriction.  Weight loss.  Regular aerobic exercise.  Ambulatory blood pressure monitoring.  Blood pressure will be reassessed  next scheduled appt .

## 2025-02-25 ENCOUNTER — TELEPHONE (OUTPATIENT)
Dept: FAMILY MEDICINE CLINIC | Facility: CLINIC | Age: 60
End: 2025-02-25
Payer: OTHER GOVERNMENT

## 2025-02-25 NOTE — TELEPHONE ENCOUNTER
----- Message from Alejandro Fay sent at 2/25/2025  9:02 AM EST -----  Please inform patient that x-ray of elbow shows arthritis with diffuse brittle bone, as well as swelling of the elbow from fluid buildup. Make appt with Ortho from referral that was placed. I recommend use of elbow compression device, alternate tylenol and ibuprofen, alternate heat and ice. Please let me know if she has any questions. Thanks!

## 2025-02-25 NOTE — TELEPHONE ENCOUNTER
----- Message from Alejandro Fay sent at 2/24/2025  8:23 AM EST -----  Please inform pt that x-ray of shoulder shows history of total right shoulder arthroplasty with diffuse osteopenia. Her referral back to Dr. Garfield Crum was placed at previous appt. Please have her schedule follow up with him. Thanks!

## 2025-02-25 NOTE — TELEPHONE ENCOUNTER
"Relay     \"Please inform pt that x-ray of shoulder shows history of total right shoulder arthroplasty with diffuse osteopenia. Her referral back to Dr. Garfield Crum was placed at previous appt. Please have her schedule follow up with him. Thanks!     Please inform patient that x-ray of elbow shows arthritis with diffuse brittle bone, as well as swelling of the elbow from fluid buildup. Make appt with Ortho from referral that was placed. I recommend use of elbow compression device, alternate tylenol and ibuprofen, alternate heat and ice. Please let me know if she has any questions. Thanks! \"                "

## 2025-03-11 ENCOUNTER — HOSPITAL ENCOUNTER (OUTPATIENT)
Dept: MAMMOGRAPHY | Facility: HOSPITAL | Age: 60
Discharge: HOME | End: 2025-03-11
Payer: OTHER GOVERNMENT

## 2025-03-11 DIAGNOSIS — Z12.31 ENCOUNTER FOR SCREENING MAMMOGRAM FOR MALIGNANT NEOPLASM OF BREAST: ICD-10-CM

## 2025-03-11 PROCEDURE — 77063 BREAST TOMOSYNTHESIS BI: CPT

## 2025-03-11 PROCEDURE — 77067 SCR MAMMO BI INCL CAD: CPT

## 2025-03-13 ENCOUNTER — RESULTS FOLLOW-UP (OUTPATIENT)
Dept: MAMMOGRAPHY | Facility: HOSPITAL | Age: 60
End: 2025-03-13
Payer: OTHER GOVERNMENT

## 2025-03-13 DIAGNOSIS — Z12.31 ENCOUNTER FOR SCREENING MAMMOGRAM FOR MALIGNANT NEOPLASM OF BREAST: ICD-10-CM

## 2025-03-13 NOTE — TELEPHONE ENCOUNTER
"Relay     \"Please inform patient of benign mammogram with recommended annual screening in 1 year. Please let me know if she has any questions. Thanks!     Will also send a MyChart message.\"                "

## 2025-03-27 DIAGNOSIS — I42.8 NONISCHEMIC CARDIOMYOPATHY: Primary | ICD-10-CM

## 2025-03-27 DIAGNOSIS — Z95.0 PRESENCE OF CARDIAC PACEMAKER: ICD-10-CM

## 2025-04-02 ENCOUNTER — PRE-ADMISSION TESTING (OUTPATIENT)
Dept: PREADMISSION TESTING | Facility: HOSPITAL | Age: 60
End: 2025-04-02
Payer: OTHER GOVERNMENT

## 2025-04-02 VITALS
TEMPERATURE: 98.5 F | RESPIRATION RATE: 16 BRPM | OXYGEN SATURATION: 99 % | WEIGHT: 198.7 LBS | SYSTOLIC BLOOD PRESSURE: 130 MMHG | HEART RATE: 83 BPM | BODY MASS INDEX: 35.21 KG/M2 | DIASTOLIC BLOOD PRESSURE: 100 MMHG | HEIGHT: 63 IN

## 2025-04-02 DIAGNOSIS — Z95.0 PRESENCE OF CARDIAC PACEMAKER: ICD-10-CM

## 2025-04-02 DIAGNOSIS — I42.8 NONISCHEMIC CARDIOMYOPATHY: ICD-10-CM

## 2025-04-02 LAB
ABO GROUP BLD: NORMAL
ANION GAP SERPL CALCULATED.3IONS-SCNC: 9.8 MMOL/L (ref 5–15)
BLD GP AB SCN SERPL QL: NEGATIVE
BUN SERPL-MCNC: 14 MG/DL (ref 8–23)
BUN/CREAT SERPL: 15.2 (ref 7–25)
CALCIUM SPEC-SCNC: 8.7 MG/DL (ref 8.6–10.5)
CHLORIDE SERPL-SCNC: 105 MMOL/L (ref 98–107)
CO2 SERPL-SCNC: 27.2 MMOL/L (ref 22–29)
CREAT SERPL-MCNC: 0.92 MG/DL (ref 0.57–1)
DEPRECATED RDW RBC AUTO: 48.4 FL (ref 37–54)
EGFRCR SERPLBLD CKD-EPI 2021: 71.4 ML/MIN/1.73
ERYTHROCYTE [DISTWIDTH] IN BLOOD BY AUTOMATED COUNT: 15.9 % (ref 12.3–15.4)
GLUCOSE SERPL-MCNC: 100 MG/DL (ref 65–99)
HCT VFR BLD AUTO: 37.9 % (ref 34–46.6)
HGB BLD-MCNC: 12.5 G/DL (ref 12–15.9)
MCH RBC QN AUTO: 27.7 PG (ref 26.6–33)
MCHC RBC AUTO-ENTMCNC: 33 G/DL (ref 31.5–35.7)
MCV RBC AUTO: 84 FL (ref 79–97)
PLATELET # BLD AUTO: 246 10*3/MM3 (ref 140–450)
PMV BLD AUTO: 8.9 FL (ref 6–12)
POTASSIUM SERPL-SCNC: 4.1 MMOL/L (ref 3.5–5.2)
RBC # BLD AUTO: 4.51 10*6/MM3 (ref 3.77–5.28)
RH BLD: POSITIVE
SODIUM SERPL-SCNC: 142 MMOL/L (ref 136–145)
T&S EXPIRATION DATE: NORMAL
WBC NRBC COR # BLD AUTO: 6.29 10*3/MM3 (ref 3.4–10.8)

## 2025-04-02 PROCEDURE — 86850 RBC ANTIBODY SCREEN: CPT

## 2025-04-02 PROCEDURE — 80048 BASIC METABOLIC PNL TOTAL CA: CPT

## 2025-04-02 PROCEDURE — 86900 BLOOD TYPING SEROLOGIC ABO: CPT

## 2025-04-02 PROCEDURE — 86901 BLOOD TYPING SEROLOGIC RH(D): CPT

## 2025-04-02 PROCEDURE — 85027 COMPLETE CBC AUTOMATED: CPT

## 2025-04-02 PROCEDURE — 36415 COLL VENOUS BLD VENIPUNCTURE: CPT

## 2025-04-02 NOTE — DISCHARGE INSTRUCTIONS
Take the following medications the morning of surgery with a small sip of water: LEVOTHYROXINE, METOPROLOL, PROTONIX. HOLD VALSARTAN EVENING PRIOR TO SURGERY.  XARELTO HELD 3 DAYS PRIOR TO SURGERY      If you are on prescription narcotic pain medication to control your pain you may also take that medication the morning of surgery.    General Instructions:  Do not eat or drink anything after midnight the night before surgery.  Bring any papers given to you in the doctor’s office.  Wear clean comfortable clothes.  Do not wear contact lenses, false eyelashes or make-up.  Bring a case for your glasses.   Remove all piercings.  Leave jewelry and any other valuables at home.  The Pre-Admission Testing nurse will instruct you to bring medications if unable to obtain an accurate list in Pre-Admission Testing.    Day of surgery you will need to let the preoperative nurse know the last time you took each of your medications.  To ensure a safe environment for patients and staff, we kindly ask that children under the age of 16 not accompany patients.  If you must bring a dependent child or dependent adult please ensure a responsible adult, other than yourself, is present to supervise them.      If you were given a blood bank ID arm band remember to bring it with you the day of surgery.    Preventing a Surgical Site Infection:  For 2 to 3 days before surgery, avoid shaving with a razor because the razor can irritate skin and make it easier to develop an infection.    Any areas of open skin can increase the risk of a post-operative wound infection by allowing bacteria to enter and travel throughout the body.  Notify your surgeon if you have any skin wounds / rashes even if it is not near the expected surgical site.  The area will need assessed to determine if surgery should be delayed until it is healed.  The night prior to surgery shower using a fresh bar of anti-bacterial soap (such as Dial) and clean washcloth.  Sleep in a  clean bed with clean clothing.  Do not allow pets to sleep with you.  Shower on the morning of surgery using a fresh bar of anti-bacterial soap (such as Dial) and clean washcloth.  Dry with a clean towel and dress in clean clothing.  Ask your surgeon if you will be receiving antibiotics prior to surgery.  Make sure you, your family, and all healthcare providers clean their hands with soap and water or an alcohol based hand  before caring for you or your wound.    Day of surgery:  Your arrival time is approximately two hours before your scheduled surgery time.  Please note if you have an early arrival time the surgery doors do not open before 5:00 AM.  Upon arrival, a Pre-op nurse and Anesthesiologist will review your health history, obtain vital signs, and answer questions you may have.  The only belongings needed at this time will be your home medications and if applicable your C-PAP/BI-PAP machine.  A Pre-op nurse will start an IV and you may receive medication in preparation for surgery, including something to help you relax.      Please be aware that surgery does come with discomfort.  We want to make every effort to control your discomfort so please discuss any uncontrolled symptoms with your nurse.   Your doctor will most likely have prescribed pain medications.      If you are going home after surgery you will receive individualized written care instructions before being discharged.  A responsible adult must drive you to and from the hospital on the day of your surgery and ideally stay with you through the night.  Discharge prescriptions can be filled by the hospital pharmacy during regular pharmacy hours.  If you are having surgery late in the day/evening your prescription may be e-prescribed to your pharmacy.  Please verify your pharmacy hours or chose a 24 hour pharmacy to avoid not having access to your prescription because your pharmacy has closed for the day.    If you are staying overnight  following surgery, you will be transported to your hospital room following the recovery period.  [facility] has all private rooms.    If you have any questions please call Pre-Admission Testing at [phone].  Deductibles and co-payments are collected on the day of service. Please be prepared to pay the required co-pay, deductible or deposit on the day of service as defined by your plan.    Call your surgeon immediately if you experience any of the following symptoms:  Sore Throat  Shortness of Breath or difficulty breathing  Cough  Chills  Body soreness or muscle pain  Headache  Fever  New loss of taste or smell  Do not arrive for your surgery ill.  Your procedure will need to be rescheduled to another time.  You will need to call your physician before the day of surgery to avoid any unnecessary exposure to hospital staff as well as other patients.

## 2025-04-07 ENCOUNTER — HOSPITAL ENCOUNTER (OUTPATIENT)
Facility: HOSPITAL | Age: 60
Discharge: HOME | End: 2025-04-08
Attending: INTERNAL MEDICINE | Admitting: INTERNAL MEDICINE
Payer: OTHER GOVERNMENT

## 2025-04-07 ENCOUNTER — APPOINTMENT (OUTPATIENT)
Dept: GENERAL RADIOLOGY | Facility: HOSPITAL | Age: 60
End: 2025-04-07
Payer: OTHER GOVERNMENT

## 2025-04-07 ENCOUNTER — ANESTHESIA EVENT (OUTPATIENT)
Dept: PERIOP | Facility: HOSPITAL | Age: 60
End: 2025-04-07
Payer: OTHER GOVERNMENT

## 2025-04-07 ENCOUNTER — ANESTHESIA (OUTPATIENT)
Dept: PERIOP | Facility: HOSPITAL | Age: 60
End: 2025-04-07
Payer: OTHER GOVERNMENT

## 2025-04-07 ENCOUNTER — ANCILLARY PROCEDURE (OUTPATIENT)
Dept: PERIOP | Facility: HOSPITAL | Age: 60
End: 2025-04-07
Payer: OTHER GOVERNMENT

## 2025-04-07 PROBLEM — I44.2 AV BLOCK, COMPLETE: Status: ACTIVE | Noted: 2025-04-07

## 2025-04-07 LAB — ACT BLD: 158 SECONDS (ref 82–152)

## 2025-04-07 PROCEDURE — 93005 ELECTROCARDIOGRAM TRACING: CPT | Performed by: INTERNAL MEDICINE

## 2025-04-07 PROCEDURE — C1894 INTRO/SHEATH, NON-LASER: HCPCS | Performed by: INTERNAL MEDICINE

## 2025-04-07 PROCEDURE — C1893 INTRO/SHEATH, FIXED,NON-PEEL: HCPCS | Performed by: INTERNAL MEDICINE

## 2025-04-07 PROCEDURE — 25010000002 HYDROMORPHONE PER 4 MG: Performed by: ANESTHESIOLOGY

## 2025-04-07 PROCEDURE — 25010000002 DEXAMETHASONE SODIUM PHOSPHATE 20 MG/5ML SOLUTION: Performed by: ANESTHESIOLOGY

## 2025-04-07 PROCEDURE — 25010000002 MIDAZOLAM PER 1 MG: Performed by: ANESTHESIOLOGY

## 2025-04-07 PROCEDURE — G0378 HOSPITAL OBSERVATION PER HR: HCPCS

## 2025-04-07 PROCEDURE — C1898 LEAD, PMKR, OTHER THAN TRANS: HCPCS | Performed by: INTERNAL MEDICINE

## 2025-04-07 PROCEDURE — 25010000002 FENTANYL CITRATE (PF) 50 MCG/ML SOLUTION: Performed by: ANESTHESIOLOGY

## 2025-04-07 PROCEDURE — C2621 PMKR, OTHER THAN SING/DUAL: HCPCS | Performed by: INTERNAL MEDICINE

## 2025-04-07 PROCEDURE — 25010000002 FAMOTIDINE 10 MG/ML SOLUTION: Performed by: ANESTHESIOLOGY

## 2025-04-07 PROCEDURE — 71045 X-RAY EXAM CHEST 1 VIEW: CPT

## 2025-04-07 PROCEDURE — 85347 COAGULATION TIME ACTIVATED: CPT

## 2025-04-07 PROCEDURE — 25010000002 NEOSTIGMINE 5 MG/10ML SOLUTION: Performed by: ANESTHESIOLOGY

## 2025-04-07 PROCEDURE — 82803 BLOOD GASES ANY COMBINATION: CPT

## 2025-04-07 PROCEDURE — 93010 ELECTROCARDIOGRAM REPORT: CPT | Performed by: INTERNAL MEDICINE

## 2025-04-07 PROCEDURE — C2628 CATHETER, OCCLUSION: HCPCS | Performed by: INTERNAL MEDICINE

## 2025-04-07 PROCEDURE — 33233 REMOVAL OF PM GENERATOR: CPT | Performed by: INTERNAL MEDICINE

## 2025-04-07 PROCEDURE — 33207 INSERT HEART PM VENTRICULAR: CPT | Performed by: INTERNAL MEDICINE

## 2025-04-07 PROCEDURE — 25810000003 SODIUM CHLORIDE 0.9 % SOLUTION: Performed by: ANESTHESIOLOGY

## 2025-04-07 PROCEDURE — 33235 REMOVAL PACEMAKER ELECTRODE: CPT | Performed by: INTERNAL MEDICINE

## 2025-04-07 PROCEDURE — 82947 ASSAY GLUCOSE BLOOD QUANT: CPT

## 2025-04-07 PROCEDURE — 85018 HEMOGLOBIN: CPT

## 2025-04-07 PROCEDURE — 85014 HEMATOCRIT: CPT

## 2025-04-07 PROCEDURE — 25010000002 GLYCOPYRROLATE 1 MG/5ML SOLUTION: Performed by: ANESTHESIOLOGY

## 2025-04-07 PROCEDURE — C1769 GUIDE WIRE: HCPCS | Performed by: INTERNAL MEDICINE

## 2025-04-07 PROCEDURE — 25010000002 EPINEPHRINE 1 MG/10ML SOLUTION PREFILLED SYRINGE: Performed by: ANESTHESIOLOGY

## 2025-04-07 PROCEDURE — C1773 RET DEV, INSERTABLE: HCPCS | Performed by: INTERNAL MEDICINE

## 2025-04-07 PROCEDURE — 25810000003 SODIUM CHLORIDE 0.9 % SOLUTION 250 ML FLEX CONT: Performed by: ANESTHESIOLOGY

## 2025-04-07 PROCEDURE — C1760 CLOSURE DEV, VASC: HCPCS | Performed by: INTERNAL MEDICINE

## 2025-04-07 PROCEDURE — 25010000002 CEFAZOLIN PER 500 MG: Performed by: INTERNAL MEDICINE

## 2025-04-07 PROCEDURE — S0260 H&P FOR SURGERY: HCPCS | Performed by: INTERNAL MEDICINE

## 2025-04-07 PROCEDURE — C2629 INTRO/SHEATH, LASER: HCPCS | Performed by: INTERNAL MEDICINE

## 2025-04-07 PROCEDURE — 25010000002 LIDOCAINE 2% SOLUTION: Performed by: ANESTHESIOLOGY

## 2025-04-07 PROCEDURE — C1887 CATHETER, GUIDING: HCPCS | Performed by: INTERNAL MEDICINE

## 2025-04-07 PROCEDURE — 25810000003 LACTATED RINGERS PER 1000 ML: Performed by: ANESTHESIOLOGY

## 2025-04-07 PROCEDURE — C1889 IMPLANT/INSERT DEVICE, NOC: HCPCS | Performed by: INTERNAL MEDICINE

## 2025-04-07 PROCEDURE — 25010000002 ONDANSETRON PER 1 MG: Performed by: ANESTHESIOLOGY

## 2025-04-07 PROCEDURE — 25010000002 PROPOFOL 10 MG/ML EMULSION: Performed by: ANESTHESIOLOGY

## 2025-04-07 PROCEDURE — 25010000002 PHENYLEPHRINE 10 MG/ML SOLUTION 5 ML VIAL: Performed by: ANESTHESIOLOGY

## 2025-04-07 DEVICE — ENV PM AIGISRX ANTIBAC RESORB 2.9X3.3IN LG: Type: IMPLANTABLE DEVICE | Site: HEART | Status: FUNCTIONAL

## 2025-04-07 DEVICE — LD PM CAPSUREFIX NOVUS5076 58CM: Type: IMPLANTABLE DEVICE | Site: HEART | Status: FUNCTIONAL

## 2025-04-07 DEVICE — GEN PM PERCEPTA MRI CRTP: Type: IMPLANTABLE DEVICE | Site: CHEST | Status: FUNCTIONAL

## 2025-04-07 RX ORDER — SODIUM CHLORIDE 0.9 % (FLUSH) 0.9 %
3-10 SYRINGE (ML) INJECTION AS NEEDED
Status: DISCONTINUED | OUTPATIENT
Start: 2025-04-07 | End: 2025-04-07 | Stop reason: HOSPADM

## 2025-04-07 RX ORDER — DIPHENHYDRAMINE HYDROCHLORIDE 50 MG/ML
12.5 INJECTION, SOLUTION INTRAMUSCULAR; INTRAVENOUS
Status: DISCONTINUED | OUTPATIENT
Start: 2025-04-07 | End: 2025-04-07 | Stop reason: HOSPADM

## 2025-04-07 RX ORDER — PROMETHAZINE HYDROCHLORIDE 25 MG/1
25 SUPPOSITORY RECTAL ONCE AS NEEDED
Status: DISCONTINUED | OUTPATIENT
Start: 2025-04-07 | End: 2025-04-07 | Stop reason: HOSPADM

## 2025-04-07 RX ORDER — METOPROLOL TARTRATE 25 MG/1
25 TABLET, FILM COATED ORAL 2 TIMES DAILY
Status: DISCONTINUED | OUTPATIENT
Start: 2025-04-07 | End: 2025-04-08 | Stop reason: HOSPADM

## 2025-04-07 RX ORDER — PROPOFOL 10 MG/ML
VIAL (ML) INTRAVENOUS AS NEEDED
Status: DISCONTINUED | OUTPATIENT
Start: 2025-04-07 | End: 2025-04-07 | Stop reason: SURG

## 2025-04-07 RX ORDER — EPHEDRINE SULFATE 50 MG/ML
5 INJECTION, SOLUTION INTRAVENOUS ONCE AS NEEDED
Status: DISCONTINUED | OUTPATIENT
Start: 2025-04-07 | End: 2025-04-07 | Stop reason: HOSPADM

## 2025-04-07 RX ORDER — ACETAMINOPHEN 500 MG
1000 TABLET ORAL ONCE
Status: COMPLETED | OUTPATIENT
Start: 2025-04-07 | End: 2025-04-07

## 2025-04-07 RX ORDER — VALSARTAN 80 MG/1
80 TABLET ORAL NIGHTLY
Status: DISCONTINUED | OUTPATIENT
Start: 2025-04-08 | End: 2025-04-08 | Stop reason: HOSPADM

## 2025-04-07 RX ORDER — MIDAZOLAM HYDROCHLORIDE 1 MG/ML
1 INJECTION, SOLUTION INTRAMUSCULAR; INTRAVENOUS
Status: DISCONTINUED | OUTPATIENT
Start: 2025-04-07 | End: 2025-04-07 | Stop reason: HOSPADM

## 2025-04-07 RX ORDER — FENTANYL CITRATE 50 UG/ML
25 INJECTION, SOLUTION INTRAMUSCULAR; INTRAVENOUS
Status: DISCONTINUED | OUTPATIENT
Start: 2025-04-07 | End: 2025-04-07 | Stop reason: HOSPADM

## 2025-04-07 RX ORDER — FENTANYL CITRATE 50 UG/ML
INJECTION, SOLUTION INTRAMUSCULAR; INTRAVENOUS AS NEEDED
Status: DISCONTINUED | OUTPATIENT
Start: 2025-04-07 | End: 2025-04-07 | Stop reason: SURG

## 2025-04-07 RX ORDER — HYDROMORPHONE HYDROCHLORIDE 1 MG/ML
0.25 INJECTION, SOLUTION INTRAMUSCULAR; INTRAVENOUS; SUBCUTANEOUS
Status: DISCONTINUED | OUTPATIENT
Start: 2025-04-07 | End: 2025-04-07 | Stop reason: HOSPADM

## 2025-04-07 RX ORDER — PANTOPRAZOLE SODIUM 40 MG/1
40 TABLET, DELAYED RELEASE ORAL DAILY
Status: DISCONTINUED | OUTPATIENT
Start: 2025-04-08 | End: 2025-04-08 | Stop reason: HOSPADM

## 2025-04-07 RX ORDER — GLYCOPYRROLATE 0.2 MG/ML
INJECTION INTRAMUSCULAR; INTRAVENOUS AS NEEDED
Status: DISCONTINUED | OUTPATIENT
Start: 2025-04-07 | End: 2025-04-07 | Stop reason: SURG

## 2025-04-07 RX ORDER — ONDANSETRON 2 MG/ML
INJECTION INTRAMUSCULAR; INTRAVENOUS AS NEEDED
Status: DISCONTINUED | OUTPATIENT
Start: 2025-04-07 | End: 2025-04-07 | Stop reason: SURG

## 2025-04-07 RX ORDER — NALOXONE HCL 0.4 MG/ML
0.2 VIAL (ML) INJECTION AS NEEDED
Status: DISCONTINUED | OUTPATIENT
Start: 2025-04-07 | End: 2025-04-07 | Stop reason: HOSPADM

## 2025-04-07 RX ORDER — HYDRALAZINE HYDROCHLORIDE 20 MG/ML
5 INJECTION INTRAMUSCULAR; INTRAVENOUS
Status: DISCONTINUED | OUTPATIENT
Start: 2025-04-07 | End: 2025-04-07 | Stop reason: HOSPADM

## 2025-04-07 RX ORDER — ROCURONIUM BROMIDE 10 MG/ML
INJECTION, SOLUTION INTRAVENOUS AS NEEDED
Status: DISCONTINUED | OUTPATIENT
Start: 2025-04-07 | End: 2025-04-07 | Stop reason: SURG

## 2025-04-07 RX ORDER — LIDOCAINE HYDROCHLORIDE 20 MG/ML
INJECTION, SOLUTION INFILTRATION; PERINEURAL AS NEEDED
Status: DISCONTINUED | OUTPATIENT
Start: 2025-04-07 | End: 2025-04-07 | Stop reason: SURG

## 2025-04-07 RX ORDER — LEFLUNOMIDE 20 MG/1
20 TABLET ORAL DAILY
Status: DISCONTINUED | OUTPATIENT
Start: 2025-04-07 | End: 2025-04-08 | Stop reason: HOSPADM

## 2025-04-07 RX ORDER — DROPERIDOL 2.5 MG/ML
0.62 INJECTION, SOLUTION INTRAMUSCULAR; INTRAVENOUS
Status: DISCONTINUED | OUTPATIENT
Start: 2025-04-07 | End: 2025-04-07 | Stop reason: HOSPADM

## 2025-04-07 RX ORDER — ATROPINE SULFATE 0.4 MG/ML
0.4 INJECTION, SOLUTION INTRAMUSCULAR; INTRAVENOUS; SUBCUTANEOUS ONCE AS NEEDED
Status: DISCONTINUED | OUTPATIENT
Start: 2025-04-07 | End: 2025-04-07 | Stop reason: HOSPADM

## 2025-04-07 RX ORDER — LABETALOL HYDROCHLORIDE 5 MG/ML
5 INJECTION, SOLUTION INTRAVENOUS
Status: DISCONTINUED | OUTPATIENT
Start: 2025-04-07 | End: 2025-04-07 | Stop reason: HOSPADM

## 2025-04-07 RX ORDER — PROMETHAZINE HYDROCHLORIDE 25 MG/1
25 TABLET ORAL ONCE AS NEEDED
Status: DISCONTINUED | OUTPATIENT
Start: 2025-04-07 | End: 2025-04-07 | Stop reason: HOSPADM

## 2025-04-07 RX ORDER — SODIUM CHLORIDE 9 MG/ML
40 INJECTION, SOLUTION INTRAVENOUS AS NEEDED
Status: DISCONTINUED | OUTPATIENT
Start: 2025-04-07 | End: 2025-04-07 | Stop reason: HOSPADM

## 2025-04-07 RX ORDER — SODIUM CHLORIDE 9 MG/ML
INJECTION, SOLUTION INTRAVENOUS CONTINUOUS PRN
Status: DISCONTINUED | OUTPATIENT
Start: 2025-04-07 | End: 2025-04-07 | Stop reason: SURG

## 2025-04-07 RX ORDER — IPRATROPIUM BROMIDE AND ALBUTEROL SULFATE 2.5; .5 MG/3ML; MG/3ML
3 SOLUTION RESPIRATORY (INHALATION) ONCE AS NEEDED
Status: DISCONTINUED | OUTPATIENT
Start: 2025-04-07 | End: 2025-04-07 | Stop reason: HOSPADM

## 2025-04-07 RX ORDER — FLUMAZENIL 0.1 MG/ML
0.2 INJECTION INTRAVENOUS AS NEEDED
Status: DISCONTINUED | OUTPATIENT
Start: 2025-04-07 | End: 2025-04-07 | Stop reason: HOSPADM

## 2025-04-07 RX ORDER — SODIUM CHLORIDE, SODIUM LACTATE, POTASSIUM CHLORIDE, CALCIUM CHLORIDE 600; 310; 30; 20 MG/100ML; MG/100ML; MG/100ML; MG/100ML
9 INJECTION, SOLUTION INTRAVENOUS CONTINUOUS
Status: ACTIVE | OUTPATIENT
Start: 2025-04-07 | End: 2025-04-07

## 2025-04-07 RX ORDER — SODIUM CHLORIDE 0.9 % (FLUSH) 0.9 %
3 SYRINGE (ML) INJECTION EVERY 12 HOURS SCHEDULED
Status: DISCONTINUED | OUTPATIENT
Start: 2025-04-07 | End: 2025-04-07 | Stop reason: HOSPADM

## 2025-04-07 RX ORDER — ONDANSETRON 2 MG/ML
4 INJECTION INTRAMUSCULAR; INTRAVENOUS ONCE AS NEEDED
Status: DISCONTINUED | OUTPATIENT
Start: 2025-04-07 | End: 2025-04-07 | Stop reason: HOSPADM

## 2025-04-07 RX ORDER — FAMOTIDINE 10 MG/ML
20 INJECTION, SOLUTION INTRAVENOUS ONCE
Status: COMPLETED | OUTPATIENT
Start: 2025-04-07 | End: 2025-04-07

## 2025-04-07 RX ORDER — NEOSTIGMINE METHYLSULFATE 0.5 MG/ML
INJECTION INTRAVENOUS AS NEEDED
Status: DISCONTINUED | OUTPATIENT
Start: 2025-04-07 | End: 2025-04-07 | Stop reason: SURG

## 2025-04-07 RX ORDER — DEXAMETHASONE SODIUM PHOSPHATE 4 MG/ML
INJECTION, SOLUTION INTRA-ARTICULAR; INTRALESIONAL; INTRAMUSCULAR; INTRAVENOUS; SOFT TISSUE AS NEEDED
Status: DISCONTINUED | OUTPATIENT
Start: 2025-04-07 | End: 2025-04-07 | Stop reason: SURG

## 2025-04-07 RX ORDER — HYDROCODONE BITARTRATE AND ACETAMINOPHEN 10; 325 MG/1; MG/1
1 TABLET ORAL EVERY 6 HOURS PRN
Status: DISCONTINUED | OUTPATIENT
Start: 2025-04-07 | End: 2025-04-08 | Stop reason: HOSPADM

## 2025-04-07 RX ORDER — FENTANYL CITRATE 50 UG/ML
50 INJECTION, SOLUTION INTRAMUSCULAR; INTRAVENOUS
Status: DISCONTINUED | OUTPATIENT
Start: 2025-04-07 | End: 2025-04-07 | Stop reason: HOSPADM

## 2025-04-07 RX ADMIN — Medication 3 ML: at 12:57

## 2025-04-07 RX ADMIN — FENTANYL CITRATE 50 MCG: 50 INJECTION, SOLUTION INTRAMUSCULAR; INTRAVENOUS at 11:46

## 2025-04-07 RX ADMIN — HYDROMORPHONE HYDROCHLORIDE 0.25 MG: 1 INJECTION, SOLUTION INTRAMUSCULAR; INTRAVENOUS; SUBCUTANEOUS at 17:54

## 2025-04-07 RX ADMIN — METOPROLOL TARTRATE 25 MG: 25 TABLET, FILM COATED ORAL at 19:51

## 2025-04-07 RX ADMIN — ROCURONIUM BROMIDE 10 MG: 10 INJECTION, SOLUTION INTRAVENOUS at 14:55

## 2025-04-07 RX ADMIN — ACETAMINOPHEN 1000 MG: 500 TABLET, FILM COATED ORAL at 11:34

## 2025-04-07 RX ADMIN — LIDOCAINE HYDROCHLORIDE 90 MG: 20 INJECTION, SOLUTION INFILTRATION; PERINEURAL at 13:13

## 2025-04-07 RX ADMIN — DEXAMETHASONE SODIUM PHOSPHATE 8 MG: 4 INJECTION, SOLUTION INTRAMUSCULAR; INTRAVENOUS at 13:23

## 2025-04-07 RX ADMIN — ROCURONIUM BROMIDE 10 MG: 10 INJECTION, SOLUTION INTRAVENOUS at 14:19

## 2025-04-07 RX ADMIN — PHENYLEPHRINE HYDROCHLORIDE 0.2 MCG/KG/MIN: 10 INJECTION, SOLUTION INTRAVENOUS at 13:56

## 2025-04-07 RX ADMIN — PROPOFOL 25 MCG/KG/MIN: 10 INJECTION, EMULSION INTRAVENOUS at 13:14

## 2025-04-07 RX ADMIN — NEOSTIGMINE METHYLSULFATE 4 MG: 0.5 INJECTION INTRAVENOUS at 16:45

## 2025-04-07 RX ADMIN — PROPOFOL 80 MG: 10 INJECTION, EMULSION INTRAVENOUS at 13:13

## 2025-04-07 RX ADMIN — FENTANYL CITRATE 25 MCG: 50 INJECTION, SOLUTION INTRAMUSCULAR; INTRAVENOUS at 17:30

## 2025-04-07 RX ADMIN — FAMOTIDINE 20 MG: 10 INJECTION, SOLUTION INTRAVENOUS at 11:34

## 2025-04-07 RX ADMIN — EPINEPHRINE 10 MCG: 0.1 INJECTION INTRACARDIAC; INTRAVENOUS at 15:33

## 2025-04-07 RX ADMIN — CEFAZOLIN 2000 MG: 2 INJECTION, POWDER, FOR SOLUTION INTRAMUSCULAR; INTRAVENOUS at 12:57

## 2025-04-07 RX ADMIN — ROCURONIUM BROMIDE 70 MG: 10 INJECTION, SOLUTION INTRAVENOUS at 13:13

## 2025-04-07 RX ADMIN — FENTANYL CITRATE 25 MCG: 50 INJECTION, SOLUTION INTRAMUSCULAR; INTRAVENOUS at 17:23

## 2025-04-07 RX ADMIN — ROCURONIUM BROMIDE 20 MG: 10 INJECTION, SOLUTION INTRAVENOUS at 15:21

## 2025-04-07 RX ADMIN — HYDROMORPHONE HYDROCHLORIDE 0.25 MG: 1 INJECTION, SOLUTION INTRAMUSCULAR; INTRAVENOUS; SUBCUTANEOUS at 18:14

## 2025-04-07 RX ADMIN — GLYCOPYRROLATE 0.7 MG: 0.2 INJECTION, SOLUTION INTRAMUSCULAR; INTRAVENOUS at 16:44

## 2025-04-07 RX ADMIN — PROPOFOL 40 MG: 10 INJECTION, EMULSION INTRAVENOUS at 13:16

## 2025-04-07 RX ADMIN — ONDANSETRON 4 MG: 2 INJECTION INTRAMUSCULAR; INTRAVENOUS at 16:35

## 2025-04-07 RX ADMIN — SODIUM CHLORIDE: 9 INJECTION, SOLUTION INTRAVENOUS at 13:04

## 2025-04-07 RX ADMIN — HYDROCODONE BITARTRATE AND ACETAMINOPHEN 1 TABLET: 10; 325 TABLET ORAL at 19:51

## 2025-04-07 RX ADMIN — SODIUM CHLORIDE, POTASSIUM CHLORIDE, SODIUM LACTATE AND CALCIUM CHLORIDE 9 ML/HR: 600; 310; 30; 20 INJECTION, SOLUTION INTRAVENOUS at 11:35

## 2025-04-07 RX ADMIN — FENTANYL CITRATE 50 MCG: 50 INJECTION, SOLUTION INTRAMUSCULAR; INTRAVENOUS at 13:13

## 2025-04-07 RX ADMIN — MIDAZOLAM 1 MG: 1 INJECTION INTRAMUSCULAR; INTRAVENOUS at 11:45

## 2025-04-07 NOTE — H&P
[facility]   HISTORY AND PHYSICAL    Patient Name:Whitney Schofield  : 1965  MRN: [id number]  Primary Care Physician: Sana Adame APRN  Date of admission: 2025    Subjective   Subjective     Chief Complaint:   RV lead failure    History of Present Illness   Whitney Schofield is a 59 y.o. female who presents today for heart block, lead malfunction     She is here today for routine follow-up.  She has a history of a CRT-D placed following previous heart surgery for ASD repair and tricuspid repair.     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     She has had largely routine device follow-up however the issue is that her thresholds continue to rise, and the impedances of all the leads are increasing steadily.  This is causing significant drain on her battery.     Has had normalization of her ejection fraction.  Her last echo was in .     She developed persistent atrial fibrillation, I cardioverted her, and she has remained in sinus rhythm now for greater than 6 months.    Review of Systems   Constitutional:  Negative for activity change and fatigue.   Eyes: Negative.    Respiratory:  Negative for chest tightness and shortness of breath.    Cardiovascular:  Negative for chest pain, palpitations and leg swelling.   Gastrointestinal: Negative.    Endocrine: Negative.    Genitourinary: Negative.    Musculoskeletal: Negative.    Skin: Negative.    Neurological:  Negative for dizziness and syncope.   Hematological: Negative.    Psychiatric/Behavioral: Negative.           Personal History     Past Medical History:   Diagnosis Date    Anemia     HISTORY OF.    ASD (atrial septal defect)     REPAIRED    Asthma     Asystole     Post ASD closure asystole and high-grade AV block.  s/p pacemaker 5/11/10.     Celiac disease     DVT (deep venous thrombosis)     Left subclavian DVT following right atrial pacemaker lead revision in 2/14    Edema     AT TIMES.    Hashimoto's thyroiditis     s/p partial thyroidectomy 1/13/10    Heart palpitations     History of atrial fibrillation     REASON FOR XARELTO. XARELTO TO BE HELD 3 DAYS PRIOR    History of migraine     Hx of thyroid cancer     Hypertension     AT TIMES.    Malignant neoplasm of thyroid gland     Papillary carcinoma of the thyroid - s/p residual thyroidectomy in 2/11.    Nonischemic cardiomyopathy     EF as low as 35% 3/13.  EF 6/21/13 by CHANDANA was 50-55%, and 53% by echo on 6/17/14.  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  EF 50% by CHANDANA on 4/18/16.    NSVT (nonsustained ventricular tachycardia)     Noted by event recorder 9/12.  EP study by Dr. Aguilar on 11/20/12 showed no inducible VT.    LUKAS on CPAP     NO MACHINE USED FOR YEARS    Pacemaker lead failure     Papillary thyroid carcinoma 08/27/2013    HAS SINCE HAD BOTH LOBES OF THYROID REMOVED    Rheumatoid arthritis     Severe and debilitating     Secundum ASD     s/p open closure with a PeriPatch xenograft by Dr. Harper on 5/6/10 by Dr. Harper at [facility].    Sleep apnea     SOB (shortness of breath)     Multifactorial     Thrombus due to any device, implant or graft     Extensive thrombus formation on the pacemaker leads by CHANDANA 3/25/13.  Initiated on Coumadin transiently at that time - improved significantly by CHANDANA 6/21/13.       Past Surgical History:   Procedure Laterality Date    ASD AND VSD REPAIR      BREAST BIOPSY Right     >20 years ago    CARDIAC ELECTROPHYSIOLOGY PROCEDURE N/A 01/18/2021    Procedure: Generator Change BI Ventriculat PPM- [employer]tronic;  Surgeon: Jose Mcdowell MD;  Location: [facility] INVASIVE LOCATION;  Service: Cardiology;  Laterality: N/A;    CHOLECYSTECTOMY  2007    ENDOSCOPY AND COLONOSCOPY  12/30/2015    Tom Orta MD    EXCISION MASS TRUNK Right  07/20/2018    Procedure: EXCISION MASS TRUNK, RUQ ABD WALL LIPOMA;  Surgeon: Eduar Marx MD;  Location: [facility];  Service: General    PACEMAKER IMPLANTATION  05/2010    Inital dual-chamber pacemaker placed 5/11/10 after ASD closure surgery.  Had LV lead placement and generator change on 8/19/13 by Dr. Aguilar (Medtronic #[id number]).  Then had right atrial lead revision on 2/19/14 (for right atrial lead dislodgement and diaphragmatic stimulation) - developed left subclavian DVT afterwards.  Then had laser lead extraction of the abandoned right atrial lead on 4/17/14.    REPLACEMENT TOTAL KNEE BILATERAL      THYROID LOBECTOMY Right 2012    THYROIDECTOMY Left 2011    Dr. Coronado, radioactive iodine X3    TONSILLECTOMY      TOTAL SHOULDER REVERSE ARTHROPLASTY      LEFT AND RIGHT    TRICUSPID VALVE SURGERY      #32 MC3 annuloplasty ring by Dr. Harper on 5/6/10 (at time of ASD closure)       Family History: Her family history includes Breast cancer in her maternal aunt; COPD in her mother; Coronary artery disease in her mother; Diabetes in her mother and sister; Heart failure in her mother; Hypertension in her mother; Lung disease in her mother.     Social History: She  reports that she has never smoked. She has never been exposed to tobacco smoke. She has never used smokeless tobacco. She reports that she does not drink alcohol and does not use drugs.    Home Medications:  Ferrous Sulfate Dried, HYDROcodone-acetaminophen, Tocilizumab, bumetanide, folic acid, leflunomide, levothyroxine, methotrexate, metoprolol tartrate, pantoprazole, rivaroxaban, valsartan, and vitamin D    Allergies:  She is allergic to ferumoxytol, azithromycin, and sugammadex.    Objective    Objective     Vitals:    Temp:  [98.5 °F (36.9 °C)] 98.5 °F (36.9 °C)  Heart Rate:  [53-65] 65  Resp:  [16-17] 17  BP: (150-155)/(84-89) 150/89    Physical Exam  Vitals and nursing note reviewed.   Constitutional:       General: She is not in acute distress.      Appearance: She is not diaphoretic.   HENT:      Mouth/Throat:      Pharynx: No oropharyngeal exudate.   Eyes:      General: No scleral icterus.  Neck:      Trachea: No tracheal deviation.   Cardiovascular:      Rate and Rhythm: Normal rate and regular rhythm.   Pulmonary:      Effort: Pulmonary effort is normal. No respiratory distress.      Breath sounds: Normal breath sounds.   Chest:      Comments: Pacemaker in place on left chest  Abdominal:      General: There is no distension.      Palpations: Abdomen is soft.   Skin:     General: Skin is warm and dry.   Neurological:      Mental Status: She is alert and oriented to person, place, and time.   Psychiatric:         Behavior: Behavior normal.         Thought Content: Thought content normal.         Judgment: Judgment normal.        Result Review    Result Review:  I have personally reviewed the results from the time of this admission to 4/7/2025 11:53 EDT and agree with these findings:  []  Laboratory list / accordion  []  Microbiology  []  Radiology  []  EKG/Telemetry   []  Cardiology/Vascular   []  Pathology  []  Old records  []  Other:  Most notable findings include: sinus with paced rhythm      Assessment & Plan   Assessment / Plan     Brief Patient Summary:  Whitney Schofield is a 59 y.o. female with complete heart block and CRT-P system.  The RV and LV lead have high thresholds and impedances.  We discussed lead management and have decided to extract RV lead and implant left bundle or new RV lead.     Active Hospital Problems:  Active Hospital Problems    Diagnosis     **Pacemaker lead malfunction      Plan:   We discussed lead extraction.     We discussed risk of infection and cardiac injury including the need for possible cardiac emergency surgery    She is agreeable to proceeding.     Eduar Lizama MD

## 2025-04-07 NOTE — ANESTHESIA PROCEDURE NOTES
Arterial Line      Patient reassessed immediately prior to procedure    Patient location during procedure: OR   Line placed for hemodynamic monitoring and ABGs/Labs/ISTAT.  Performed By   Anesthesiologist: Castillo Tirado MD   Preanesthetic Checklist  Completed: patient identified, IV checked, site marked, risks and benefits discussed, surgical consent, monitors and equipment checked, pre-op evaluation and timeout performed  Arterial Line Prep    Sterile Tech: cap, gloves, sterile barriers and mask  Prep: ChloraPrep  Patient monitoring: EKG, continuous pulse oximetry and blood pressure monitoring  Arterial Line Procedure   Laterality:right  Location:  radial artery  Catheter size: 20 G   Guidance: palpation technique  Number of attempts: 1  Successful placement: yes Images: still images not obtained  Post Assessment   Dressing Type: wrist guard applied, secured with tape and occlusive dressing applied.   Complications no  Circ/Move/Sens Assessment: normal and unchanged.   Patient Tolerance: patient tolerated the procedure well with no apparent complications

## 2025-04-07 NOTE — BRIEF OP NOTE
AUTOMATIC IMPLANTABLE CARDIOVERTER DEFIBRILLATOR LEAD REPLACEMENT POCKET REVISION WITH/WITHOUT LASER EXTRACTION  Progress Note    Whitney Schofield  4/7/2025    Pre-op Diagnosis:   RV lead malfunction       Post-Op Diagnosis Codes:     * Pacemaker lead malfunction, initial encounter [T82.110A]    Procedure(s):      Procedure(s):  PACEMAKER REPLACEMENT AND LEAD REPLACEMENT WITH LASER EXTRACTION        Surgeon(s):  Eduar Lizama MD    Anesthesia: General    Staff:   Circulator: Cayla Randall RN; Darshana Marin RN  Perfusionist: Dressler Hall, Hillary; Volodymyr Alanis (Arnaldo)  Scrub Person: Zahra Navarrete; Felix Keller  Invasive Nurse: Rosalee Bills RN; Yaritza Mcghee RN  Assistant: Berkley Zepeda PA  Assistant: Berkley Zepeda PA    Estimated Blood Loss: 100ml    Urine Voided: * No values recorded between 4/7/2025  1:03 PM and 4/7/2025  4:52 PM *    Specimens:                None      Drains: * No LDAs found *    Findings:     The patient had a malfunctioning RV lead with high impedance and threshold.  This was resulting in significant battery drain.  She has a history of AV block following ASD repair and tricuspid repair.  She has a CRT-P system.  We performed extraction today.  The goal was to remove the RV lead.  We used laser and a femoral snare.  Extraction went well, but there was dense adhesions to the tricuspid valve.  I think we injured the valve, as there was significant worsening of TR.  We implanted a new RV lead.  We attempted conduction pacing, but could not find a satisfactory location.       Complications: Worsening of tricuspid regurgitation      Eduar Lizama MD     Date: 4/7/2025  Time: 16:59 EDT

## 2025-04-07 NOTE — ANESTHESIA PREPROCEDURE EVALUATION
Anesthesia Evaluation     Patient summary reviewed   NPO Solid Status: > 8 hours  NPO Liquid Status: > 2 hours           Airway   Mallampati: II  TM distance: >3 FB  Neck ROM: full  No difficulty expected  Dental    (+) poor dentition        Pulmonary    (+) asthma,sleep apnea on CPAP  Cardiovascular     NYHA Classification: I  ECG reviewed  Rhythm: irregular    (+) pacemaker pacemaker, hypertension, dysrhythmias Atrial Fib, DVT resolved      Neuro/Psych  GI/Hepatic/Renal/Endo    (+) obesity, thyroid problem hypothyroidism    Musculoskeletal     Abdominal    Substance History      OB/GYN          Other   arthritis,                 Anesthesia Plan    ASA 3     general and Bety     intravenous induction     Anesthetic plan, risks, benefits, and alternatives have been provided, discussed and informed consent has been obtained with: patient.    Use of blood products discussed with patient  Consented to blood products.      CODE STATUS:

## 2025-04-07 NOTE — ANESTHESIA PROCEDURE NOTES
Diagnostic IntraOp Martinez    Procedure Performed: Diagnostic IntraOp Martinez       Start Time:  4/7/2025 1:38 PM       End Time:   4/7/2025 1:38 PM    Preanesthesia Checklist:  Patient identified, IV assessed, risks and benefits discussed, monitors and equipment assessed, procedure being performed at surgeon's request and anesthesia consent obtained.    General Procedure Information  MARTINEZ Placed for monitoring purposes only -- This is not a diagnostic MARTINEZ  Diagnostic Indications for Echo:  assessment of ascending aorta, assessment of surgical repair, defect repair evaluation and hemodynamic monitoring  Location performed:  OR  Intubated  Bite block placed  Heart visualized  Probe Insertion:  Easy  Probe Type:  Multiplane  Modalities:  2D only, color flow mapping, continuous wave Doppler and pulse wave Doppler

## 2025-04-07 NOTE — ANESTHESIA PROCEDURE NOTES
Airway  Reason: elective    Date/Time: 4/7/2025 1:18 PM  Difficult airway    General Information and Staff    Patient location during procedure: OR  Anesthesiologist: Madai Pickett MD    Indications and Patient Condition  Indications for airway management: airway protection    Preoxygenated: yes  MILS maintained throughout    Mask difficulty assessment: 2 - vent by mask + OA or adjuvant +/- NMBA    Final Airway Details    Final airway type: endotracheal airway      Successful airway: ETT  Cuffed: yes   Successful intubation technique: video laryngoscopy and direct laryngoscopy  Endotracheal tube insertion site: oral  Blade: Cynthia  Blade size: 3  ETT size (mm): 7.5  Cormack-Lehane Classification: grade IIb - view of arytenoids or posterior of glottis only  Placement verified by: chest auscultation and capnometry   Measured from: teeth  Number of attempts at approach: 2  Assessment: lips, teeth, and gum same as pre-op and atraumatic intubation    Additional Comments  DL with MAC 3 on first attempt, solid grade III view that could not be improved.  CMAC D blade with 2b view, still very anterior, tube able to be passed with anterior pressure and extra bend on the metal stylet.

## 2025-04-07 NOTE — LETTER
April 7, 2025      [facility] CARDIOVASC UNIT  [address] [facility] [address]  [phone]          Patient: Whitney Schofield   YOB: 1965   Date of Visit: 2/20/2025       To Whom It May Concern:    Whitney Schofield was seen at [facility] CARDIOVASC UNIT on 2/20/2025.    Please excuse {AMB PED EXCUSE CAREGIVER:20375} from work {AMB PED EXCUSE WHEN:20401}.        Sincerely,       No name on file

## 2025-04-07 NOTE — Clinical Note
April 7, 2025     Patient: Whitney QUIROZ Chandu   YOB: 1965   Date of Visit: 2/20/2025       To Whom It May Concern:    It is my medical opinion that Whitney Chandu {Work release (duty restriction):05017}.           Sincerely,        No name on file    CC: No Recipients

## 2025-04-08 ENCOUNTER — READMISSION MANAGEMENT (OUTPATIENT)
Dept: CALL CENTER | Facility: HOSPITAL | Age: 60
End: 2025-04-08
Payer: OTHER GOVERNMENT

## 2025-04-08 VITALS
HEART RATE: 81 BPM | DIASTOLIC BLOOD PRESSURE: 69 MMHG | OXYGEN SATURATION: 98 % | TEMPERATURE: 98.1 F | RESPIRATION RATE: 18 BRPM | SYSTOLIC BLOOD PRESSURE: 95 MMHG

## 2025-04-08 LAB
ANION GAP SERPL CALCULATED.3IONS-SCNC: 13 MMOL/L (ref 5–15)
BASE EXCESS BLDA CALC-SCNC: -1 MMOL/L (ref -5–5)
BUN SERPL-MCNC: 14 MG/DL (ref 8–23)
BUN/CREAT SERPL: 15.7 (ref 7–25)
CALCIUM SPEC-SCNC: 8.1 MG/DL (ref 8.6–10.5)
CHLORIDE SERPL-SCNC: 105 MMOL/L (ref 98–107)
CO2 BLDA-SCNC: 25 MMOL/L (ref 24–29)
CO2 SERPL-SCNC: 24 MMOL/L (ref 22–29)
CREAT SERPL-MCNC: 0.89 MG/DL (ref 0.57–1)
DEPRECATED RDW RBC AUTO: 47.9 FL (ref 37–54)
EGFRCR SERPLBLD CKD-EPI 2021: 74.3 ML/MIN/1.73
ERYTHROCYTE [DISTWIDTH] IN BLOOD BY AUTOMATED COUNT: 15.8 % (ref 12.3–15.4)
GLUCOSE BLDC GLUCOMTR-MCNC: 100 MG/DL (ref 70–130)
GLUCOSE SERPL-MCNC: 160 MG/DL (ref 65–99)
HCO3 BLDA-SCNC: 23.8 MMOL/L (ref 22–26)
HCT VFR BLD AUTO: 36.3 % (ref 34–46.6)
HCT VFR BLDA CALC: 32 % (ref 38–51)
HGB BLD-MCNC: 11.4 G/DL (ref 12–15.9)
HGB BLDA-MCNC: 10.9 G/DL (ref 12–17)
MCH RBC QN AUTO: 26.5 PG (ref 26.6–33)
MCHC RBC AUTO-ENTMCNC: 31.4 G/DL (ref 31.5–35.7)
MCV RBC AUTO: 84.4 FL (ref 79–97)
PCO2 BLDA: 37.7 MM HG (ref 35–45)
PH BLDA: 7.41 PH UNITS (ref 7.35–7.6)
PLATELET # BLD AUTO: 216 10*3/MM3 (ref 140–450)
PMV BLD AUTO: 9.3 FL (ref 6–12)
PO2 BLDA: 256 MMHG (ref 80–105)
POTASSIUM BLDA-SCNC: 3.6 MMOL/L (ref 3.5–4.9)
POTASSIUM SERPL-SCNC: 4.2 MMOL/L (ref 3.5–5.2)
QT INTERVAL: 471 MS
QTC INTERVAL: 542 MS
RBC # BLD AUTO: 4.3 10*6/MM3 (ref 3.77–5.28)
SAO2 % BLDA: 100 % (ref 95–98)
SODIUM SERPL-SCNC: 142 MMOL/L (ref 136–145)
WBC NRBC COR # BLD AUTO: 11.39 10*3/MM3 (ref 3.4–10.8)

## 2025-04-08 PROCEDURE — 85027 COMPLETE CBC AUTOMATED: CPT | Performed by: INTERNAL MEDICINE

## 2025-04-08 PROCEDURE — 99238 HOSP IP/OBS DSCHRG MGMT 30/<: CPT

## 2025-04-08 PROCEDURE — G0378 HOSPITAL OBSERVATION PER HR: HCPCS

## 2025-04-08 PROCEDURE — 80048 BASIC METABOLIC PNL TOTAL CA: CPT | Performed by: INTERNAL MEDICINE

## 2025-04-08 PROCEDURE — 99204 OFFICE O/P NEW MOD 45 MIN: CPT

## 2025-04-08 RX ADMIN — HYDROCODONE BITARTRATE AND ACETAMINOPHEN 1 TABLET: 10; 325 TABLET ORAL at 09:09

## 2025-04-08 RX ADMIN — HYDROCODONE BITARTRATE AND ACETAMINOPHEN 1 TABLET: 10; 325 TABLET ORAL at 00:19

## 2025-04-08 RX ADMIN — LEVOTHYROXINE SODIUM 175 MCG: 0.07 TABLET ORAL at 09:06

## 2025-04-08 RX ADMIN — PANTOPRAZOLE SODIUM 40 MG: 40 TABLET, DELAYED RELEASE ORAL at 09:07

## 2025-04-08 RX ADMIN — METOPROLOL TARTRATE 25 MG: 25 TABLET, FILM COATED ORAL at 09:07

## 2025-04-08 RX ADMIN — LEFLUNOMIDE 20 MG: 20 TABLET ORAL at 09:07

## 2025-04-08 NOTE — ANESTHESIA POSTPROCEDURE EVALUATION
Patient: Whitney Schofield    Procedure Summary       Date: 04/07/25 Room / Location: [place] [address] / [facility] CARDIOVASCULAR OPERATING ROOM    Anesthesia Start: 1303 Anesthesia Stop: 1711    Procedure: PACEMAKER REPLACEMENT AND LEAD REPLACEMENT WITH LASER EXTRACTION (Chest) Diagnosis:       Pacemaker lead malfunction, initial encounter      (RV lead malfunction)    Surgeons: Eduar Lizama MD Provider: Madai Pickett MD    Anesthesia Type: generalBety ASA Status: 3            Anesthesia Type: general, Kirby    Vitals  Vitals Value Taken Time   /77 04/07/25 18:30   Temp 36.8 °C (98.2 °F) 04/07/25 18:30   Pulse 56 04/07/25 18:42   Resp 18 04/07/25 18:30   SpO2 97 % 04/07/25 18:42   Vitals shown include unfiled device data.        Post Anesthesia Care and Evaluation    Level of consciousness: awake  Pain management: adequate    Airway patency: patent  Anesthetic complications: No anesthetic complications    Cardiovascular status: acceptable  Respiratory status: acceptable  Hydration status: acceptable    Comments: /70 (BP Location: Left arm, Patient Position: Lying)   Pulse 87   Temp 36.7 °C (98 °F) (Oral)   Resp 18   SpO2 97%

## 2025-04-08 NOTE — PLAN OF CARE
Goal Outcome Evaluation:  Plan of Care Reviewed With: patient        Progress: improving  Outcome Evaluation: Denies incisional pain, complains of headache. Medicated with relief. Up [facility] stand by assist to bathroom. Incision c/d/i.

## 2025-04-08 NOTE — OUTREACH NOTE
Prep Survey      Flowsheet Row Responses   [facility] facility patient discharged from? [place]   Is LACE score < 7 ? No   Eligibility [facility]   Date of Admission 04/07/25   Date of Discharge 04/08/25   Discharge Disposition Home or Self Care   Discharge diagnosis Pacemaker malfunction-replacement this visit   Does the patient have one of the following disease processes/diagnoses(primary or secondary)? General Surgery   Does the patient have Home health ordered? No   Is there a DME ordered? No   Prep survey completed? Yes            SARITHA QUIROZ - Registered Nurse

## 2025-04-08 NOTE — CONSULTS
Neurology Consult Note    Consult Date: 4/8/2025    Referring MD: Eduar Lizama MD    Reason for Consult I have been asked to see the patient in neurological consultation to render advice and opinion regarding left hand numbness and tingling.    Whitney Schofield is a 59 y.o. female with a past medical history of DVT, PE, migraine, atrial fibrillation which she was cardioverted for and remained in sinus rhythm for greater than 6 months and is on Xarelto that is being held from 4/3 until this Friday, nonischemic cardiomyopathy, obstructive sleep apnea on CPAP, history of thyroid cancer, rheumatoid arthritis severe and debilitating was admitted to the hospital for pacemaker malfunction and replacement by cardiology yesterday.  Patient has permanent pacemaker because she has a history of previous ASD repair and tricuspid repair and then postoperatively she developed heart block and required pacemaker.    Patient states that a couple hours after the procedure she developed numbness in her left hand.  She states the numbness is primarily in her 4th and 5th finger and base of the ulnar side of her hand and wrist.  She states that this is progressively worsened from yesterday.  She states today she has had trouble holding cup secondary to the numbness.  She denies associated neck pain but does have some pain between her shoulder blades which she thought was just from the procedure.  She has severe rheumatoid arthritis and has arthritis in her back and has had similar pain prior and this is no worse.  No recent injury.  She denies saddle anesthesia, Lhermitte sign, urinary/bowel retention/incontinence.  She denies any numbness of her leg or face.  She denies any visual disturbance, speech disturbance, gait imbalance, dizziness.  She states she did have her typical headache/migraine yesterday (right-sided, throbbing, moderate in severity with no light or sound sensitivity or nausea and vomiting) prior to procedure but  has since resolved.  Patient is normotensive and afebrile.  Mild leukocytosis.  Glucose 160.    Past Medical/Surgical Hx:  Past Medical History:   Diagnosis Date    Anemia     HISTORY OF.    ASD (atrial septal defect)     REPAIRED    Asthma     Asystole     Post ASD closure asystole and high-grade AV block.  s/p pacemaker 5/11/10.    Celiac disease     DVT (deep venous thrombosis)     Left subclavian DVT following right atrial pacemaker lead revision in 2/14    Edema     AT TIMES.    Hashimoto's thyroiditis     s/p partial thyroidectomy 1/13/10    Heart palpitations     History of atrial fibrillation     REASON FOR XARELTO. XARELTO TO BE HELD 3 DAYS PRIOR    History of migraine     Hx of thyroid cancer     Hypertension     AT TIMES.    Malignant neoplasm of thyroid gland     Papillary carcinoma of the thyroid - s/p residual thyroidectomy in 2/11.    Nonischemic cardiomyopathy     EF as low as 35% 3/13.  EF 6/21/13 by CHANDANA was 50-55%, and 53% by echo on 6/17/14.  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  EF 50% by CHANDANA on 4/18/16.    NSVT (nonsustained ventricular tachycardia)     Noted by event recorder 9/12.  EP study by Dr. Aguilar on 11/20/12 showed no inducible VT.    LUKAS on CPAP     NO MACHINE USED FOR YEARS    Pacemaker lead failure     Papillary thyroid carcinoma 08/27/2013    HAS SINCE HAD BOTH LOBES OF THYROID REMOVED    Rheumatoid arthritis     Severe and debilitating     Secundum ASD     s/p open closure with a PeriPatch xenograft by Dr. Harper on 5/6/10 by Dr. Harper at [facility].    Sleep apnea     SOB (shortness of breath)     Multifactorial     Thrombus due to any device, implant or graft     Extensive thrombus formation on the pacemaker leads by CHANDANA 3/25/13.  Initiated on Coumadin transiently at that time - improved significantly by CHANDANA 6/21/13.     Past Surgical History:   Procedure Laterality Date    ASD AND VSD REPAIR      BREAST BIOPSY Right     >20 years ago    CARDIAC  ELECTROPHYSIOLOGY PROCEDURE N/A 01/18/2021    Procedure: Generator Change BI Ventriculat PPM- Medtronic;  Surgeon: Jose Mcdowell MD;  Location: [facility] CATH INVASIVE LOCATION;  Service: Cardiology;  Laterality: N/A;    CHOLECYSTECTOMY  2007    ENDOSCOPY AND COLONOSCOPY  12/30/2015    Tom Orta MD    EXCISION MASS TRUNK Right 07/20/2018    Procedure: EXCISION MASS TRUNK, RUQ ABD WALL LIPOMA;  Surgeon: Eduar Marx MD;  Location: [facility] OR OSC;  Service: General    PACEMAKER IMPLANTATION  05/2010    Inital dual-chamber pacemaker placed 5/11/10 after ASD closure surgery.  Had LV lead placement and generator change on 8/19/13 by Dr. Aguilar (Medtronic #[id number]).  Then had right atrial lead revision on 2/19/14 (for right atrial lead dislodgement and diaphragmatic stimulation) - developed left subclavian DVT afterwards.  Then had laser lead extraction of the abandoned right atrial lead on 4/17/14.    REPLACEMENT TOTAL KNEE BILATERAL      THYROID LOBECTOMY Right 2012    THYROIDECTOMY Left 2011    Dr. Coronado, radioactive iodine X3    TONSILLECTOMY      TOTAL SHOULDER REVERSE ARTHROPLASTY      LEFT AND RIGHT    TRICUSPID VALVE SURGERY      #32 MC3 annuloplasty ring by Dr. Harper on 5/6/10 (at time of ASD closure)       Medications On Admission  Medications Prior to Admission   Medication Sig Dispense Refill Last Dose/Taking    Ferrous Sulfate Dried 200 (65 FE) MG tablet Take 1 tablet by mouth Daily.   4/6/2025 Morning    folic acid (FOLVITE) 1 MG tablet Take 1 tablet by mouth Daily.   4/6/2025 Morning    HYDROcodone-acetaminophen (NORCO)  MG per tablet Take 1 tablet by mouth Every 6 (Six) Hours As Needed (PAIN).   4/6/2025 Bedtime    leflunomide (ARAVA) 20 MG tablet Take 1 tablet by mouth Daily.   4/6/2025 Morning    levothyroxine (SYNTHROID, LEVOTHROID) 175 MCG tablet Take 1 tablet by mouth Daily.   4/7/2025 Morning    metoprolol tartrate (LOPRESSOR) 25 MG tablet Take 1 tablet by mouth 2 (Two) Times a  Day. May take additional 25mg as needed for palpitations 270 tablet 3 4/7/2025 Morning    pantoprazole (PROTONIX) 40 MG EC tablet Take 1 tablet by mouth Daily.   4/7/2025 Morning    Actemra ACTPen 162 MG/0.9ML solution auto-injector injection 0.9 mL by Subcutaneous Infusion route Every 14 (Fourteen) Days. HAS NOT HAD IN 6-8 WEEKS.   2/7/2025    bumetanide (BUMEX) 0.5 MG tablet Take 2 tablets by mouth Daily As Needed (swelling). (Patient taking differently: Take 2 tablets by mouth Daily As Needed (swelling). STATES BEEN A LONG TIME SINCE SHE HAS TAKEN) 90 tablet 1 More than a month    methotrexate 2.5 MG tablet Take 8 tablets by mouth 1 (One) Time Per Week. WENDS   4/2/2025 Evening    rivaroxaban (Xarelto) 20 MG tablet TAKE 1 TABLET DAILY WITH DINNER (Patient taking differently: Take 1 tablet by mouth Daily With Dinner. INSTRUCTED TO STOP 3 DAYS PRIOR TO PROCEDURE) 90 tablet 1 4/3/2025 Evening    valsartan (DIOVAN) 80 MG tablet Take 1 tablet by mouth Every Night. (Patient taking differently: Take 1 tablet by mouth Every Night. HOLD 24 HOURS PRIOR TO PROCEDURE) 90 tablet 3 4/2/2025 Evening    vitamin D (ERGOCALCIFEROL) 45901 units capsule capsule Take 1 capsule by mouth 3 (Three) Times a Week. M,W,F   4/4/2025 Morning       Allergies:  Allergies   Allergen Reactions    Ferumoxytol Other (See Comments)     Pt was hospitalized-SEVERE BACK PAIN AND SWELLING REDNESS..ABLE TO TOLERATE ORAL IRON    Azithromycin Rash    Sugammadex Hives     Possible reaction with hives/redness in PACU after TSA on 3/16/21.  After chart review, it looks like this was the only mediation that the patient had not previously received.       Social Hx:  Social History     Socioeconomic History    Marital status:     Number of children: 2   Tobacco Use    Smoking status: Never     Passive exposure: Never    Smokeless tobacco: Never   Vaping Use    Vaping status: Never Used   Substance and Sexual Activity    Alcohol use: No    Drug use: No     "Sexual activity: Yes     Partners: Male       Family Hx:  Family History   Problem Relation Age of Onset    COPD Mother     Heart failure Mother         CHF    Lung disease Mother     Hypertension Mother     Diabetes Mother     Coronary artery disease Mother     Diabetes Sister     Breast cancer Maternal Aunt     Malig Hyperthermia Neg Hx          Exam    /70 (BP Location: Left arm, Patient Position: Lying)   Pulse 87   Temp 98 °F (36.7 °C) (Oral)   Resp 18   SpO2 97%   gen: NAD, vitals reviewed  MS: oriented x3, recent/remote memory intact, normal attention/concentration, language intact, no neglect, normal fund of knowledge  CN: visual acuity grossly normal, visual fields full, PERRL, EOMI, facial sensation equal, no facial droop, hearing symmetric, palate elevates symmetrically, shoulder shrug equal, tongue midline  Motor: 5/5 throughout upper and lower extremities, normal tone  Sensation: Decreased to light touch and cold temperature in the ulnar nerve distribution of left hand, positive Tinel sign after tapping ulnar nerve on left wrist.  Reflexes: 2+ bilateral upper extremities, no Faustin's  Coordination: no dysmetria with finger to nose bilaterally    DATA:    Lab Results   Component Value Date    GLUCOSE 160 (H) 04/08/2025    CALCIUM 8.1 (L) 04/08/2025     04/08/2025    K 4.2 04/08/2025    CO2 24.0 04/08/2025     04/08/2025    BUN 14 04/08/2025    CREATININE 0.89 04/08/2025    EGFRIFAFRI >60 03/01/2023    EGFRIFNONA 75 03/04/2021    BCR 15.7 04/08/2025    ANIONGAP 13.0 04/08/2025     Lab Results   Component Value Date    WBC 11.39 (H) 04/08/2025    HGB 11.4 (L) 04/08/2025    HCT 36.3 04/08/2025    MCV 84.4 04/08/2025     04/08/2025     Lab Results   Component Value Date     (H) 03/04/2021     No results found for: \"HGBA1C\"  Lab Results   Component Value Date    INR 1.1 07/20/2020    PROTIME 12.1 07/20/2020       Lab review:   Glucose 116  Calcium 8.1  WBC " 11.39  Hemoglobin 11.4    Imaging review:   Chest x-ray:  FINDINGS: Newly placed pacemaker in position, there are surgical skin  staples in place, no pneumothorax.  The cardiomediastinal silhouette is stable. Somewhat shallow inspiratory  effort, the right lung is clear, there is minimal patchy infiltrate or  atelectasis at the left base as before.  CONCLUSION: Pacemaker replacement, no pneumothorax.    Diagnoses:  Left hand numbness  Complete AV block status post pacemaker placement  History of tricuspid and ASD repair  Atrial fibrillation on Xarelto  History of DVT and PE  Rheumatoid arthritis, severe and debilitating    Comment: Patient is 59-year-old female with severe rheumatoid arthritis complains of left hand numbness in the ulnar distribution with positive Tinel which began a couple hours after pacemaker was replaced yesterday.  Xarelto has been held/4/3 and is being held till Friday secondary to this.  Patient numbness came on somewhat suddenly and is a little worse today.  She also complains of trouble holding a cup. No neck or back pain.  Patient differentials include small stroke vs ulnar neuropathy in setting of RA.     PLAN:   CTH without contrast within the next week outpatient to look for stroke.  Cannot get MRI for 6 weeks per cardiology with recent pacemaker placement.   EMG/NCS with neurology outpatient if does not improve  No further neuro work-up planned at this time, will sign off, please call with questions.     Recommendations discussed with Dr. Campos who is in agreement with plan.

## 2025-04-08 NOTE — DISCHARGE SUMMARY
DISCHARGE NOTE    Patient Name: Whitney Schofield  Age/Sex: 59 y.o. female  : 1965  MRN: [id number]    Date of Discharge:  2025   Date of Admit: 2025  Encounter Provider: RAHAT Pena  Place of Service: [facility] CARDIOLOGY  Patient Care Team:  Sana Adame APRN as PCP - General (Nurse Practitioner)  Lion Marques MD as Consulting Physician (Cardiology)  Yamil Ozuna MD as Consulting Physician (Rheumatology)  Dakota Bravo MD as Consulting Physician (Endocrinology)    Subjective:     Discharge Diagnosis:    Pacemaker lead malfunction    AV block, complete      Hospital Course:   Whitney Schofield is a 59-year-old female who has a history of complete heart block.    She has a history of CRT-D placement following her surgery for ASD and tricuspid valve repair.    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  She also had lead dislodgment and had extraction of the right atrial lead and reimplant.    This was all done by Dr. Mcdowell.  Over the last few she has been noted to have increase in RV threshold.  This required early battery depletion and she had to undergo GEN change about 3 to 4 years after initial implant.    She saw Dr. Lizama in 2025.  She was noted to have increase in RV threshold causing early battery depletion.  Options were discussed with her and ultimately elected to attempt extraction of RV lead and reimplant.      Yesterday 2025.  She underwent extraction of RV lead and then reimplantation of new RV lead.  Postoperatively her pacing thresholds are significantly improved and will improve longevity of her battery.    On CHANDANA during the case.  She was noted to have some significant tricuspid valve  regurgitation is likely exacerbated by removal.  She has no current symptoms and we will monitor this moving forward.    She developed some left hand numbness and tingling. Neurology evaluated and we are going to do a CT of the head as outpatient.     She can be discharged this afternoon.  Will have her follow-up in 1 week for staple removal and device check.      Vital Signs  Temp:  [98.1 °F (36.7 °C)] 98.1 °F (36.7 °C)  Heart Rate:  [81] 81  Resp:  [18] 18  BP: (95)/(69) 95/69    Intake/Output Summary (Last 24 hours) at 4/9/2025 0903  Last data filed at 4/8/2025 1154  Gross per 24 hour   Intake 480 ml   Output --   Net 480 ml       Physical Exam:    General Appearance: No acute distress, well developed and well nourished.     Labs:   Results from last 7 days   Lab Units 04/08/25  0237   SODIUM mmol/L 142   POTASSIUM mmol/L 4.2   CHLORIDE mmol/L 105   CO2 mmol/L 24.0   BUN mg/dL 14   CREATININE mg/dL 0.89   GLUCOSE mg/dL 160*   CALCIUM mg/dL 8.1*         Results from last 7 days   Lab Units 04/08/25  0237 04/07/25  1340   WBC 10*3/mm3 11.39*  --    HEMOGLOBIN g/dL 11.4*  --    HEMOGLOBIN, POC g/dL  --  10.9*   HEMATOCRIT % 36.3  --    HEMATOCRIT POC %  --  32*   PLATELETS 10*3/mm3 216  --                            Discharge Diet:            Activity at Discharge:   Activity Instructions    Advance as tolerated            Discharge Medications     Discharge Medications        Changes to Medications        Instructions Start Date   rivaroxaban 20 MG tablet  Commonly known as: Xarelto  What changed: additional instructions   20 mg, Oral, Daily With Dinner, RESUME on 4/11             Continue These Medications        Instructions Start Date   Actemra ACTPen 162 MG/0.9ML solution auto-injector injection  Generic drug: Tocilizumab   162 mg, Every 14 Days      bumetanide 0.5 MG tablet  Commonly known as: BUMEX   1 mg, Oral, Daily PRN      Ferrous Sulfate Dried 200 (65 Fe) MG tablet tablet   200 mg, Daily      folic acid  1 MG tablet  Commonly known as: FOLVITE   1 mg, Daily      HYDROcodone-acetaminophen  MG per tablet  Commonly known as: NORCO   1 tablet, Every 6 Hours PRN      leflunomide 20 MG tablet  Commonly known as: ARAVA   20 mg, Daily      levothyroxine 175 MCG tablet  Commonly known as: SYNTHROID, LEVOTHROID   175 mcg, Daily      methotrexate 2.5 MG tablet   20 mg, Weekly      metoprolol tartrate 25 MG tablet  Commonly known as: LOPRESSOR   25 mg, Oral, 2 Times Daily, May take additional 25mg as needed for palpitations      pantoprazole 40 MG EC tablet  Commonly known as: PROTONIX   40 mg, Daily      valsartan 80 MG tablet  Commonly known as: DIOVAN   80 mg, Oral, Nightly      vitamin D 1.25 MG (84163 UT) capsule capsule  Commonly known as: ERGOCALCIFEROL   50,000 Units, 3 Times Weekly               Discharge disposition: home    Follow-up Appointments   Follow-up Information       Sana Adame APRN .    Specialty: Nurse Practitioner  [facility]  Contact information:  [address] [place] [address]  [phone]                           Future Appointments   Date Time Provider Department Center   4/15/2025 11:00 AM Sana Adame APRN [facility]   5/12/2025  2:30 PM Kathy Cassidy APRN [id number]   7/8/2025 10:00 AM Sana Adame APRN [facility] Cody Ricker, APRN  04/09/25  09:03 EDT

## 2025-04-08 NOTE — PROGRESS NOTES
She is having some numbness and tingling in her left hand. No other complaints. Will ask neurology to weigh in. Hopefully discharge later today.

## 2025-04-09 ENCOUNTER — TRANSITIONAL CARE MANAGEMENT TELEPHONE ENCOUNTER (OUTPATIENT)
Dept: CALL CENTER | Facility: HOSPITAL | Age: 60
End: 2025-04-09
Payer: OTHER GOVERNMENT

## 2025-04-09 ENCOUNTER — TELEPHONE (OUTPATIENT)
Dept: CARDIOLOGY | Age: 60
End: 2025-04-09
Payer: OTHER GOVERNMENT

## 2025-04-09 DIAGNOSIS — R20.2 LEFT HAND PARESTHESIA: Primary | ICD-10-CM

## 2025-04-09 NOTE — TELEPHONE ENCOUNTER
called to [facility] STAT CT HEAD per Toni Alvarez// no answer// lvm fpr a call back

## 2025-04-09 NOTE — TELEPHONE ENCOUNTER
LVM and callback # to schedule incision and device check, needs to 7-10 days from 4/8 as pt has staples.   If she calls back she also needs 1 month device and EP appt as well, thanks!

## 2025-04-09 NOTE — OUTREACH NOTE
Call Center TCM Note      Flowsheet Row Responses   [facility] patient discharged from? [place]   Does the patient have one of the following disease processes/diagnoses(primary or secondary)? General Surgery   TCM attempt successful? Yes   Call start time 1341   Call end time 1345   Discharge diagnosis Pacemaker malfunction-replacement this visit   Person spoke with today (if not patient) and relationship Patient   Meds reviewed with patient/caregiver? Yes   Is the patient having any side effects they believe may be caused by any medication additions or changes? No   Does the patient have all medications related to this admission filled (includes all antibiotics, pain medications, etc.) N/A   Prescription comments No new medications at time of discharge. No questions or concerns regarding other medications.   Is the patient taking all medications as directed (includes completed medication regime)? Yes   Comments PCP HOSPITAL f/u appt scheduled on 4/15/25 at 11:00 AM with RAHAT Starks.   Does the patient have an appointment with their PCP within 7-14 days of discharge? Yes   Has home health visited the patient within 72 hours of discharge? N/A   Psychosocial issues? No   Did the patient receive a copy of their discharge instructions? Yes   Nursing interventions Reviewed instructions with patient   What is the patient's perception of their health status since discharge? Improving   Nursing interventions Nurse provided patient education   Is the patient /caregiver able to teach back basic post-op care? Take showers only when approved by MD-sponge bathe until then, No tub bath, swimming, or hot tub until instructed by MD, Keep incision areas clean,dry and protected, Do not remove steri-strips   Is the patient/caregiver able to teach back signs and symptoms of incisional infection? Increased redness, swelling or pain at the incisonal site, Increased drainage or bleeding, Incisional warmth, Pus or odor  from incision, Fever   Is the patient/caregiver able to teach back steps to recovery at home? Set small, achievable goals for return to baseline health, Rest and rebuild strength, gradually increase activity   If the patient is a current smoker, are they able to teach back resources for cessation? Not a smoker   Is the patient/caregiver able to teach back the hierarchy of who to call/visit for symptoms/problems? PCP, Specialist, Home health nurse, Urgent Care, ED, 911 Yes   TCM call completed? Yes   Wrap up additional comments PCP HOSPITAL f/u appt scheduled on 4/15/25 at 11:00 AM with RAHAT Starks. Patient has called Cardiology, Dr. Eduar Lizama's office to request a HOSPITAL f/u appt. waiting on call back.   Call end time 1345   Would this patient benefit from a Referral to Amb Social Work? No   Is the patient interested in additional calls from an ambulatory ? No            Eliane SHERMAN - Registered Nurse    4/9/2025, 13:46 EDT            Eliane SHERMAN - Samir Nurse

## 2025-04-09 NOTE — CASE MANAGEMENT/SOCIAL WORK
Case Management Discharge Note      Final Note: Home         Selected Continued Care - Discharged on 4/8/2025 Admission date: 4/7/2025 - Discharge disposition: Home or Self Care      Destination    No services have been selected for the patient.                Durable Medical Equipment    No services have been selected for the patient.                Dialysis/Infusion    No services have been selected for the patient.                Home Medical Care    No services have been selected for the patient.                Therapy    No services have been selected for the patient.                Community Resources    No services have been selected for the patient.                Community & DME    No services have been selected for the patient.                         Final Discharge Disposition Code: 01 - home or self-care

## 2025-04-10 ENCOUNTER — TELEPHONE (OUTPATIENT)
Dept: CARDIOLOGY | Age: 60
End: 2025-04-10

## 2025-04-10 NOTE — TELEPHONE ENCOUNTER
Caller: Whitney Schofield    Relationship to patient: Self    Best call back number: [phone]    Chief complaint: NA    Type of visit: STAPLE REMOVAL    Requested date: ONE WEEK FROM DISCHARGE     If rescheduling, when is the original appointment: NA     Additional notes:PLEASE CALL AND ADVISE NO TIME FRAME IN DISCHARGE PAPERWORK PATIENT WAS TOLD ONE WEEK.

## 2025-04-11 ENCOUNTER — HOSPITAL ENCOUNTER (OUTPATIENT)
Dept: CT IMAGING | Facility: HOSPITAL | Age: 60
Discharge: HOME | End: 2025-04-11
Payer: OTHER GOVERNMENT

## 2025-04-11 DIAGNOSIS — R20.2 LEFT HAND PARESTHESIA: ICD-10-CM

## 2025-04-11 PROCEDURE — 70450 CT HEAD/BRAIN W/O DYE: CPT

## 2025-04-11 NOTE — NURSING NOTE
"Patient arrived to xray triage for stat hold and call CT head without. Dr. Lee read as \"negative acute , can further evaluate with MRI as needed\" This was called to [facility] Triage and spoke with ANJALI Hood. Awaiting a return call.  "

## 2025-04-11 NOTE — NURSING NOTE
ANJALI Hood from [facility] called and stated she spoke to MICHELLE Sarah and patient may go home. Spouse pushed patient out to car per hospital wheelchair.

## 2025-04-11 NOTE — OP NOTE
Diagnosis  RV lead malfunction    Procedures performed  Extraction of RV lead  Placement of new RV pacing lead  Placement of new CRT-P generator    Blood loss  Minimal    Complications  Tricuspid damage    Description of procedure    Patient was prepped and draped in the usual fashion for femoral venous access.  Access was obtained in the right femoral vein at 2 sites.  I obtained access in the right femoral artery.  A 4 Czech sheath was placed in the artery and 8 Czech and a 6 Czech sheath were placed in the vein.  I placed a pacing catheter into the right ventricle for temporary pacing during lead extraction.  A needles snare was used to grasp the RV pacing lead.  A wire was placed in the SVC for use of the occlusion balloon if needed.    The pocket was opened.  The old CRT-P generator was removed.  The pocket was normal in appearance.  The RV lead was prepped for extraction by placement of a locking stylet.  Stiff stylets were placed into the RA and RV lead for stabilization during extraction.    We used a 14 Czech laser to perform extraction of the RV lead.  We progressed down to the right atrium.  At this point there was a very acute angle between the laser and the lead tip.  I attempted to use the snare to stabilize this and improve the angle however the lead came free.  CHANDANA showed that there have been damage to the septal leaflet of the tricuspid valve.    A wire was placed.  Sheath was placed.  We made attempts at placing a conduction pacing using the 304 deflectable sheath, but they were not successful, and ultimately just decided to place a conventional pacing lead.  A good spot was found there was good injury, sensing, and thresholds.    The lead was secured.  A new CRT-P generator was placed connecting to the RV pacing lead, the LV lead, and the right atrial lead.    The pocket was closed in layers with 2-0 Vicryl, 3-0 Vicryl, and staples.    Catheters and sheaths were removed from the groin.  Figure  8 sutures were placed for closure.

## 2025-04-14 ENCOUNTER — TELEPHONE (OUTPATIENT)
Dept: CARDIOLOGY | Age: 60
End: 2025-04-14

## 2025-04-14 ENCOUNTER — CLINICAL SUPPORT NO REQUIREMENTS (OUTPATIENT)
Age: 60
End: 2025-04-14
Payer: OTHER GOVERNMENT

## 2025-04-14 DIAGNOSIS — I42.8 NONISCHEMIC CARDIOMYOPATHY: Primary | ICD-10-CM

## 2025-04-14 NOTE — TELEPHONE ENCOUNTER
Caller: Whitney Schofield    Relationship: Self    Best call back number: [phone]      PATIENT HAS QUESTIONS ABOUT SURGERY, ALSO WILL NEED A RETURN TO WORK.    REQUESTING APPT

## 2025-04-15 ENCOUNTER — OFFICE VISIT (OUTPATIENT)
Dept: FAMILY MEDICINE CLINIC | Facility: CLINIC | Age: 60
End: 2025-04-15
Payer: OTHER GOVERNMENT

## 2025-04-15 VITALS
SYSTOLIC BLOOD PRESSURE: 124 MMHG | DIASTOLIC BLOOD PRESSURE: 84 MMHG | HEIGHT: 63 IN | OXYGEN SATURATION: 96 % | HEART RATE: 81 BPM | TEMPERATURE: 98.6 F | BODY MASS INDEX: 36.25 KG/M2 | WEIGHT: 204.6 LBS

## 2025-04-15 DIAGNOSIS — Z09 HOSPITAL DISCHARGE FOLLOW-UP: Primary | ICD-10-CM

## 2025-04-15 DIAGNOSIS — T82.110D PACEMAKER LEAD MALFUNCTION, SUBSEQUENT ENCOUNTER: ICD-10-CM

## 2025-04-15 DIAGNOSIS — E83.51 LOW CALCIUM LEVELS: ICD-10-CM

## 2025-04-15 DIAGNOSIS — G62.9 NEUROPATHY: ICD-10-CM

## 2025-04-15 DIAGNOSIS — D64.9 ANEMIA, UNSPECIFIED TYPE: ICD-10-CM

## 2025-04-15 NOTE — PROGRESS NOTES
Transitional Care Follow Up Visit  Subjective     Whitney Schofield is a 59 y.o. female who presents for a transitional care management visit.    Within 48 business hours after discharge our office contacted her via telephone to coordinate her care and needs.      I reviewed and discussed the details of that call along with the discharge summary, hospital problems, inpatient lab results, inpatient diagnostic studies, and consultation reports with Whitney.     Current outpatient and discharge medications have been reconciled for the patient.  Reviewed by: RAHAT Starks          4/8/2025     5:59 PM   Date of TCM Phone Call   [facility]   Date of Admission 4/7/2025   Date of Discharge 4/8/2025   Discharge Disposition Home or Self Care     Risk for Readmission (LACE) No data recorded      History of Present Illness  Patient presents to the office today for a hospital follow up.   Patient was seen for pacemaker malfunction. She is following cardiology, current off work and cardiology will release her to return to work. She is stable today, denies chest pain, shortness of air.   She is experiencing numbness/tingling to her ring finger, pinky finger left hand.    CT head negative for acute findings related to neuropathy.      Course During Hospital Stay:  4/7/2025-4/8/2025     The following portions of the patient's history were reviewed and updated as appropriate: allergies, current medications, past family history, past medical history, past social history, past surgical history, and problem list.    Review of Systems   Constitutional:  Negative for activity change.   HENT:  Negative for congestion.    Respiratory:  Negative for apnea, cough, choking, chest tightness, shortness of breath, wheezing and stridor.    Cardiovascular:  Negative for chest pain, palpitations and leg swelling.   Endocrine: Negative for polydipsia and polyphagia.   Genitourinary:  Negative for dysuria.   Musculoskeletal:   "Negative for arthralgias.   Skin:  Negative for rash.   Neurological:  Positive for numbness. Negative for weakness.        Neuropathy to left hand fingers       Objective   /84 (BP Location: Left arm, Patient Position: Sitting, Cuff Size: Adult)   Pulse 81   Temp 98.6 °F (37 °C)   Ht 160 cm (62.99\")   Wt 92.8 kg (204 lb 9.6 oz)   SpO2 96%   BMI 36.25 kg/m²   Physical Exam  Constitutional:       General: She is not in acute distress.     Appearance: Normal appearance.   HENT:      Head: Normocephalic.   Eyes:      Pupils: Pupils are equal, round, and reactive to light.   Cardiovascular:      Rate and Rhythm: Normal rate.      Pulses: Normal pulses.      Heart sounds: Normal heart sounds.   Pulmonary:      Effort: Pulmonary effort is normal.      Breath sounds: Normal breath sounds.   Musculoskeletal:         General: Normal range of motion.      Cervical back: Normal range of motion and neck supple.   Skin:     General: Skin is warm.   Neurological:      General: No focal deficit present.      Mental Status: She is alert and oriented to person, place, and time.   Psychiatric:         Mood and Affect: Mood normal.         Behavior: Behavior normal.         Thought Content: Thought content normal.         Judgment: Judgment normal.         Assessment & Plan   Problems Addressed this Visit          Cardiac and Vasculature    Pacemaker lead malfunction       Genitourinary and Reproductive     Low calcium levels    Relevant Orders    Basic metabolic panel (Completed)       Health Encounters    Hospital discharge follow-up - Primary       Hematology and Neoplasia    Anemia    Relevant Orders    CBC w AUTO Differential (Completed)       Neuro    Neuropathy    Relevant Orders    EMG & Nerve Conduction Test     Diagnoses         Codes Comments      Hospital discharge follow-up    -  Primary ICD-10-CM: Z09  ICD-9-CM: V67.59       Pacemaker lead malfunction, subsequent encounter     ICD-10-CM: T82.110D  ICD-9-CM: " V58.89, 996.01       Neuropathy     ICD-10-CM: G62.9  ICD-9-CM: 355.9       Low calcium levels     ICD-10-CM: E83.51  ICD-9-CM: 275.41       Anemia, unspecified type     ICD-10-CM: D64.9  ICD-9-CM: 285.9

## 2025-04-16 LAB
BASOPHILS # BLD AUTO: 0 X10E3/UL (ref 0–0.2)
BASOPHILS NFR BLD AUTO: 1 %
BUN SERPL-MCNC: 13 MG/DL (ref 8–27)
BUN/CREAT SERPL: 15 (ref 12–28)
CALCIUM SERPL-MCNC: 8.4 MG/DL (ref 8.7–10.3)
CHLORIDE SERPL-SCNC: 104 MMOL/L (ref 96–106)
CO2 SERPL-SCNC: 23 MMOL/L (ref 20–29)
CREAT SERPL-MCNC: 0.89 MG/DL (ref 0.57–1)
EGFRCR SERPLBLD CKD-EPI 2021: 74 ML/MIN/1.73
EOSINOPHIL # BLD AUTO: 0.3 X10E3/UL (ref 0–0.4)
EOSINOPHIL NFR BLD AUTO: 4 %
ERYTHROCYTE [DISTWIDTH] IN BLOOD BY AUTOMATED COUNT: 15.9 % (ref 11.7–15.4)
GLUCOSE SERPL-MCNC: 95 MG/DL (ref 70–99)
HCT VFR BLD AUTO: 32.9 % (ref 34–46.6)
HGB BLD-MCNC: 10.5 G/DL (ref 11.1–15.9)
IMM GRANULOCYTES # BLD AUTO: 0 X10E3/UL (ref 0–0.1)
IMM GRANULOCYTES NFR BLD AUTO: 1 %
LYMPHOCYTES # BLD AUTO: 1.1 X10E3/UL (ref 0.7–3.1)
LYMPHOCYTES NFR BLD AUTO: 15 %
MCH RBC QN AUTO: 27.3 PG (ref 26.6–33)
MCHC RBC AUTO-ENTMCNC: 31.9 G/DL (ref 31.5–35.7)
MCV RBC AUTO: 86 FL (ref 79–97)
MONOCYTES # BLD AUTO: 0.4 X10E3/UL (ref 0.1–0.9)
MONOCYTES NFR BLD AUTO: 6 %
NEUTROPHILS # BLD AUTO: 5.7 X10E3/UL (ref 1.4–7)
NEUTROPHILS NFR BLD AUTO: 73 %
PLATELET # BLD AUTO: 265 X10E3/UL (ref 150–450)
POTASSIUM SERPL-SCNC: 4.2 MMOL/L (ref 3.5–5.2)
RBC # BLD AUTO: 3.85 X10E6/UL (ref 3.77–5.28)
SODIUM SERPL-SCNC: 141 MMOL/L (ref 134–144)
WBC # BLD AUTO: 7.5 X10E3/UL (ref 3.4–10.8)

## 2025-04-18 ENCOUNTER — TELEPHONE (OUTPATIENT)
Dept: FAMILY MEDICINE CLINIC | Facility: CLINIC | Age: 60
End: 2025-04-18
Payer: OTHER GOVERNMENT

## 2025-04-18 ENCOUNTER — READMISSION MANAGEMENT (OUTPATIENT)
Dept: CALL CENTER | Facility: HOSPITAL | Age: 60
End: 2025-04-18
Payer: OTHER GOVERNMENT

## 2025-04-18 NOTE — TELEPHONE ENCOUNTER
Caller: Whitney Schofield    Relationship: Self    Best call back number: [phone]     What orders are you requesting (i.e. lab or imaging): LABS - RECEIVED MESSAGE TO SCHEDULE LAB APPOINTMENT AT THE END OF MONTH    In what timeframe would the patient need to come in: END OF MONTH    Where will you receive your lab/imaging services: IN OFFICE    Additional notes: PLEASE ENTER LABS AND CALL PATIENT TO MAKE AN APPOINTMENT FOR LABS.

## 2025-04-18 NOTE — OUTREACH NOTE
General Surgery Week 2 Survey      Flowsheet Row Responses   [facility] patient discharged from? [place]   Does the patient have one of the following disease processes/diagnoses(primary or secondary)? General Surgery   Week 2 attempt successful? No   Unsuccessful attempts Attempt 1            Stone BAKER - Registered Nurse

## 2025-04-22 DIAGNOSIS — I10 PRIMARY HYPERTENSION: Primary | ICD-10-CM

## 2025-04-22 DIAGNOSIS — D64.9 ANEMIA, UNSPECIFIED TYPE: ICD-10-CM

## 2025-04-22 DIAGNOSIS — R60.0 EDEMA LEG: Primary | ICD-10-CM

## 2025-04-22 DIAGNOSIS — E83.51 LOW CALCIUM LEVELS: ICD-10-CM

## 2025-04-22 RX ORDER — BUMETANIDE 1 MG/1
1 TABLET ORAL DAILY PRN
Qty: 30 TABLET | Refills: 1 | Status: SHIPPED | OUTPATIENT
Start: 2025-04-22

## 2025-04-23 ENCOUNTER — READMISSION MANAGEMENT (OUTPATIENT)
Dept: CALL CENTER | Facility: HOSPITAL | Age: 60
End: 2025-04-23
Payer: OTHER GOVERNMENT

## 2025-04-23 DIAGNOSIS — D64.9 ANEMIA, UNSPECIFIED TYPE: Primary | ICD-10-CM

## 2025-04-23 NOTE — OUTREACH NOTE
General Surgery Week 2 Survey      Flowsheet Row Responses   [facility] patient discharged from? [place]   Does the patient have one of the following disease processes/diagnoses(primary or secondary)? General Surgery   Week 2 attempt successful? Yes   Call start time 1443   Call end time 1445   Discharge diagnosis Pacemaker malfunction-replacement this visit   Is the patient taking all medications as directed (includes completed medication regime)? Yes   Does the patient have a follow up appointment scheduled with their surgeon? Yes   Has the patient kept scheduled appointments due by today? Yes   Comments seen PCP on 4/15   Has home health visited the patient within 72 hours of discharge? N/A   Psychosocial issues? No   What is the patient's perception of their health status since discharge? Improving   Nursing interventions Nurse provided patient education   Is the patient/caregiver able to teach back the hierarchy of who to call/visit for symptoms/problems? PCP, Specialist, Home health nurse, Urgent Care, ED, 911 Yes   Week 2 call completed? Yes   Graduated Yes   Is the patient interested in additional calls from an ambulatory ? No   Would this patient benefit from a Referral to Amb Social Work? No   Wrap up additional comments Doing well, has been to see her MDs for follow ups, denies any questions or concerns, no further calls needed.   Call end time 1445            Zarina JOHNSTON - Registered Nurse

## 2025-04-27 PROBLEM — D64.9 ANEMIA: Status: ACTIVE | Noted: 2025-04-27

## 2025-04-30 DIAGNOSIS — D64.9 ANEMIA, UNSPECIFIED TYPE: ICD-10-CM

## 2025-04-30 DIAGNOSIS — E83.51 LOW CALCIUM LEVELS: Primary | ICD-10-CM

## 2025-06-13 ENCOUNTER — OFFICE VISIT (OUTPATIENT)
Age: 60
End: 2025-06-13
Payer: OTHER GOVERNMENT

## 2025-06-13 ENCOUNTER — CLINICAL SUPPORT NO REQUIREMENTS (OUTPATIENT)
Age: 60
End: 2025-06-13
Payer: OTHER GOVERNMENT

## 2025-06-13 VITALS
BODY MASS INDEX: 36.5 KG/M2 | HEIGHT: 63 IN | HEART RATE: 97 BPM | SYSTOLIC BLOOD PRESSURE: 144 MMHG | DIASTOLIC BLOOD PRESSURE: 84 MMHG | WEIGHT: 206 LBS

## 2025-06-13 DIAGNOSIS — I36.1 NONRHEUMATIC TRICUSPID VALVE REGURGITATION: ICD-10-CM

## 2025-06-13 DIAGNOSIS — I42.8 NONISCHEMIC CARDIOMYOPATHY: Primary | ICD-10-CM

## 2025-06-13 DIAGNOSIS — I44.2 AV BLOCK, COMPLETE: ICD-10-CM

## 2025-06-13 PROBLEM — I07.1 TRICUSPID REGURGITATION: Status: ACTIVE | Noted: 2025-06-13

## 2025-06-13 NOTE — PROGRESS NOTES
Date of Office Visit: 2025  Encounter Provider: RAHAT Pena  Place of Service: [facility] CARDIOLOGY  Patient Name: Whitney Schofield  :1965    Chief Complaint   Patient presents with    NICM    persistent AFIB    Pacemaker Check   :     HPI: Whitney Schofield is a 59 y.o. female who has a history of complete heart block.     She has a history of CRT-D placement following her surgery for ASD and tricuspid valve repair.     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  She also had lead dislodgment and had extraction of the right atrial lead and reimplant.     This was all done by Dr. Mcdowell.  Over the last few she has been noted to have increase in RV threshold.  This required early battery depletion and she had to undergo GEN change about 3 to 4 years after initial implant.     She saw Dr. Lizama in 2025.  She was noted to have increase in RV threshold causing early battery depletion.  Options were discussed with her and ultimately elected to attempt extraction of RV lead and reimplant.     2025.  She underwent extraction of RV lead and then reimplantation of new RV lead.  Postoperatively her pacing thresholds are significantly improved and will improve longevity of her battery.              She presents today for follow-up appointment.    Since device extraction.  She has been doing okay.    She has had some more swelling since then that she says she has not really dealt with in a while.    She also has some dyspnea on exertion.  Not every day but more than she used to.    Normal device testing infection office today.  CRT pacing at 100%.    Her RV threshold significantly improved 0.75 at 0.4 today.  Estimated battery life about 9 years.    She does have history of tricuspid valve repair and she was noted to have significant regurgitation  post extraction.    She is taking Bumex not daily but more often than she used to.        Past Medical History:   Diagnosis Date    Anemia     HISTORY OF.    ASD (atrial septal defect)     REPAIRED    Asthma     Asystole     Post ASD closure asystole and high-grade AV block.  s/p pacemaker 5/11/10.    Celiac disease     DVT (deep venous thrombosis)     Left subclavian DVT following right atrial pacemaker lead revision in 2/14    Edema     AT TIMES.    Hashimoto's thyroiditis     s/p partial thyroidectomy 1/13/10    Heart palpitations     History of atrial fibrillation     REASON FOR XARELTO. XARELTO TO BE HELD 3 DAYS PRIOR    History of migraine     Hx of thyroid cancer     Hypertension     AT TIMES.    Malignant neoplasm of thyroid gland     Papillary carcinoma of the thyroid - s/p residual thyroidectomy in 2/11.    Nonischemic cardiomyopathy     EF as low as 35% 3/13.  EF 6/21/13 by CHANDANA was 50-55%, and 53% by echo on 6/17/14.  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  EF 50% by CHANDANA on 4/18/16.    NSVT (nonsustained ventricular tachycardia)     Noted by event recorder 9/12.  EP study by Dr. Aguilar on 11/20/12 showed no inducible VT.    LUKAS on CPAP     NO MACHINE USED FOR YEARS    Pacemaker lead failure     Papillary thyroid carcinoma 08/27/2013    HAS SINCE HAD BOTH LOBES OF THYROID REMOVED    Rheumatoid arthritis     Severe and debilitating     Secundum ASD     s/p open closure with a PeriPatch xenograft by Dr. Harper on 5/6/10 by Dr. Harper at [facility].    Sleep apnea     SOB (shortness of breath)     Multifactorial     Thrombus due to any device, implant or graft     Extensive thrombus formation on the pacemaker leads by CHANDANA 3/25/13.  Initiated on Coumadin transiently at that time - improved significantly by CHANDANA 6/21/13.       Past Surgical History:   Procedure Laterality Date    ASD AND VSD REPAIR      BREAST BIOPSY Right     >20 years ago    CARDIAC ELECTROPHYSIOLOGY PROCEDURE N/A 01/18/2021     Procedure: Generator Change BI Ventriculat PPM- Medtronic;  Surgeon: Jose Mcdowell MD;  Location: [facility] CATH INVASIVE LOCATION;  Service: Cardiology;  Laterality: N/A;    CHOLECYSTECTOMY  2007    ENDOSCOPY AND COLONOSCOPY  12/30/2015    Tom Orta MD    EXCISION MASS TRUNK Right 07/20/2018    Procedure: EXCISION MASS TRUNK, RUQ ABD WALL LIPOMA;  Surgeon: Eduar Marx MD;  Location:  [facility];  Service: General    IMPLANTABLE CARDIOVERTER DEFIBRILLATOR LEAD REPLACEMENT/POCKET REVISION N/A 4/7/2025    Procedure: PACEMAKER REPLACEMENT AND LEAD REPLACEMENT WITH LASER EXTRACTION;  Surgeon: Eduar Lizama MD;  Location: [facility] CVOR;  Service: Cardiology;  Laterality: N/A;    PACEMAKER IMPLANTATION  05/2010    Inital dual-chamber pacemaker placed 5/11/10 after ASD closure surgery.  Had LV lead placement and generator change on 8/19/13 by Dr. Aguilar (Medtronic #[id number]).  Then had right atrial lead revision on 2/19/14 (for right atrial lead dislodgement and diaphragmatic stimulation) - developed left subclavian DVT afterwards.  Then had laser lead extraction of the abandoned right atrial lead on 4/17/14.    REPLACEMENT TOTAL KNEE BILATERAL      THYROID LOBECTOMY Right 2012    THYROIDECTOMY Left 2011    Dr. Coronado, radioactive iodine X3    TONSILLECTOMY      TOTAL SHOULDER REVERSE ARTHROPLASTY      LEFT AND RIGHT    TRICUSPID VALVE SURGERY      #32 MC3 annuloplasty ring by Dr. Harper on 5/6/10 (at time of ASD closure)       Social History     Socioeconomic History    Marital status:     Number of children: 2   Tobacco Use    Smoking status: Never     Passive exposure: Never    Smokeless tobacco: Never   Vaping Use    Vaping status: Never Used   Substance and Sexual Activity    Alcohol use: No    Drug use: No    Sexual activity: Yes     Partners: Male       Family History   Problem Relation Age of Onset    COPD Mother     Heart failure Mother         CHF    Lung disease Mother      Hypertension Mother     Diabetes Mother     Coronary artery disease Mother     Diabetes Sister     Breast cancer Maternal Aunt     Malig Hyperthermia Neg Hx        Review of Systems   Constitutional: Negative for chills, fever and malaise/fatigue.   Cardiovascular:  Negative for chest pain, dyspnea on exertion, leg swelling, near-syncope, orthopnea, palpitations, paroxysmal nocturnal dyspnea and syncope.   Respiratory:  Negative for cough and shortness of breath.    Hematologic/Lymphatic: Negative.    Musculoskeletal:  Negative for joint pain, joint swelling and myalgias.   Gastrointestinal:  Negative for abdominal pain, diarrhea, melena, nausea and vomiting.   Genitourinary:  Negative for frequency and hematuria.   Neurological:  Negative for light-headedness, numbness, paresthesias and seizures.   Allergic/Immunologic: Negative.    All other systems reviewed and are negative.      Allergies   Allergen Reactions    Ferumoxytol Other (See Comments)     Pt was hospitalized-SEVERE BACK PAIN AND SWELLING REDNESS..ABLE TO TOLERATE ORAL IRON    Azithromycin Rash    Sugammadex Hives     Possible reaction with hives/redness in PACU after TSA on 3/16/21.  After chart review, it looks like this was the only mediation that the patient had not previously received.         Current Outpatient Medications:     bumetanide (BUMEX) 1 MG tablet, Take 1 tablet by mouth Daily As Needed (swelling). STATES BEEN A LONG TIME SINCE SHE HAS TAKEN, Disp: 30 tablet, Rfl: 1    Ferrous Sulfate Dried 200 (65 FE) MG tablet, Take 1 tablet by mouth Daily., Disp: , Rfl:     folic acid (FOLVITE) 1 MG tablet, Take 1 tablet by mouth Daily., Disp: , Rfl:     HYDROcodone-acetaminophen (NORCO)  MG per tablet, Take 1 tablet by mouth Every 6 (Six) Hours As Needed (PAIN)., Disp: , Rfl:     leflunomide (ARAVA) 20 MG tablet, Take 1 tablet by mouth Daily., Disp: , Rfl:     levothyroxine (SYNTHROID, LEVOTHROID) 175 MCG tablet, Take 1 tablet by mouth Daily.,  "Disp: , Rfl:     methotrexate 2.5 MG tablet, Take 8 tablets by mouth 1 (One) Time Per Week. WENDS, Disp: , Rfl:     metoprolol tartrate (LOPRESSOR) 25 MG tablet, Take 1 tablet by mouth 2 (Two) Times a Day. May take additional 25mg as needed for palpitations, Disp: 270 tablet, Rfl: 3    pantoprazole (PROTONIX) 40 MG EC tablet, Take 1 tablet by mouth Daily., Disp: , Rfl:     rivaroxaban (Xarelto) 20 MG tablet, Take 1 tablet by mouth Daily With Dinner. RESUME on 4/11, Disp: , Rfl:     valsartan (DIOVAN) 80 MG tablet, Take 1 tablet by mouth Every Night., Disp: 90 tablet, Rfl: 3    vitamin D (ERGOCALCIFEROL) 01213 units capsule capsule, Take 1 capsule by mouth 3 (Three) Times a Week. M,W,F, Disp: , Rfl:     Actemra ACTPen 162 MG/0.9ML solution auto-injector injection, 0.9 mL by Subcutaneous Infusion route Every 14 (Fourteen) Days. HAS NOT HAD IN 6-8 WEEKS. (Patient not taking: Reported on 6/13/2025), Disp: , Rfl:       Objective:     Vitals:    06/13/25 1400   BP: 144/84   BP Location: Left arm   Patient Position: Sitting   Pulse: 97   Weight: 93.4 kg (206 lb)   Height: 160 cm (63\")     Body mass index is 36.49 kg/m².    PHYSICAL EXAM:    Vitals Reviewed.   General Appearance: No acute distress, well developed and well nourished.   Respiratory: No signs of respiratory distress. Respiration rhythm and depth normal.   Cardiovascular:  Heart Rate and Rhythm: Normal  Skin: Warm and dry.   Psychiatric: Patient alert and oriented to person, place, and time. Speech and behavior appropriate. Normal mood and affect.       ECG 12 Lead    Date/Time: 6/13/2025 2:43 PM  Performed by: Toni Alvarez APRN    Authorized by: Toni Alvarez APRN  Comparison: compared with previous ECG   Similar to previous ECG  Rhythm: sinus rhythm and paced            Assessment:       Diagnosis Plan   1. Nonischemic cardiomyopathy  Adult Transthoracic Echo Complete w/ Color, Spectral and Contrast if Necessary Per Protocol      2. AV block, complete   "      3. Nonrheumatic tricuspid valve regurgitation               Plan:   1-2.  Nonischemic cardiomyopathy, complete heart block--- status post CRT----her device testing infection.  Looks great post extraction and reimplant of new RV lead.  Her thresholds are 0.75 and 0.4.  Her battery life now estimated about 9 years.    3.  Tricuspid valve regurgitation--- she has had more shortness of breath and more dyspnea than she was having before.  There was some concern on her CHANDANA imaging during the procedure that she had worsening tricuspid valve regurgitation.  I discussed with her and we are going to repeat an echo to evaluate.          Routine follow-up with device check in 3 months.            I spent at least 30 minutes reviewing previous notes, labs, EKGs, device reports and/or time with the patient.         As always, it has been a pleasure to participate in your patient's care.      Sincerely,         RAHAT Pena

## 2025-06-16 RX ORDER — RIVAROXABAN 20 MG/1
20 TABLET, FILM COATED ORAL
Qty: 90 TABLET | Refills: 1 | Status: SHIPPED | OUTPATIENT
Start: 2025-06-16

## 2025-06-17 DIAGNOSIS — I10 PRIMARY HYPERTENSION: ICD-10-CM

## 2025-06-17 DIAGNOSIS — I42.8 NONISCHEMIC CARDIOMYOPATHY: Primary | ICD-10-CM

## 2025-06-17 DIAGNOSIS — I36.1 NONRHEUMATIC TRICUSPID VALVE REGURGITATION: ICD-10-CM

## 2025-06-17 DIAGNOSIS — I48.19 PERSISTENT ATRIAL FIBRILLATION: ICD-10-CM

## 2025-06-17 DIAGNOSIS — I48.92 ATRIAL FLUTTER WITH CONTROLLED RESPONSE: ICD-10-CM

## 2025-06-26 ENCOUNTER — TELEPHONE (OUTPATIENT)
Dept: CARDIOLOGY | Age: 60
End: 2025-06-26
Payer: OTHER GOVERNMENT

## 2025-06-27 ENCOUNTER — HOSPITAL ENCOUNTER (OUTPATIENT)
Dept: CARDIOLOGY | Facility: HOSPITAL | Age: 60
Discharge: HOME | End: 2025-06-27
Payer: OTHER GOVERNMENT

## 2025-06-27 VITALS
HEART RATE: 88 BPM | OXYGEN SATURATION: 99 % | WEIGHT: 206 LBS | HEIGHT: 63 IN | BODY MASS INDEX: 36.5 KG/M2 | DIASTOLIC BLOOD PRESSURE: 82 MMHG | SYSTOLIC BLOOD PRESSURE: 148 MMHG

## 2025-06-27 DIAGNOSIS — I42.8 NONISCHEMIC CARDIOMYOPATHY: ICD-10-CM

## 2025-06-27 LAB
AORTIC ARCH: 2.7 CM
AORTIC DIMENSIONLESS INDEX: 0.49 (DI)
ASCENDING AORTA: 3.2 CM
AV MEAN PRESS GRAD SYS DOP V1V2: 3 MMHG
AV VMAX SYS DOP: 115 CM/SEC
BH CV ECHO LEFT VENTRICLE GLOBAL LONGITUDINAL STRAIN: -12.3 %
BH CV ECHO MEAS - ACS: 2.13 CM
BH CV ECHO MEAS - AO MAX PG: 5.3 MMHG
BH CV ECHO MEAS - AO ROOT DIAM: 3.1 CM
BH CV ECHO MEAS - AO V2 VTI: 21.7 CM
BH CV ECHO MEAS - AVA(I,D): 1.74 CM2
BH CV ECHO MEAS - EDV(CUBED): 146.3 ML
BH CV ECHO MEAS - EDV(MOD-SP2): 131 ML
BH CV ECHO MEAS - EDV(MOD-SP4): 132 ML
BH CV ECHO MEAS - EF(MOD-SP2): 27.5 %
BH CV ECHO MEAS - EF(MOD-SP4): 31.1 %
BH CV ECHO MEAS - ESV(CUBED): 78.5 ML
BH CV ECHO MEAS - ESV(MOD-SP2): 95 ML
BH CV ECHO MEAS - ESV(MOD-SP4): 91 ML
BH CV ECHO MEAS - FS: 18.7 %
BH CV ECHO MEAS - IVS/LVPW: 0.85 CM
BH CV ECHO MEAS - IVSD: 0.9 CM
BH CV ECHO MEAS - LAT PEAK E' VEL: 5.4 CM/SEC
BH CV ECHO MEAS - LV DIASTOLIC VOL/BSA (35-75): 67.4 CM2
BH CV ECHO MEAS - LV MASS(C)D: 193 GRAMS
BH CV ECHO MEAS - LV MAX PG: 1.32 MMHG
BH CV ECHO MEAS - LV MEAN PG: 1 MMHG
BH CV ECHO MEAS - LV SYSTOLIC VOL/BSA (12-30): 46.5 CM2
BH CV ECHO MEAS - LV V1 MAX: 57.5 CM/SEC
BH CV ECHO MEAS - LV V1 VTI: 10.6 CM
BH CV ECHO MEAS - LVIDD: 5.3 CM
BH CV ECHO MEAS - LVIDS: 4.3 CM
BH CV ECHO MEAS - LVOT AREA: 3.6 CM2
BH CV ECHO MEAS - LVOT DIAM: 2.13 CM
BH CV ECHO MEAS - LVPWD: 1.06 CM
BH CV ECHO MEAS - MED PEAK E' VEL: 3.4 CM/SEC
BH CV ECHO MEAS - MR MAX PG: 32.2 MMHG
BH CV ECHO MEAS - MR MAX VEL: 283.7 CM/SEC
BH CV ECHO MEAS - MV A DUR: 0.09 SEC
BH CV ECHO MEAS - MV A MAX VEL: 105 CM/SEC
BH CV ECHO MEAS - MV DEC SLOPE: 217 CM/SEC2
BH CV ECHO MEAS - MV DEC TIME: 0.2 SEC
BH CV ECHO MEAS - MV E MAX VEL: 70.3 CM/SEC
BH CV ECHO MEAS - MV E/A: 0.67
BH CV ECHO MEAS - MV MAX PG: 2.9 MMHG
BH CV ECHO MEAS - MV MEAN PG: 1.23 MMHG
BH CV ECHO MEAS - MV P1/2T: 100.8 MSEC
BH CV ECHO MEAS - MV V2 VTI: 27.6 CM
BH CV ECHO MEAS - MVA(P1/2T): 2.18 CM2
BH CV ECHO MEAS - MVA(VTI): 1.37 CM2
BH CV ECHO MEAS - PA ACC TIME: 0.15 SEC
BH CV ECHO MEAS - PA V2 MAX: 81.4 CM/SEC
BH CV ECHO MEAS - PULM A REVS DUR: 0.1 SEC
BH CV ECHO MEAS - PULM A REVS VEL: 23.8 CM/SEC
BH CV ECHO MEAS - PULM DIAS VEL: 35.1 CM/SEC
BH CV ECHO MEAS - PULM S/D: 0.79
BH CV ECHO MEAS - PULM SYS VEL: 27.6 CM/SEC
BH CV ECHO MEAS - QP/QS: 1.28
BH CV ECHO MEAS - RAP SYSTOLE: 15 MMHG
BH CV ECHO MEAS - RV MAX PG: 1.34 MMHG
BH CV ECHO MEAS - RV V1 MAX: 57.8 CM/SEC
BH CV ECHO MEAS - RV V1 VTI: 13.8 CM
BH CV ECHO MEAS - RVOT DIAM: 2.11 CM
BH CV ECHO MEAS - RVSP: 34.6 MMHG
BH CV ECHO MEAS - SUP REN AO DIAM: 1.9 CM
BH CV ECHO MEAS - SV(LVOT): 37.7 ML
BH CV ECHO MEAS - SV(MOD-SP2): 36 ML
BH CV ECHO MEAS - SV(MOD-SP4): 41 ML
BH CV ECHO MEAS - SV(RVOT): 48.3 ML
BH CV ECHO MEAS - SVI(LVOT): 19.3 ML/M2
BH CV ECHO MEAS - SVI(MOD-SP2): 18.4 ML/M2
BH CV ECHO MEAS - SVI(MOD-SP4): 20.9 ML/M2
BH CV ECHO MEAS - TAPSE (>1.6): 1.99 CM
BH CV ECHO MEAS - TR MAX PG: 19.6 MMHG
BH CV ECHO MEAS - TR MAX VEL: 221.3 CM/SEC
BH CV ECHO MEASUREMENTS AVERAGE E/E' RATIO: 15.98
BH CV XLRA - RV BASE: 3 CM
BH CV XLRA - RV LENGTH: 9 CM
BH CV XLRA - RV MID: 2.8 CM
BH CV XLRA - TDI S': 10.3 CM/SEC
LEFT ATRIUM VOLUME INDEX: 23.2 ML/M2
LV EF BIPLANE MOD: 29.7 %
SINUS: 3.1 CM
STJ: 2.6 CM
TV MEAN GRADIENT: 4 MMHG
TV PEAK GRADIENT: 8 MMHG

## 2025-06-27 PROCEDURE — 25510000001 PERFLUTREN 6.52 MG/ML SUSPENSION 2 ML VIAL

## 2025-06-27 PROCEDURE — 93356 MYOCRD STRAIN IMG SPCKL TRCK: CPT

## 2025-06-27 PROCEDURE — 93306 TTE W/DOPPLER COMPLETE: CPT

## 2025-06-27 RX ADMIN — PERFLUTREN 3 ML: 6.52 INJECTION, SUSPENSION INTRAVENOUS at 16:02

## 2025-07-01 DIAGNOSIS — I42.8 NONISCHEMIC CARDIOMYOPATHY: Primary | ICD-10-CM

## 2025-07-01 RX ORDER — CARVEDILOL 3.12 MG/1
3.12 TABLET ORAL 2 TIMES DAILY
Qty: 180 TABLET | Refills: 1 | Status: SHIPPED | OUTPATIENT
Start: 2025-07-01

## 2025-07-01 RX ORDER — SACUBITRIL AND VALSARTAN 24; 26 MG/1; MG/1
1 TABLET, FILM COATED ORAL 2 TIMES DAILY
Qty: 60 TABLET | Refills: 2 | Status: SHIPPED | OUTPATIENT
Start: 2025-07-01

## 2025-07-08 ENCOUNTER — OFFICE VISIT (OUTPATIENT)
Dept: FAMILY MEDICINE CLINIC | Facility: CLINIC | Age: 60
End: 2025-07-08
Payer: OTHER GOVERNMENT

## 2025-07-08 VITALS
HEIGHT: 63 IN | TEMPERATURE: 98.2 F | HEART RATE: 77 BPM | WEIGHT: 204 LBS | BODY MASS INDEX: 36.14 KG/M2 | SYSTOLIC BLOOD PRESSURE: 132 MMHG | OXYGEN SATURATION: 99 % | DIASTOLIC BLOOD PRESSURE: 86 MMHG

## 2025-07-08 DIAGNOSIS — I10 PRIMARY HYPERTENSION: ICD-10-CM

## 2025-07-08 DIAGNOSIS — I48.19 PERSISTENT ATRIAL FIBRILLATION: ICD-10-CM

## 2025-07-08 DIAGNOSIS — Z00.00 ANNUAL PHYSICAL EXAM: Primary | ICD-10-CM

## 2025-07-08 DIAGNOSIS — E06.3 HASHIMOTO'S THYROIDITIS: ICD-10-CM

## 2025-07-08 DIAGNOSIS — Z23 IMMUNIZATION DUE: ICD-10-CM

## 2025-07-08 DIAGNOSIS — M05.9 RHEUMATOID ARTHRITIS WITH POSITIVE RHEUMATOID FACTOR, INVOLVING UNSPECIFIED SITE: ICD-10-CM

## 2025-07-08 LAB
ALBUMIN SERPL-MCNC: 4 G/DL (ref 3.5–5.2)
ALBUMIN/GLOB SERPL: 1.3 G/DL
ALP SERPL-CCNC: 165 U/L (ref 39–117)
ALT SERPL-CCNC: 16 U/L (ref 1–33)
AST SERPL-CCNC: 16 U/L (ref 1–32)
BASOPHILS # BLD AUTO: 0.03 10*3/MM3 (ref 0–0.2)
BASOPHILS NFR BLD AUTO: 0.4 % (ref 0–1.5)
BILIRUB SERPL-MCNC: 0.6 MG/DL (ref 0–1.2)
BUN SERPL-MCNC: 15 MG/DL (ref 8–23)
BUN/CREAT SERPL: 16 (ref 7–25)
CALCIUM SERPL-MCNC: 8.5 MG/DL (ref 8.6–10.5)
CHLORIDE SERPL-SCNC: 104 MMOL/L (ref 98–107)
CHOLEST SERPL-MCNC: 110 MG/DL (ref 0–200)
CO2 SERPL-SCNC: 27 MMOL/L (ref 22–29)
CREAT SERPL-MCNC: 0.94 MG/DL (ref 0.57–1)
EGFRCR SERPLBLD CKD-EPI 2021: 69.6 ML/MIN/1.73
EOSINOPHIL # BLD AUTO: 0.3 10*3/MM3 (ref 0–0.4)
EOSINOPHIL NFR BLD AUTO: 3.7 % (ref 0.3–6.2)
ERYTHROCYTE [DISTWIDTH] IN BLOOD BY AUTOMATED COUNT: 14.9 % (ref 12.3–15.4)
GLOBULIN SER CALC-MCNC: 3.2 GM/DL
GLUCOSE SERPL-MCNC: 95 MG/DL (ref 65–99)
HCT VFR BLD AUTO: 41.2 % (ref 34–46.6)
HDLC SERPL-MCNC: 39 MG/DL (ref 40–60)
HGB BLD-MCNC: 12.6 G/DL (ref 12–15.9)
IMM GRANULOCYTES # BLD AUTO: 0.03 10*3/MM3 (ref 0–0.05)
IMM GRANULOCYTES NFR BLD AUTO: 0.4 % (ref 0–0.5)
LDLC SERPL CALC-MCNC: 57 MG/DL (ref 0–100)
LYMPHOCYTES # BLD AUTO: 1.28 10*3/MM3 (ref 0.7–3.1)
LYMPHOCYTES NFR BLD AUTO: 15.8 % (ref 19.6–45.3)
MCH RBC QN AUTO: 25.4 PG (ref 26.6–33)
MCHC RBC AUTO-ENTMCNC: 30.6 G/DL (ref 31.5–35.7)
MCV RBC AUTO: 83.1 FL (ref 79–97)
MONOCYTES # BLD AUTO: 0.8 10*3/MM3 (ref 0.1–0.9)
MONOCYTES NFR BLD AUTO: 9.9 % (ref 5–12)
NEUTROPHILS # BLD AUTO: 5.68 10*3/MM3 (ref 1.7–7)
NEUTROPHILS NFR BLD AUTO: 69.8 % (ref 42.7–76)
NRBC BLD AUTO-RTO: 0 /100 WBC (ref 0–0.2)
PLATELET # BLD AUTO: 268 10*3/MM3 (ref 140–450)
POTASSIUM SERPL-SCNC: 4.5 MMOL/L (ref 3.5–5.2)
PROT SERPL-MCNC: 7.2 G/DL (ref 6–8.5)
RBC # BLD AUTO: 4.96 10*6/MM3 (ref 3.77–5.28)
SODIUM SERPL-SCNC: 141 MMOL/L (ref 136–145)
TRIGL SERPL-MCNC: 68 MG/DL (ref 0–150)
VLDLC SERPL CALC-MCNC: 14 MG/DL (ref 5–40)
WBC # BLD AUTO: 8.12 10*3/MM3 (ref 3.4–10.8)

## 2025-07-08 PROCEDURE — 90471 IMMUNIZATION ADMIN: CPT | Performed by: NURSE PRACTITIONER

## 2025-07-08 PROCEDURE — 90715 TDAP VACCINE 7 YRS/> IM: CPT | Performed by: NURSE PRACTITIONER

## 2025-07-08 PROCEDURE — 99396 PREV VISIT EST AGE 40-64: CPT | Performed by: NURSE PRACTITIONER

## 2025-07-08 NOTE — PROGRESS NOTES
"Chief Complaint  Annual Exam (Fasting/Due TDAP)    Subjective        Whitney Schofield presents to [facility]  History of Present Illness  Patient presents office today for annual physical exam.  Blood pressure today 132/86.  With pacemaker stable, she denies active chest pain shortness of air.  Patient is following cardiology and scheduled for an updated echo with CHANDANA.  Patient is currently taking Entresto, carvedilol and Bumex.  With rheumatoid arthritis stable plan following rheumatology.  With endocrinology last TSH level 0.2.   Hashimoto's thyroiditis          Objective   Vital Signs:  /86 (BP Location: Left arm, Patient Position: Sitting, Cuff Size: Adult)   Pulse 77   Temp 98.2 °F (36.8 °C)   Ht 160 cm (62.99\")   Wt 92.5 kg (204 lb)   SpO2 99%   BMI 36.15 kg/m²   Estimated body mass index is 36.15 kg/m² as calculated from the following:    Height as of this encounter: 160 cm (62.99\").    Weight as of this encounter: 92.5 kg (204 lb).    Class 2 Severe Obesity (BMI >=35 and <=39.9). Obesity-related health conditions include the following: hypertension and dyslipidemias. Obesity is unchanged. BMI is is above average; BMI management plan is completed. We discussed portion control and increasing exercise.      Physical Exam  Constitutional:       General: She is not in acute distress.     Appearance: Normal appearance.   HENT:      Head: Normocephalic.      Right Ear: Tympanic membrane, ear canal and external ear normal.      Left Ear: Tympanic membrane, ear canal and external ear normal.      Nose: Nose normal.      Mouth/Throat:      Mouth: Mucous membranes are moist.   Eyes:      Pupils: Pupils are equal, round, and reactive to light.   Cardiovascular:      Rate and Rhythm: Normal rate.      Pulses: Normal pulses.      Heart sounds: Normal heart sounds.   Pulmonary:      Effort: Pulmonary effort is normal.      Breath sounds: Normal breath sounds.   Abdominal:      " General: Bowel sounds are normal.      Palpations: Abdomen is soft.   Musculoskeletal:         General: Normal range of motion.      Cervical back: Normal range of motion and neck supple.   Skin:     General: Skin is warm.   Neurological:      General: No focal deficit present.      Mental Status: She is alert and oriented to person, place, and time.   Psychiatric:         Mood and Affect: Mood normal.         Behavior: Behavior normal.         Thought Content: Thought content normal.         Judgment: Judgment normal.        Result Review :  The following data was reviewed by: RAHAT Starks on 07/08/2025:  Common labs          4/8/2025    02:37 4/15/2025    11:43 4/29/2025    09:44   Common Labs   Glucose 160  95  91    BUN 14  13  18    Creatinine 0.89  0.89  0.93    Sodium 142  141  143    Potassium 4.2  4.2  4.3    Chloride 105  104  107    Calcium 8.1  8.4  8.3    WBC 11.39  7.5  7.01    Hemoglobin 11.4  10.5  10.9    Hematocrit 36.3  32.9  35.4    Platelets 216  265  243      Data reviewed : Radiologic studies CT HEAD          Assessment and Plan   Diagnoses and all orders for this visit:    1. Annual physical exam (Primary)  -     CBC & Differential  -     Comprehensive Metabolic Panel  -     Lipid Panel    2. Immunization due  -     Tdap Vaccine => 8yo IM (BOOSTRIX/ADACEL)    3. Primary hypertension  Assessment & Plan:  Hypertension is stable and controlled  Continue current treatment regimen.  Blood pressure will be reassessed in 6 months.      4. Persistent atrial fibrillation  Assessment & Plan:  Stable follows cardiology.      5. Hashimoto's thyroiditis  Assessment & Plan:  Stable follows endocrinologist.      6. Rheumatoid arthritis with positive rheumatoid factor, involving unspecified site    Counseling was provided on nutrition, physical activity, development, and injury prevention, dental health, and safe sex practices patient verbalizes understanding no additional questions were  asked.         I spent 30 minutes caring for Whitney on this date of service. This time includes time spent by me in the following activities:preparing for the visit, reviewing tests, obtaining and/or reviewing a separately obtained history, performing a medically appropriate examination and/or evaluation , counseling and educating the patient/family/caregiver, ordering medications, tests, or procedures, documenting information in the medical record, independently interpreting results and communicating that information with the patient/family/caregiver, and care coordination  Follow Up   Return in about 4 months (around 11/3/2025) for Recheck.  Patient was given instructions and counseling regarding her condition or for health maintenance advice. Please see specific information pulled into the AVS if appropriate.

## 2025-07-15 DIAGNOSIS — R74.8 ELEVATED ALKALINE PHOSPHATASE LEVEL: ICD-10-CM

## 2025-07-15 DIAGNOSIS — E83.51 LOW CALCIUM LEVELS: Primary | ICD-10-CM

## 2025-07-16 ENCOUNTER — HOSPITAL ENCOUNTER (OUTPATIENT)
Dept: CARDIOLOGY | Facility: HOSPITAL | Age: 60
Discharge: HOME | End: 2025-07-16
Payer: OTHER GOVERNMENT

## 2025-07-16 VITALS
WEIGHT: 202.82 LBS | BODY MASS INDEX: 35.94 KG/M2 | RESPIRATION RATE: 16 BRPM | DIASTOLIC BLOOD PRESSURE: 69 MMHG | SYSTOLIC BLOOD PRESSURE: 155 MMHG | HEIGHT: 63 IN | OXYGEN SATURATION: 98 % | HEART RATE: 74 BPM

## 2025-07-16 DIAGNOSIS — I42.8 NONISCHEMIC CARDIOMYOPATHY: ICD-10-CM

## 2025-07-16 PROCEDURE — 25010000002 MIDAZOLAM PER 1 MG: Performed by: INTERNAL MEDICINE

## 2025-07-16 PROCEDURE — 25010000002 FENTANYL CITRATE (PF) 50 MCG/ML SOLUTION: Performed by: INTERNAL MEDICINE

## 2025-07-16 PROCEDURE — 93312 ECHO TRANSESOPHAGEAL: CPT

## 2025-07-16 RX ORDER — MIDAZOLAM HYDROCHLORIDE 1 MG/ML
INJECTION, SOLUTION INTRAMUSCULAR; INTRAVENOUS
Status: COMPLETED | OUTPATIENT
Start: 2025-07-16 | End: 2025-07-16

## 2025-07-16 RX ORDER — LIDOCAINE HYDROCHLORIDE 20 MG/ML
SOLUTION OROPHARYNGEAL
Status: COMPLETED | OUTPATIENT
Start: 2025-07-16 | End: 2025-07-16

## 2025-07-16 RX ORDER — FENTANYL CITRATE 50 UG/ML
INJECTION, SOLUTION INTRAMUSCULAR; INTRAVENOUS
Status: COMPLETED | OUTPATIENT
Start: 2025-07-16 | End: 2025-07-16

## 2025-07-16 RX ORDER — SODIUM CHLORIDE 9 MG/ML
INJECTION, SOLUTION INTRAVENOUS
Status: COMPLETED | OUTPATIENT
Start: 2025-07-16 | End: 2025-07-16

## 2025-07-16 RX ADMIN — FENTANYL CITRATE 25 MCG: 50 INJECTION INTRAMUSCULAR; INTRAVENOUS at 11:27

## 2025-07-16 RX ADMIN — FENTANYL CITRATE 25 MCG: 50 INJECTION INTRAMUSCULAR; INTRAVENOUS at 11:38

## 2025-07-16 RX ADMIN — LIDOCAINE HYDROCHLORIDE 10 ML: 20 SOLUTION ORAL at 11:00

## 2025-07-16 RX ADMIN — MIDAZOLAM 1 MG: 1 INJECTION INTRAMUSCULAR; INTRAVENOUS at 11:42

## 2025-07-16 RX ADMIN — FENTANYL CITRATE 12.5 MCG: 50 INJECTION INTRAMUSCULAR; INTRAVENOUS at 11:34

## 2025-07-16 RX ADMIN — MIDAZOLAM 2 MG: 1 INJECTION INTRAMUSCULAR; INTRAVENOUS at 11:26

## 2025-07-16 RX ADMIN — MIDAZOLAM 2 MG: 1 INJECTION INTRAMUSCULAR; INTRAVENOUS at 11:38

## 2025-07-16 RX ADMIN — MIDAZOLAM 1 MG: 1 INJECTION INTRAMUSCULAR; INTRAVENOUS at 11:34

## 2025-07-16 RX ADMIN — FENTANYL CITRATE 25 MCG: 50 INJECTION INTRAMUSCULAR; INTRAVENOUS at 11:42

## 2025-07-16 RX ADMIN — MIDAZOLAM 2 MG: 1 INJECTION INTRAMUSCULAR; INTRAVENOUS at 11:30

## 2025-07-16 RX ADMIN — SODIUM CHLORIDE 50 ML/HR: 9 INJECTION, SOLUTION INTRAVENOUS at 10:40

## 2025-07-17 DIAGNOSIS — I36.1 NONRHEUMATIC TRICUSPID VALVE REGURGITATION: Primary | ICD-10-CM

## 2025-07-23 ENCOUNTER — TELEPHONE (OUTPATIENT)
Dept: CARDIOLOGY | Age: 60
End: 2025-07-23
Payer: OTHER GOVERNMENT

## 2025-07-23 ENCOUNTER — OFFICE VISIT (OUTPATIENT)
Dept: CARDIOLOGY | Age: 60
End: 2025-07-23
Payer: OTHER GOVERNMENT

## 2025-07-23 ENCOUNTER — TELEPHONE (OUTPATIENT)
Dept: CARDIOLOGY | Age: 60
End: 2025-07-23

## 2025-07-23 VITALS
WEIGHT: 202.6 LBS | DIASTOLIC BLOOD PRESSURE: 75 MMHG | SYSTOLIC BLOOD PRESSURE: 118 MMHG | BODY MASS INDEX: 35.9 KG/M2 | HEART RATE: 70 BPM | OXYGEN SATURATION: 97 %

## 2025-07-23 DIAGNOSIS — I10 PRIMARY HYPERTENSION: Primary | ICD-10-CM

## 2025-07-23 DIAGNOSIS — T82.110D PACEMAKER LEAD MALFUNCTION, SUBSEQUENT ENCOUNTER: Primary | ICD-10-CM

## 2025-07-23 DIAGNOSIS — I48.19 PERSISTENT ATRIAL FIBRILLATION: ICD-10-CM

## 2025-07-23 DIAGNOSIS — I07.1 TRICUSPID VALVE INSUFFICIENCY, UNSPECIFIED ETIOLOGY: ICD-10-CM

## 2025-07-23 DIAGNOSIS — I42.8 NONISCHEMIC CARDIOMYOPATHY: ICD-10-CM

## 2025-07-23 DIAGNOSIS — I10 PRIMARY HYPERTENSION: ICD-10-CM

## 2025-07-23 NOTE — TELEPHONE ENCOUNTER
Procedure Confirmed With Patient On: 7.23.2025    Date Scheduled & Patient Was Given Instructions:   7.23.2025                  Via: IN OFFICE      Patient Verbalized Understanding Of Arrival Time (5:30 am) Where To Park, Instructions For Procedure, and Medications To Hold: Yes

## 2025-07-23 NOTE — TELEPHONE ENCOUNTER
Please let Whitney know I reviewed our visit this morning and her symptoms with Dr. Viera    She would like her to add jardiance - take this around noon each day - ordered     Have her call or message me in one to two weeks to let us know what BP and swelling is like with this.     Thanks                                   (Note to provider: jardiance is preferred with her insurance, farxiga is not covered)

## 2025-07-23 NOTE — TELEPHONE ENCOUNTER
Called and left VM, will continue to try to reach pt.    HUB- please put patient straight through to triage    Jacquelin Law, ANJALI  Triage RN  07/23/25 12:25 EDT

## 2025-07-23 NOTE — PROGRESS NOTES
Date of Office Visit: 2025  Encounter Provider: RAHAT Hernandez  Place of Service: [facility] CARDIOLOGY  Established cardiologist: Yennifer Viera MD  Patient Name: Whitney Schofield  :1965      Patient ID:  Whitney Schofield is a 59 y.o. 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   She is , has 2 children, works as a , has never smoked, uses no alcohol and has 1 cup of coffee per day.    History of Present Illness  She was seen at [facility] 2009 complaints of palpitation and shortness of breath.  She was subsequently diagnosed with Hashimoto thyroiditis and underwent partial thyroidectomy 2010.     In 2010 she had a 2D echo for follow-up which showed a severely dilated RV and significant pulmonary hypertension.  CT scan at that time did not show any evidence of PE or acute lung pathology.  She had a CHANDANA 2010 which showed a large secundum ASD high in the atrial septum.  She was also noted to have a myxomatous tricuspid valve with moderate TR.  She was referred to Dr. Harper for surgical repair of ASD.  Preop cardiac catheterization 3/26/2010 showed normal coronary arteries and finding consistent with a large shunt.  She underwent closure of the ASD and tricuspid valve repair with annuloplasty ring with Dr. Harper at [facility] on May 6, 2010.  She developed a complete AV block postop with accelerated junctional escape rhythm and had a permanent pacemaker  placed May 11, 2010.     May 2011 she was having significant shortness of breath and a 24-hour Holter monitor showed brief episodes of tachycardia.  An echocardiogram showed shuttering motion of the septum.  She underwent a right and left heart cath July 20, 2011 to assess for any evidence of constriction and there was no evidence of restriction or constriction though she did have pulmonary hypertension with a mean pulmonary artery pressure of 30 mmHg. CHANDANA 6/20/2011 showed an EF of 55 to 60%, intact ASD repair.  EP study with Dr. Foster August 2011 showed no inducible tachycardia or VT.     2012 she wore a 21-day event recorder and was noted to have gone into wide-complex tachycardia.  She saw Dr. Aguilar with EP and was felt this was VT.  She ultimately underwent a repeat EP study November 2012 with Dr. Aguilar which confirmed a complete infrahisian block with no conduction retrograde or antegrade.  She was started on beta-blockers.     2013 developed severe edema shortness of breath and weight gain.  CHANDANA showed an EF of 35% and an extensive amount of thrombus formation on her pacemaker wires.  Her ASD repair was intact.  CT of the chest showed no PE, she was IV diuresed and placed on warfarin.  It was felt she could have a pacemaker induced cardiomyopathy.  She underwent LV lead placement and pacemaker replacement 8/19/2013.  She then had right atrial lead dislodgment and diaphragmatic stimulation.  A right atrial lead revision was done 2/19/2014.  However she developed significant left subclavian vein thrombosis postprocedure and was again placed on warfarin.  This was ultimately changed to rivaroxaban.  She ultimately underwent laser lead extraction of the abandoned right atrial lead 4/17/2014.  A right heart cath 4/13/2015 showed only mild pulmonary hypertension.     Echo done 6/20/2022 shows ejection fraction of 51-55% with septal wall motion abnormality consistent with RV pacing, grade 1 diastolic dysfunction,  mildly dilated left atrium with mild tricuspid insufficiency, no pericardial effusion and normal RVSP.  She had a nonischemic stress nuclear perfusion study done 10/10/2018.  She had normal bilateral lower extremity venous duplex study done 1/26/2022.        TTE done June 12, 2024 with a EF 50 to 55%, mild LV dilation, tricuspid annuloplasty which was normal, normal RVSP, no shunt on Doppler across the interatrial septum, severe left atrial enlargement.  CHANDANA 7/18/2024 showed mildly dilated LV with EF 50%, normal RV size and function, moderate to severe left atrial enlargement, no left atrial appendage thrombus, normal saline study status post ASD patch repair, mild mitral insufficiency, annuloplasty ring in the tricuspid position with trace to mild insufficiency, normal RVSP.     7/18/2024 underwent cardioversion for A flutter converting back into normal sinus rhythm.  She saw Dr. Viera for follow-up in the office 8/22/2024.  Her metoprolol succinate was increased to 50 mg in the morning and 100 mg nightly.     She was diagnosed with COVID-19 10/4/2024.  She did have some shortness of breath with this.    She saw Dr. Lizama in the office 2/18/2025.  Her pacemaker leads were having continued rise in thresholds and that impedances were increasing steadily causing significant battery drain.  Leadless pacemaker versus device extraction to try to implant a new RV lead so she would not lose CRT pacing was discussed./7/25 she underwent pacemaker replacement and lead replacement with laser extraction.  6/13/2025 she saw Toni Alvarez in the office at this time her device looked good and battery life was 9 years.    6/27/2025 an echocardiogram done showed an ejection fraction of 29.7% GLS = -12.3%, mild to moderately dilated LV cavity, ASD or PFO closure and interatrial septum, right atrial lead, RV lead, trace mitral regurg, mild to moderate tricuspid regurg, RVSP 34.6 mmHg.    For this new decline in EF metoprolol was  changed to carvedilol, valsartan was discontinued in favor of Entresto.  Dr. Viera reviewed the echocardiogram and felt it was consistent with Takotsubo cardiomyopathy that she may end up needing ischemic evaluation.    7/16/2025 CHANDANA unable to be completed due to several attempts to place the probe in the esophagus with excessive gag reflex causing resistance procedure was aborted.    CHANDANA with general anesthesia was then ordered, not yet completed.     Today Whitney presents for follow-up.  She has continued to experience shortness of breath since procedure in April this has been about the same and has not improved nor worsened.  She has had more swelling and weight gain when she takes Bumex it does resolve, but because she works full-time she is unable to take this every morning as it affects her work with the frequent urination that follows.  When her blood pressure is high or she feels stressed she does experience chest pressure.  Otherwise there are no other chest pains or pressures.  She has had some labile blood pressure readings systolic ranging from the 90s to the 160s so sometimes she gets dizzy when it is low.  No presyncope or syncope associated with this.    Current Outpatient Medications on File Prior to Visit   Medication Sig Dispense Refill    bumetanide (BUMEX) 1 MG tablet Take 1 tablet by mouth Daily As Needed (swelling). STATES BEEN A LONG TIME SINCE SHE HAS TAKEN 30 tablet 1    carvedilol (COREG) 3.125 MG tablet Take 1 tablet by mouth 2 (Two) Times a Day. 180 tablet 1    Ferrous Sulfate Dried 200 (65 FE) MG tablet Take 1 tablet by mouth Daily.      folic acid (FOLVITE) 1 MG tablet Take 1 tablet by mouth Daily.      HYDROcodone-acetaminophen (NORCO)  MG per tablet Take 1 tablet by mouth Every 6 (Six) Hours As Needed (PAIN).      leflunomide (ARAVA) 20 MG tablet Take 1 tablet by mouth Daily.      levothyroxine (SYNTHROID, LEVOTHROID) 175 MCG tablet Take 1 tablet by mouth Daily.       methotrexate 2.5 MG tablet Take 8 tablets by mouth 1 (One) Time Per Week. WENDS      pantoprazole (PROTONIX) 40 MG EC tablet Take 1 tablet by mouth Daily.      rivaroxaban (Xarelto) 20 MG tablet TAKE 1 TABLET DAILY WITH DINNER 90 tablet 1    sacubitril-valsartan (Entresto) 24-26 MG tablet Take 1 tablet by mouth 2 (Two) Times a Day. 60 tablet 2    vitamin D (ERGOCALCIFEROL) 04652 units capsule capsule Take 1 capsule by mouth 3 (Three) Times a Week. M,W,F       No current facility-administered medications on file prior to visit.         Procedures    ECG 12 Lead    Date/Time: 7/23/2025 9:26 AM  Performed by: Kathy Cassidy APRN    Authorized by: Kathy Cassidy APRN  Comparison: compared with previous ECG from 8/22/2024  Comparison to previous ECG: Paced rhythm, 70 bpm              Objective:      Vitals:    07/23/25 0829   BP: 118/75   Pulse: 70   SpO2: 97%   Weight: 91.9 kg (202 lb 9.6 oz)     Body mass index is 35.9 kg/m².  Wt Readings from Last 3 Encounters:   07/23/25 91.9 kg (202 lb 9.6 oz)   07/16/25 92 kg (202 lb 13.2 oz)   07/08/25 92.5 kg (204 lb)     Constitutional:       Appearance: Healthy appearance. Not in distress.   Pulmonary:      Effort: Pulmonary effort is normal.      Breath sounds: Normal breath sounds.   Cardiovascular:      Normal rate. Regular rhythm.      No gallop.  No click. No rub.   Pulses:     Radial: 2+ bilaterally.     Dorsalis pedis: 2+ bilaterally.     Posterior tibial: 2+ bilaterally.  Edema:     Peripheral edema absent.   Skin:     General: Skin is warm and dry.   Neurological:      Mental Status: Alert and oriented to person, place and time.     Speech is normal.     Lab Review:   7/8/2025 total cholesterol 110 TG 68 HDL 39 LDL 57.  Alk phos 165 otherwise unremarkable CMP.  Unremarkable CBC.    Assessment:     1. Pacemaker lead malfunction, subsequent encounter    2. Nonischemic cardiomyopathy    3. Persistent atrial fibrillation    4. Primary hypertension    5. Tricuspid  valve insufficiency, unspecified etiology      NICM due to chronic RV pacing, thrombus on pacing wires presented with acute HF in 2013-resolved with upgrade to CRT pacing.  Following lead extraction in 4/2025 she developed cardiomyopathy.  She is stable on present GDMT, has continued shortness of breath and swelling that does respond to Bumex she is unable to take it daily due to work.   PAF, atrial flutter, status post cardioversion 7/18/2024.   Complete AV block status post ppm 2010, EP study 2012 with complete infra -hisian block. LV lead placement and pacemaker replacement 8/2013.  Right atrial lead revision 2/2014.  Laser lead extraction of right atrial lead 4/2014.4/7/25 extraction of RV lead, placement of new RV pacing lead, placement of new CRT-P generator.  Hypertension, she is having labile readings at home.  Tricuspid valve insufficiency status post tricuspid repair with annuloplasty ring in 2010.  During procedure 4/2025 there may have been damage to the septal leaflet of the tricuspid valve.  Large secundum ASD status post patch repair in 2010  Rheumatoid arthritis, sees Dr. Ozuna.   Hashimoto's thyroiditis and history of thyroid cancer, follows with Dr. Bravo.   COVID-19 infection 10/4/2024    Plan:   No medication changes were made  She is going to schedule CHANDANA with anesthesia with hospital scheduling at check out today.   Continue present GDMT on bumetanide, carvedilol, sacubitril-valsartan.  We are not adding MRA at this time due to her labile blood pressures.   See me or Dr. Viera in 6 weeks    Thank you for allowing me to participate in this patient's care. Please call with any questions or concerns.        > 40 minutes spent in total pt care  I spent 5 minutes on the separately reported service of EKG. This time is not included in the time used to support the E/M service also reported today.    Dragon dictation device was utilized in this note.

## 2025-07-24 NOTE — TELEPHONE ENCOUNTER
Reviewed recommendations with patient, verbalized understanding, will call with any further questions or complaints.  Pt states that she will start jardiance and update Kathy in a couple of weeks as recommended.    Jacquelin Law RN  Triage Nurse  07/24/25 11:50 EDT

## 2025-07-25 DIAGNOSIS — I48.19 PERSISTENT ATRIAL FIBRILLATION: ICD-10-CM

## 2025-07-25 DIAGNOSIS — I36.1 NONRHEUMATIC TRICUSPID VALVE REGURGITATION: ICD-10-CM

## 2025-07-25 DIAGNOSIS — I48.92 ATRIAL FLUTTER WITH CONTROLLED RESPONSE: Primary | ICD-10-CM

## 2025-07-25 DIAGNOSIS — T82.110D PACEMAKER LEAD MALFUNCTION, SUBSEQUENT ENCOUNTER: ICD-10-CM

## 2025-07-25 DIAGNOSIS — I10 PRIMARY HYPERTENSION: ICD-10-CM

## 2025-08-06 LAB
MC_CV_MDC_IDC_RATE_1: 150
MC_CV_MDC_IDC_RATE_1: 171
MC_CV_MDC_IDC_THERAPIES: NORMAL
MC_CV_MDC_IDC_ZONE_ID: 2
MC_CV_MDC_IDC_ZONE_ID: 6
MDC_IDC_MSMT_BATTERY_REMAINING_LONGEVITY: 90 MO
MDC_IDC_MSMT_BATTERY_RRT_TRIGGER: 2.6
MDC_IDC_MSMT_BATTERY_STATUS: NORMAL
MDC_IDC_MSMT_BATTERY_VOLTAGE: 3.04
MDC_IDC_MSMT_LEADCHNL_LV_IMPEDANCE_VALUE: 361
MDC_IDC_MSMT_LEADCHNL_RA_DTM: NORMAL
MDC_IDC_MSMT_LEADCHNL_RA_IMPEDANCE_VALUE: 475
MDC_IDC_MSMT_LEADCHNL_RA_PACING_THRESHOLD_AMPLITUDE: 2.38
MDC_IDC_MSMT_LEADCHNL_RA_PACING_THRESHOLD_POLARITY: NORMAL
MDC_IDC_MSMT_LEADCHNL_RA_PACING_THRESHOLD_PULSEWIDTH: 0.4
MDC_IDC_MSMT_LEADCHNL_RA_SENSING_INTR_AMPL: 1.88
MDC_IDC_MSMT_LEADCHNL_RV_DTM: NORMAL
MDC_IDC_MSMT_LEADCHNL_RV_IMPEDANCE_VALUE: 589
MDC_IDC_MSMT_LEADCHNL_RV_PACING_THRESHOLD_AMPLITUDE: 0.62
MDC_IDC_MSMT_LEADCHNL_RV_PACING_THRESHOLD_POLARITY: NORMAL
MDC_IDC_MSMT_LEADCHNL_RV_PACING_THRESHOLD_PULSEWIDTH: 0.4
MDC_IDC_MSMT_LEADCHNL_RV_SENSING_INTR_AMPL: 9.62
MDC_IDC_PG_IMPLANT_DTM: NORMAL
MDC_IDC_PG_MFG: NORMAL
MDC_IDC_PG_MODEL: NORMAL
MDC_IDC_PG_SERIAL: NORMAL
MDC_IDC_PG_TYPE: NORMAL
MDC_IDC_SESS_DTM: NORMAL
MDC_IDC_SESS_TYPE: NORMAL
MDC_IDC_SET_BRADY_AT_MODE_SWITCH_RATE: 171
MDC_IDC_SET_BRADY_LOWRATE: 60
MDC_IDC_SET_BRADY_MAX_SENSOR_RATE: 115
MDC_IDC_SET_BRADY_MAX_TRACKING_RATE: 140
MDC_IDC_SET_BRADY_MODE: NORMAL
MDC_IDC_SET_BRADY_PAV_DELAY: 170
MDC_IDC_SET_BRADY_SAV_DELAY: 90
MDC_IDC_SET_CRT_LVRV_DELAY: 0
MDC_IDC_SET_CRT_PACED_CHAMBERS: NORMAL
MDC_IDC_SET_LEADCHNL_LV_PACING_AMPLITUDE: 2.5
MDC_IDC_SET_LEADCHNL_LV_PACING_POLARITY: NORMAL
MDC_IDC_SET_LEADCHNL_LV_PACING_PULSEWIDTH: 0.4
MDC_IDC_SET_LEADCHNL_RA_PACING_AMPLITUDE: 3.25
MDC_IDC_SET_LEADCHNL_RA_PACING_POLARITY: NORMAL
MDC_IDC_SET_LEADCHNL_RA_PACING_PULSEWIDTH: 0.4
MDC_IDC_SET_LEADCHNL_RA_SENSING_POLARITY: NORMAL
MDC_IDC_SET_LEADCHNL_RA_SENSING_SENSITIVITY: 0.3
MDC_IDC_SET_LEADCHNL_RV_PACING_AMPLITUDE: 3.25
MDC_IDC_SET_LEADCHNL_RV_PACING_POLARITY: NORMAL
MDC_IDC_SET_LEADCHNL_RV_PACING_PULSEWIDTH: 0.4
MDC_IDC_SET_LEADCHNL_RV_SENSING_POLARITY: NORMAL
MDC_IDC_SET_LEADCHNL_RV_SENSING_SENSITIVITY: 2.8
MDC_IDC_SET_ZONE_STATUS: NORMAL
MDC_IDC_SET_ZONE_STATUS: NORMAL
MDC_IDC_SET_ZONE_TYPE: NORMAL
MDC_IDC_SET_ZONE_TYPE: NORMAL
MDC_IDC_STAT_AT_BURDEN_PERCENT: 0
MDC_IDC_STAT_BRADY_RA_PERCENT_PACED: 10.2

## 2025-08-08 ENCOUNTER — LAB (OUTPATIENT)
Dept: LAB | Facility: HOSPITAL | Age: 60
End: 2025-08-08
Payer: OTHER GOVERNMENT

## 2025-08-08 DIAGNOSIS — I10 PRIMARY HYPERTENSION: ICD-10-CM

## 2025-08-08 LAB
ANION GAP SERPL CALCULATED.3IONS-SCNC: 8.4 MMOL/L (ref 5–15)
BASOPHILS # BLD AUTO: 0.04 10*3/MM3 (ref 0–0.2)
BASOPHILS NFR BLD AUTO: 0.5 % (ref 0–1.5)
BUN SERPL-MCNC: 18.7 MG/DL (ref 8–23)
BUN/CREAT SERPL: 19.7 (ref 7–25)
CALCIUM SPEC-SCNC: 8.6 MG/DL (ref 8.6–10.5)
CHLORIDE SERPL-SCNC: 107 MMOL/L (ref 98–107)
CO2 SERPL-SCNC: 26.6 MMOL/L (ref 22–29)
CREAT SERPL-MCNC: 0.95 MG/DL (ref 0.57–1)
DEPRECATED RDW RBC AUTO: 54.9 FL (ref 37–54)
EGFRCR SERPLBLD CKD-EPI 2021: 68.7 ML/MIN/1.73
EOSINOPHIL # BLD AUTO: 0.24 10*3/MM3 (ref 0–0.4)
EOSINOPHIL NFR BLD AUTO: 2.8 % (ref 0.3–6.2)
ERYTHROCYTE [DISTWIDTH] IN BLOOD BY AUTOMATED COUNT: 18.8 % (ref 12.3–15.4)
GLUCOSE SERPL-MCNC: 111 MG/DL (ref 65–99)
HCT VFR BLD AUTO: 39 % (ref 34–46.6)
HGB BLD-MCNC: 11.8 G/DL (ref 12–15.9)
IMM GRANULOCYTES # BLD AUTO: 0.03 10*3/MM3 (ref 0–0.05)
IMM GRANULOCYTES NFR BLD AUTO: 0.3 % (ref 0–0.5)
LYMPHOCYTES # BLD AUTO: 1.04 10*3/MM3 (ref 0.7–3.1)
LYMPHOCYTES NFR BLD AUTO: 12.1 % (ref 19.6–45.3)
MCH RBC QN AUTO: 24.9 PG (ref 26.6–33)
MCHC RBC AUTO-ENTMCNC: 30.3 G/DL (ref 31.5–35.7)
MCV RBC AUTO: 82.5 FL (ref 79–97)
MONOCYTES # BLD AUTO: 0.7 10*3/MM3 (ref 0.1–0.9)
MONOCYTES NFR BLD AUTO: 8.1 % (ref 5–12)
NEUTROPHILS NFR BLD AUTO: 6.57 10*3/MM3 (ref 1.7–7)
NEUTROPHILS NFR BLD AUTO: 76.2 % (ref 42.7–76)
NRBC BLD AUTO-RTO: 0 /100 WBC (ref 0–0.2)
PLATELET # BLD AUTO: 241 10*3/MM3 (ref 140–450)
PMV BLD AUTO: 9.3 FL (ref 6–12)
POTASSIUM SERPL-SCNC: 4.7 MMOL/L (ref 3.5–5.2)
RBC # BLD AUTO: 4.73 10*6/MM3 (ref 3.77–5.28)
SODIUM SERPL-SCNC: 142 MMOL/L (ref 136–145)
WBC NRBC COR # BLD AUTO: 8.62 10*3/MM3 (ref 3.4–10.8)

## 2025-08-08 PROCEDURE — 36415 COLL VENOUS BLD VENIPUNCTURE: CPT

## 2025-08-08 PROCEDURE — 85025 COMPLETE CBC W/AUTO DIFF WBC: CPT

## 2025-08-08 PROCEDURE — 80048 BASIC METABOLIC PNL TOTAL CA: CPT

## 2025-08-15 ENCOUNTER — HOSPITAL ENCOUNTER (OUTPATIENT)
Dept: POSTOP/PACU | Facility: HOSPITAL | Age: 60
Discharge: HOME | End: 2025-08-15
Payer: OTHER GOVERNMENT

## 2025-08-15 ENCOUNTER — ANESTHESIA (OUTPATIENT)
Dept: POSTOP/PACU | Facility: HOSPITAL | Age: 60
End: 2025-08-15
Payer: OTHER GOVERNMENT

## 2025-08-15 ENCOUNTER — ANESTHESIA EVENT (OUTPATIENT)
Dept: POSTOP/PACU | Facility: HOSPITAL | Age: 60
End: 2025-08-15
Payer: OTHER GOVERNMENT

## 2025-08-15 VITALS — DIASTOLIC BLOOD PRESSURE: 102 MMHG | SYSTOLIC BLOOD PRESSURE: 116 MMHG | OXYGEN SATURATION: 100 % | HEART RATE: 91 BPM

## 2025-08-15 VITALS
BODY MASS INDEX: 35.55 KG/M2 | DIASTOLIC BLOOD PRESSURE: 87 MMHG | SYSTOLIC BLOOD PRESSURE: 120 MMHG | TEMPERATURE: 97.9 F | WEIGHT: 200.62 LBS | OXYGEN SATURATION: 98 % | HEART RATE: 80 BPM | HEIGHT: 63 IN | RESPIRATION RATE: 16 BRPM

## 2025-08-15 DIAGNOSIS — I36.1 NONRHEUMATIC TRICUSPID VALVE REGURGITATION: ICD-10-CM

## 2025-08-15 LAB
AORTIC ARCH: 2.7 CM
AORTIC DIMENSIONLESS INDEX: 0.49 (DI)
ASCENDING AORTA: 3.2 CM
AV MEAN PRESS GRAD SYS DOP V1V2: 3 MMHG
AV VMAX SYS DOP: 115 CM/SEC
BH CV ECHO LEFT VENTRICLE GLOBAL LONGITUDINAL STRAIN: -12.3 %
BH CV ECHO MEAS - ACS: 2.13 CM
BH CV ECHO MEAS - AO MAX PG: 5.3 MMHG
BH CV ECHO MEAS - AO ROOT DIAM: 3.1 CM
BH CV ECHO MEAS - AO V2 VTI: 21.7 CM
BH CV ECHO MEAS - AVA(I,D): 1.74 CM2
BH CV ECHO MEAS - EDV(CUBED): 146.3 ML
BH CV ECHO MEAS - EDV(MOD-SP2): 131 ML
BH CV ECHO MEAS - EDV(MOD-SP4): 132 ML
BH CV ECHO MEAS - EF(MOD-SP2): 27.5 %
BH CV ECHO MEAS - EF(MOD-SP4): 31.1 %
BH CV ECHO MEAS - ESV(CUBED): 78.5 ML
BH CV ECHO MEAS - ESV(MOD-SP2): 95 ML
BH CV ECHO MEAS - ESV(MOD-SP4): 91 ML
BH CV ECHO MEAS - FS: 18.7 %
BH CV ECHO MEAS - IVS/LVPW: 0.85 CM
BH CV ECHO MEAS - IVSD: 0.9 CM
BH CV ECHO MEAS - LAT PEAK E' VEL: 5.4 CM/SEC
BH CV ECHO MEAS - LV DIASTOLIC VOL/BSA (35-75): 67.4 CM2
BH CV ECHO MEAS - LV MASS(C)D: 193 GRAMS
BH CV ECHO MEAS - LV MAX PG: 1.32 MMHG
BH CV ECHO MEAS - LV MEAN PG: 1 MMHG
BH CV ECHO MEAS - LV SYSTOLIC VOL/BSA (12-30): 46.5 CM2
BH CV ECHO MEAS - LV V1 MAX: 57.5 CM/SEC
BH CV ECHO MEAS - LV V1 VTI: 10.6 CM
BH CV ECHO MEAS - LVIDD: 5.3 CM
BH CV ECHO MEAS - LVIDS: 4.3 CM
BH CV ECHO MEAS - LVOT AREA: 3.6 CM2
BH CV ECHO MEAS - LVOT DIAM: 2.13 CM
BH CV ECHO MEAS - LVPWD: 1.06 CM
BH CV ECHO MEAS - MED PEAK E' VEL: 3.4 CM/SEC
BH CV ECHO MEAS - MR MAX PG: 105.4 MMHG
BH CV ECHO MEAS - MR MAX PG: 32.2 MMHG
BH CV ECHO MEAS - MR MAX VEL: 283.7 CM/SEC
BH CV ECHO MEAS - MR MAX VEL: 513.3 CM/SEC
BH CV ECHO MEAS - MV A DUR: 0.09 SEC
BH CV ECHO MEAS - MV A MAX VEL: 105 CM/SEC
BH CV ECHO MEAS - MV DEC SLOPE: 217 CM/SEC2
BH CV ECHO MEAS - MV DEC TIME: 0.2 SEC
BH CV ECHO MEAS - MV E MAX VEL: 70.3 CM/SEC
BH CV ECHO MEAS - MV E/A: 0.67
BH CV ECHO MEAS - MV MAX PG: 2.9 MMHG
BH CV ECHO MEAS - MV MEAN PG: 1.23 MMHG
BH CV ECHO MEAS - MV P1/2T: 100.8 MSEC
BH CV ECHO MEAS - MV V2 VTI: 27.6 CM
BH CV ECHO MEAS - MVA(P1/2T): 2.18 CM2
BH CV ECHO MEAS - MVA(VTI): 1.37 CM2
BH CV ECHO MEAS - PA ACC TIME: 0.15 SEC
BH CV ECHO MEAS - PA V2 MAX: 81.4 CM/SEC
BH CV ECHO MEAS - PULM A REVS DUR: 0.1 SEC
BH CV ECHO MEAS - PULM A REVS VEL: 23.8 CM/SEC
BH CV ECHO MEAS - PULM DIAS VEL: 35.1 CM/SEC
BH CV ECHO MEAS - PULM S/D: 0.79
BH CV ECHO MEAS - PULM SYS VEL: 27.6 CM/SEC
BH CV ECHO MEAS - QP/QS: 1.28
BH CV ECHO MEAS - RAP SYSTOLE: 15 MMHG
BH CV ECHO MEAS - RAP SYSTOLE: 3 MMHG
BH CV ECHO MEAS - RV MAX PG: 1.34 MMHG
BH CV ECHO MEAS - RV V1 MAX: 57.8 CM/SEC
BH CV ECHO MEAS - RV V1 VTI: 13.8 CM
BH CV ECHO MEAS - RVOT DIAM: 2.11 CM
BH CV ECHO MEAS - RVSP: 34.6 MMHG
BH CV ECHO MEAS - RVSP: 38 MMHG
BH CV ECHO MEAS - SUP REN AO DIAM: 1.9 CM
BH CV ECHO MEAS - SV(LVOT): 37.7 ML
BH CV ECHO MEAS - SV(MOD-SP2): 36 ML
BH CV ECHO MEAS - SV(MOD-SP4): 41 ML
BH CV ECHO MEAS - SV(RVOT): 48.3 ML
BH CV ECHO MEAS - SVI(LVOT): 19.3 ML/M2
BH CV ECHO MEAS - SVI(MOD-SP2): 18.4 ML/M2
BH CV ECHO MEAS - SVI(MOD-SP4): 20.9 ML/M2
BH CV ECHO MEAS - TAPSE (>1.6): 1.99 CM
BH CV ECHO MEAS - TR MAX PG: 19.6 MMHG
BH CV ECHO MEAS - TR MAX PG: 35 MMHG
BH CV ECHO MEAS - TR MAX VEL: 221.3 CM/SEC
BH CV ECHO MEAS - TR MAX VEL: 297.4 CM/SEC
BH CV ECHO MEASUREMENTS AVERAGE E/E' RATIO: 15.98
BH CV ECHO SHUNT ASSESSMENT PERFORMED (HIDDEN SCRIPTING): 1
BH CV XLRA - RV BASE: 3 CM
BH CV XLRA - RV LENGTH: 9 CM
BH CV XLRA - RV MID: 2.8 CM
BH CV XLRA - TDI S': 10.3 CM/SEC
LEFT ATRIUM VOLUME INDEX: 23.2 ML/M2
LV EF BIPLANE MOD: 29.7 %
SINUS: 3.1 CM
STJ: 2.6 CM
TV MEAN GRADIENT: 4 MMHG
TV PEAK GRADIENT: 8 MMHG

## 2025-08-15 PROCEDURE — 93325 DOPPLER ECHO COLOR FLOW MAPG: CPT

## 2025-08-15 PROCEDURE — 93320 DOPPLER ECHO COMPLETE: CPT

## 2025-08-15 PROCEDURE — 25010000002 LIDOCAINE 2% SOLUTION: Performed by: NURSE ANESTHETIST, CERTIFIED REGISTERED

## 2025-08-15 PROCEDURE — 25010000002 PROPOFOL 10 MG/ML EMULSION: Performed by: NURSE ANESTHETIST, CERTIFIED REGISTERED

## 2025-08-15 PROCEDURE — 93312 ECHO TRANSESOPHAGEAL: CPT

## 2025-08-15 RX ORDER — SODIUM CHLORIDE 9 MG/ML
INJECTION, SOLUTION INTRAVENOUS CONTINUOUS PRN
Status: DISCONTINUED | OUTPATIENT
Start: 2025-08-15 | End: 2025-08-15 | Stop reason: SURG

## 2025-08-15 RX ORDER — LIDOCAINE HYDROCHLORIDE 20 MG/ML
INJECTION, SOLUTION INFILTRATION; PERINEURAL AS NEEDED
Status: DISCONTINUED | OUTPATIENT
Start: 2025-08-15 | End: 2025-08-15 | Stop reason: SURG

## 2025-08-15 RX ORDER — PROPOFOL 10 MG/ML
VIAL (ML) INTRAVENOUS AS NEEDED
Status: DISCONTINUED | OUTPATIENT
Start: 2025-08-15 | End: 2025-08-15 | Stop reason: SURG

## 2025-08-15 RX ORDER — PROPOFOL 10 MG/ML
VIAL (ML) INTRAVENOUS CONTINUOUS PRN
Status: DISCONTINUED | OUTPATIENT
Start: 2025-08-15 | End: 2025-08-15

## 2025-08-15 RX ORDER — EPHEDRINE SULFATE 50 MG/ML
INJECTION, SOLUTION INTRAVENOUS AS NEEDED
Status: DISCONTINUED | OUTPATIENT
Start: 2025-08-15 | End: 2025-08-15 | Stop reason: SURG

## 2025-08-15 RX ADMIN — PROPOFOL 20 MG: 10 INJECTION, EMULSION INTRAVENOUS at 07:37

## 2025-08-15 RX ADMIN — PROPOFOL 50 MG: 10 INJECTION, EMULSION INTRAVENOUS at 07:21

## 2025-08-15 RX ADMIN — PROPOFOL 20 MG: 10 INJECTION, EMULSION INTRAVENOUS at 07:41

## 2025-08-15 RX ADMIN — PROPOFOL 20 MG: 10 INJECTION, EMULSION INTRAVENOUS at 07:44

## 2025-08-15 RX ADMIN — EPHEDRINE SULFATE 10 MG: 50 INJECTION INTRAVENOUS at 07:21

## 2025-08-15 RX ADMIN — PROPOFOL 20 MG: 10 INJECTION, EMULSION INTRAVENOUS at 07:29

## 2025-08-15 RX ADMIN — PROPOFOL 20 MG: 10 INJECTION, EMULSION INTRAVENOUS at 07:35

## 2025-08-15 RX ADMIN — LIDOCAINE HYDROCHLORIDE 50 MG: 20 INJECTION, SOLUTION INFILTRATION; PERINEURAL at 07:21

## 2025-08-15 RX ADMIN — PROPOFOL 20 MG: 10 INJECTION, EMULSION INTRAVENOUS at 07:24

## 2025-08-15 RX ADMIN — PROPOFOL 20 MG: 10 INJECTION, EMULSION INTRAVENOUS at 07:39

## 2025-08-15 RX ADMIN — PROPOFOL 20 MG: 10 INJECTION, EMULSION INTRAVENOUS at 07:31

## 2025-08-15 RX ADMIN — PROPOFOL 20 MG: 10 INJECTION, EMULSION INTRAVENOUS at 07:33

## 2025-08-15 RX ADMIN — PROPOFOL 20 MG: 10 INJECTION, EMULSION INTRAVENOUS at 07:26

## 2025-08-15 RX ADMIN — SODIUM CHLORIDE: 9 INJECTION, SOLUTION INTRAVENOUS at 07:13

## 2025-08-19 ENCOUNTER — DOCUMENTATION (OUTPATIENT)
Age: 60
End: 2025-08-19
Payer: OTHER GOVERNMENT

## 2025-08-20 ENCOUNTER — TELEPHONE (OUTPATIENT)
Dept: FAMILY MEDICINE CLINIC | Facility: CLINIC | Age: 60
End: 2025-08-20
Payer: OTHER GOVERNMENT

## 2025-08-20 DIAGNOSIS — I36.1 NONRHEUMATIC TRICUSPID VALVE REGURGITATION: Primary | ICD-10-CM

## 2025-08-21 ENCOUNTER — TELEPHONE (OUTPATIENT)
Dept: CARDIOLOGY | Age: 60
End: 2025-08-21
Payer: OTHER GOVERNMENT

## 2025-08-22 LAB
MC_CV_MDC_IDC_RATE_1: 150
MC_CV_MDC_IDC_RATE_1: 171
MC_CV_MDC_IDC_THERAPIES: NORMAL
MC_CV_MDC_IDC_ZONE_ID: 2
MC_CV_MDC_IDC_ZONE_ID: 6
MDC_IDC_MSMT_BATTERY_REMAINING_LONGEVITY: 105 MO
MDC_IDC_MSMT_BATTERY_RRT_TRIGGER: 2.6
MDC_IDC_MSMT_BATTERY_STATUS: NORMAL
MDC_IDC_MSMT_BATTERY_VOLTAGE: 3.04
MDC_IDC_MSMT_LEADCHNL_LV_DTM: NORMAL
MDC_IDC_MSMT_LEADCHNL_LV_IMPEDANCE_VALUE: 361
MDC_IDC_MSMT_LEADCHNL_LV_PACING_THRESHOLD_AMPLITUDE: 5.5
MDC_IDC_MSMT_LEADCHNL_LV_PACING_THRESHOLD_POLARITY: NORMAL
MDC_IDC_MSMT_LEADCHNL_LV_PACING_THRESHOLD_PULSEWIDTH: 0.4
MDC_IDC_MSMT_LEADCHNL_RA_DTM: NORMAL
MDC_IDC_MSMT_LEADCHNL_RA_IMPEDANCE_VALUE: 475
MDC_IDC_MSMT_LEADCHNL_RA_PACING_THRESHOLD_AMPLITUDE: 2.38
MDC_IDC_MSMT_LEADCHNL_RA_PACING_THRESHOLD_POLARITY: NORMAL
MDC_IDC_MSMT_LEADCHNL_RA_PACING_THRESHOLD_PULSEWIDTH: 0.4
MDC_IDC_MSMT_LEADCHNL_RA_SENSING_INTR_AMPL: 1.75
MDC_IDC_MSMT_LEADCHNL_RV_DTM: NORMAL
MDC_IDC_MSMT_LEADCHNL_RV_IMPEDANCE_VALUE: 589
MDC_IDC_MSMT_LEADCHNL_RV_PACING_THRESHOLD_AMPLITUDE: 0.62
MDC_IDC_MSMT_LEADCHNL_RV_PACING_THRESHOLD_POLARITY: NORMAL
MDC_IDC_MSMT_LEADCHNL_RV_PACING_THRESHOLD_PULSEWIDTH: 0.4
MDC_IDC_MSMT_LEADCHNL_RV_SENSING_INTR_AMPL: 9.62
MDC_IDC_PG_IMPLANT_DTM: NORMAL
MDC_IDC_PG_MFG: NORMAL
MDC_IDC_PG_MODEL: NORMAL
MDC_IDC_PG_SERIAL: NORMAL
MDC_IDC_PG_TYPE: NORMAL
MDC_IDC_SESS_DTM: NORMAL
MDC_IDC_SESS_TYPE: NORMAL
MDC_IDC_SET_BRADY_AT_MODE_SWITCH_RATE: 171
MDC_IDC_SET_BRADY_LOWRATE: 60
MDC_IDC_SET_BRADY_MAX_SENSOR_RATE: 115
MDC_IDC_SET_BRADY_MAX_TRACKING_RATE: 140
MDC_IDC_SET_BRADY_MODE: NORMAL
MDC_IDC_SET_BRADY_PAV_DELAY: 170
MDC_IDC_SET_BRADY_SAV_DELAY: 90
MDC_IDC_SET_CRT_LVRV_DELAY: 0
MDC_IDC_SET_CRT_PACED_CHAMBERS: NORMAL
MDC_IDC_SET_LEADCHNL_LV_PACING_AMPLITUDE: 2.5
MDC_IDC_SET_LEADCHNL_LV_PACING_POLARITY: NORMAL
MDC_IDC_SET_LEADCHNL_LV_PACING_PULSEWIDTH: 0.4
MDC_IDC_SET_LEADCHNL_RA_PACING_AMPLITUDE: 3.25
MDC_IDC_SET_LEADCHNL_RA_PACING_POLARITY: NORMAL
MDC_IDC_SET_LEADCHNL_RA_PACING_PULSEWIDTH: 0.4
MDC_IDC_SET_LEADCHNL_RA_SENSING_POLARITY: NORMAL
MDC_IDC_SET_LEADCHNL_RA_SENSING_SENSITIVITY: 0.3
MDC_IDC_SET_LEADCHNL_RV_PACING_AMPLITUDE: 2
MDC_IDC_SET_LEADCHNL_RV_PACING_POLARITY: NORMAL
MDC_IDC_SET_LEADCHNL_RV_PACING_PULSEWIDTH: 0.4
MDC_IDC_SET_LEADCHNL_RV_SENSING_POLARITY: NORMAL
MDC_IDC_SET_LEADCHNL_RV_SENSING_SENSITIVITY: 2.8
MDC_IDC_SET_ZONE_STATUS: NORMAL
MDC_IDC_SET_ZONE_STATUS: NORMAL
MDC_IDC_SET_ZONE_TYPE: NORMAL
MDC_IDC_SET_ZONE_TYPE: NORMAL
MDC_IDC_STAT_AT_BURDEN_PERCENT: 0
MDC_IDC_STAT_BRADY_RA_PERCENT_PACED: 8.66

## 2025-08-26 ENCOUNTER — TELEPHONE (OUTPATIENT)
Dept: CARDIOLOGY | Age: 60
End: 2025-08-26
Payer: OTHER GOVERNMENT

## 2025-08-27 LAB
MC_CV_MDC_IDC_RATE_1: 150
MC_CV_MDC_IDC_RATE_1: 171
MC_CV_MDC_IDC_THERAPIES: NORMAL
MC_CV_MDC_IDC_ZONE_ID: 2
MC_CV_MDC_IDC_ZONE_ID: 6
MDC_IDC_MSMT_BATTERY_REMAINING_LONGEVITY: 104 MO
MDC_IDC_MSMT_BATTERY_RRT_TRIGGER: 2.6
MDC_IDC_MSMT_BATTERY_STATUS: NORMAL
MDC_IDC_MSMT_BATTERY_VOLTAGE: 3.04
MDC_IDC_MSMT_LEADCHNL_LV_DTM: NORMAL
MDC_IDC_MSMT_LEADCHNL_LV_IMPEDANCE_VALUE: 361
MDC_IDC_MSMT_LEADCHNL_LV_PACING_THRESHOLD_AMPLITUDE: 6
MDC_IDC_MSMT_LEADCHNL_LV_PACING_THRESHOLD_POLARITY: NORMAL
MDC_IDC_MSMT_LEADCHNL_LV_PACING_THRESHOLD_PULSEWIDTH: 0.4
MDC_IDC_MSMT_LEADCHNL_RA_DTM: NORMAL
MDC_IDC_MSMT_LEADCHNL_RA_IMPEDANCE_VALUE: 494
MDC_IDC_MSMT_LEADCHNL_RA_PACING_THRESHOLD_AMPLITUDE: 2.38
MDC_IDC_MSMT_LEADCHNL_RA_PACING_THRESHOLD_POLARITY: NORMAL
MDC_IDC_MSMT_LEADCHNL_RA_PACING_THRESHOLD_PULSEWIDTH: 0.4
MDC_IDC_MSMT_LEADCHNL_RA_SENSING_INTR_AMPL: 1.62
MDC_IDC_MSMT_LEADCHNL_RV_DTM: NORMAL
MDC_IDC_MSMT_LEADCHNL_RV_IMPEDANCE_VALUE: 589
MDC_IDC_MSMT_LEADCHNL_RV_PACING_THRESHOLD_AMPLITUDE: 0.5
MDC_IDC_MSMT_LEADCHNL_RV_PACING_THRESHOLD_POLARITY: NORMAL
MDC_IDC_MSMT_LEADCHNL_RV_PACING_THRESHOLD_PULSEWIDTH: 0.4
MDC_IDC_MSMT_LEADCHNL_RV_SENSING_INTR_AMPL: 9.62
MDC_IDC_PG_IMPLANT_DTM: NORMAL
MDC_IDC_PG_MFG: NORMAL
MDC_IDC_PG_MODEL: NORMAL
MDC_IDC_PG_SERIAL: NORMAL
MDC_IDC_PG_TYPE: NORMAL
MDC_IDC_SESS_DTM: NORMAL
MDC_IDC_SESS_TYPE: NORMAL
MDC_IDC_SET_BRADY_AT_MODE_SWITCH_RATE: 171
MDC_IDC_SET_BRADY_LOWRATE: 60
MDC_IDC_SET_BRADY_MAX_SENSOR_RATE: 115
MDC_IDC_SET_BRADY_MAX_TRACKING_RATE: 140
MDC_IDC_SET_BRADY_MODE: NORMAL
MDC_IDC_SET_BRADY_PAV_DELAY: 170
MDC_IDC_SET_BRADY_SAV_DELAY: 90
MDC_IDC_SET_CRT_LVRV_DELAY: 0
MDC_IDC_SET_CRT_PACED_CHAMBERS: NORMAL
MDC_IDC_SET_LEADCHNL_LV_PACING_AMPLITUDE: 2.5
MDC_IDC_SET_LEADCHNL_LV_PACING_POLARITY: NORMAL
MDC_IDC_SET_LEADCHNL_LV_PACING_PULSEWIDTH: 0.4
MDC_IDC_SET_LEADCHNL_RA_PACING_AMPLITUDE: 3.25
MDC_IDC_SET_LEADCHNL_RA_PACING_POLARITY: NORMAL
MDC_IDC_SET_LEADCHNL_RA_PACING_PULSEWIDTH: 0.4
MDC_IDC_SET_LEADCHNL_RA_SENSING_POLARITY: NORMAL
MDC_IDC_SET_LEADCHNL_RA_SENSING_SENSITIVITY: 0.3
MDC_IDC_SET_LEADCHNL_RV_PACING_AMPLITUDE: 2
MDC_IDC_SET_LEADCHNL_RV_PACING_POLARITY: NORMAL
MDC_IDC_SET_LEADCHNL_RV_PACING_PULSEWIDTH: 0.4
MDC_IDC_SET_LEADCHNL_RV_SENSING_POLARITY: NORMAL
MDC_IDC_SET_LEADCHNL_RV_SENSING_SENSITIVITY: 2.8
MDC_IDC_SET_ZONE_STATUS: NORMAL
MDC_IDC_SET_ZONE_STATUS: NORMAL
MDC_IDC_SET_ZONE_TYPE: NORMAL
MDC_IDC_SET_ZONE_TYPE: NORMAL
MDC_IDC_STAT_AT_BURDEN_PERCENT: 0
MDC_IDC_STAT_BRADY_RA_PERCENT_PACED: 10.16

## (undated) DEVICE — THE PHOTONBLADE WITH ADAPTIVE SMOKE EVACUATION IS A MONOPOLAR RF DEVICE COUPLED WITH ILLUMINATION THAT IS INDICATED FOR CUTTING AND COAGULATION OF TISSUE DURING GENERAL SURGICAL PROCEDURES AND FOR REMOVING SMOKE GENERATED BY ELECTROSURGERY WHEN USED IN CONJUNCTION WITH AN EFFECTIVE SMOKE EVACUATION SYSTEM.: Brand: PHOTONBLADE WITH ADAPTIVE SMOKE EVACUATION

## (undated) DEVICE — SYR LL TP 10ML STRL

## (undated) DEVICE — LOU PACE DEFIB: Brand: MEDLINE INDUSTRIES, INC.

## (undated) DEVICE — Device: Brand: LLD EZ LEAD LOCKING DEVICE

## (undated) DEVICE — PINNACLE INTRODUCER SHEATH: Brand: PINNACLE

## (undated) DEVICE — SLITTER CATH GUIDE ATTAIN ADJ

## (undated) DEVICE — SUT VIC 3/0 SH 27IN J416H

## (undated) DEVICE — LLD ACCESSORY KIT: Brand: LLD ACCESSORY KIT

## (undated) DEVICE — PROXIMATE RH ROTATING HEAD SKIN STAPLERS (35 WIDE) CONTAINS 35 STAINLESS STEEL STAPLES: Brand: PROXIMATE

## (undated) DEVICE — DRAPE,REIN 53X77,STERILE: Brand: MEDLINE

## (undated) DEVICE — WRENCH KT PM MEDTRONIC

## (undated) DEVICE — ST SHEATH INTRO FEM CRVD 16FR

## (undated) DEVICE — Device

## (undated) DEVICE — DRP INCISE CARDIO W/ADHS 115X85X151IN LG STRL

## (undated) DEVICE — PK PROC MAJ 40

## (undated) DEVICE — SOL ISO/ALC 70PCT 4OZ

## (undated) DEVICE — IMPLANTABLE DEVICE
Type: IMPLANTABLE DEVICE | Site: HEART | Status: NON-FUNCTIONAL
Removed: 2025-04-07

## (undated) DEVICE — CATH DEFLECT SELECTSITE 9F 43CM W/ART HNDL

## (undated) DEVICE — SYS PREP BRIDGESHEATH OCCL HEMOS

## (undated) DEVICE — SENSR CERBRL O2 PK/2

## (undated) DEVICE — UNDYED BRAIDED (POLYGLACTIN 910), SYNTHETIC ABSORBABLE SUTURE: Brand: COATED VICRYL

## (undated) DEVICE — INTRO SHEATH ART/FEM ENGAGE .035 6F12CM

## (undated) DEVICE — Device: Brand: VISISHEATH DILATOR SHEATH

## (undated) DEVICE — 3M™ STERI-STRIP™ COMPOUND BENZOIN TINCTURE 40 BAGS/CARTON 4 CARTONS/CASE C1544: Brand: 3M™ STERI-STRIP™

## (undated) DEVICE — GW AMPLATZ  XSTIFF CVD .032 3MM 180CM

## (undated) DEVICE — Device: Brand: GLIDELIGHT LASER SHEATH

## (undated) DEVICE — TOWEL,OR,DSP,ST,BLUE,STD,4/PK,20PK/CS: Brand: MEDLINE

## (undated) DEVICE — INTENDED FOR TISSUE SEPARATION, AND OTHER PROCEDURES THAT REQUIRE A SHARP SURGICAL BLADE TO PUNCTURE OR CUT.: Brand: BARD-PARKER ® CARBON RIB-BACK BLADES

## (undated) DEVICE — PK PROC MINOR TOWER 40

## (undated) DEVICE — DRSNG SURESITE WNDW 4X4.5

## (undated) DEVICE — PLASMABLADE PS200-040 4.0: Brand: PLASMABLADE™

## (undated) DEVICE — DISPOSABLE ADAPTER

## (undated) DEVICE — 3M™ IOBAN™ 2 ANTIMICROBIAL INCISE DRAPE 6650EZ: Brand: IOBAN™ 2

## (undated) DEVICE — EQUIPMENT COVER BAG TYPE 48” X 36” (122CM X 91CM): Brand: EQUIPMENT COVER BAG TYPE

## (undated) DEVICE — BLD SAW STERN L 32.0MM H 6.0MM

## (undated) DEVICE — APPL CHLORAPREP W/TINT 26ML ORNG

## (undated) DEVICE — TOOL PINCHON W/LD/SEPR 6056M

## (undated) DEVICE — SUT SILK 2 SUTUPAK TIE 60IN SA8H 2STRAND

## (undated) DEVICE — SUT SILK 0 CT1 CR8 18IN C021D

## (undated) DEVICE — APPL CHLORAPREP HI/LITE 26ML ORNG

## (undated) DEVICE — SUT VIC 2/0 CT1 36IN

## (undated) DEVICE — BRIDGE OCCLUSION CATHETER - 80 MM LENGTH BALLOON: Brand: BRIDGE™ OCCLUSION BALLOON

## (undated) DEVICE — SPNG GZ WOVN 4X4IN 12PLY 10/BX STRL

## (undated) DEVICE — GLV SURG BIOGEL LTX PF 7

## (undated) DEVICE — SKIN PREP TRAY W/CHG: Brand: MEDLINE INDUSTRIES, INC.